# Patient Record
Sex: FEMALE | Race: WHITE | Employment: OTHER | URBAN - METROPOLITAN AREA
[De-identification: names, ages, dates, MRNs, and addresses within clinical notes are randomized per-mention and may not be internally consistent; named-entity substitution may affect disease eponyms.]

---

## 2017-05-01 ENCOUNTER — HOSPITAL ENCOUNTER (OUTPATIENT)
Dept: MAMMOGRAPHY | Age: 82
Discharge: HOME | End: 2017-05-01
Payer: MEDICARE

## 2017-05-01 DIAGNOSIS — Z13.9 SCREENING: ICD-10-CM

## 2017-05-01 PROCEDURE — 77063 BREAST TOMOSYNTHESIS BI: CPT

## 2017-06-23 ENCOUNTER — HOSPITAL ENCOUNTER (EMERGENCY)
Age: 82
Discharge: HOME | End: 2017-06-23
Payer: MEDICARE

## 2017-06-23 ENCOUNTER — APPOINTMENT (OUTPATIENT)
Dept: CT IMAGING | Age: 82
End: 2017-06-23
Payer: MEDICARE

## 2017-06-23 VITALS
HEIGHT: 70 IN | HEART RATE: 63 BPM | RESPIRATION RATE: 17 BRPM | WEIGHT: 191 LBS | OXYGEN SATURATION: 98 % | SYSTOLIC BLOOD PRESSURE: 198 MMHG | DIASTOLIC BLOOD PRESSURE: 72 MMHG | TEMPERATURE: 98.1 F | BODY MASS INDEX: 27.35 KG/M2

## 2017-06-23 DIAGNOSIS — I10 ESSENTIAL HYPERTENSION: Primary | ICD-10-CM

## 2017-06-23 LAB
ABSOLUTE EOS #: 0.1 K/UL (ref 0–0.4)
ABSOLUTE LYMPH #: 1.4 K/UL (ref 1–4.8)
ABSOLUTE MONO #: 0.5 K/UL (ref 0.2–0.8)
ANION GAP SERPL CALCULATED.3IONS-SCNC: 12 MMOL/L (ref 9–17)
BASOPHILS # BLD: 0 %
BASOPHILS ABSOLUTE: 0 K/UL (ref 0–0.2)
BUN BLDV-MCNC: 21 MG/DL (ref 8–23)
BUN/CREAT BLD: 25 (ref 9–20)
CALCIUM SERPL-MCNC: 9 MG/DL (ref 8.6–10.4)
CHLORIDE BLD-SCNC: 103 MMOL/L (ref 98–107)
CO2: 29 MMOL/L (ref 20–31)
CREAT SERPL-MCNC: 0.85 MG/DL (ref 0.5–0.9)
DIFFERENTIAL TYPE: ABNORMAL
EOSINOPHILS RELATIVE PERCENT: 3 %
GFR AFRICAN AMERICAN: >60 ML/MIN
GFR NON-AFRICAN AMERICAN: >60 ML/MIN
GFR SERPL CREATININE-BSD FRML MDRD: ABNORMAL ML/MIN/{1.73_M2}
GFR SERPL CREATININE-BSD FRML MDRD: ABNORMAL ML/MIN/{1.73_M2}
GLUCOSE BLD-MCNC: 131 MG/DL (ref 70–99)
HCT VFR BLD CALC: 36.7 % (ref 36–46)
HEMOGLOBIN: 12.2 G/DL (ref 12–16)
LYMPHOCYTES # BLD: 30 %
MCH RBC QN AUTO: 29.7 PG (ref 26–34)
MCHC RBC AUTO-ENTMCNC: 33.2 G/DL (ref 31–37)
MCV RBC AUTO: 89.4 FL (ref 80–100)
MONOCYTES # BLD: 11 %
MYOGLOBIN: 44 NG/ML (ref 25–58)
PDW BLD-RTO: 15.9 % (ref 11.5–14.5)
PLATELET # BLD: 194 K/UL (ref 130–400)
PLATELET ESTIMATE: ABNORMAL
PMV BLD AUTO: 7.7 FL (ref 6–12)
POTASSIUM SERPL-SCNC: 4.4 MMOL/L (ref 3.7–5.3)
RBC # BLD: 4.1 M/UL (ref 4–5.2)
RBC # BLD: ABNORMAL 10*6/UL
SEG NEUTROPHILS: 56 %
SEGMENTED NEUTROPHILS ABSOLUTE COUNT: 2.6 K/UL (ref 1.8–7.7)
SODIUM BLD-SCNC: 144 MMOL/L (ref 135–144)
TROPONIN INTERP: NORMAL
TROPONIN T: <0.03 NG/ML
WBC # BLD: 4.7 K/UL (ref 3.5–11)
WBC # BLD: ABNORMAL 10*3/UL

## 2017-06-23 PROCEDURE — 70450 CT HEAD/BRAIN W/O DYE: CPT

## 2017-06-23 PROCEDURE — 93005 ELECTROCARDIOGRAM TRACING: CPT

## 2017-06-23 PROCEDURE — 85025 COMPLETE CBC W/AUTO DIFF WBC: CPT

## 2017-06-23 PROCEDURE — 84484 ASSAY OF TROPONIN QUANT: CPT

## 2017-06-23 PROCEDURE — 99284 EMERGENCY DEPT VISIT MOD MDM: CPT

## 2017-06-23 PROCEDURE — 6370000000 HC RX 637 (ALT 250 FOR IP): Performed by: NURSE PRACTITIONER

## 2017-06-23 PROCEDURE — 83874 ASSAY OF MYOGLOBIN: CPT

## 2017-06-23 PROCEDURE — 80048 BASIC METABOLIC PNL TOTAL CA: CPT

## 2017-06-23 PROCEDURE — 6360000002 HC RX W HCPCS: Performed by: NURSE PRACTITIONER

## 2017-06-23 PROCEDURE — 96374 THER/PROPH/DIAG INJ IV PUSH: CPT

## 2017-06-23 RX ORDER — CLONIDINE HYDROCHLORIDE 0.1 MG/1
0.2 TABLET ORAL ONCE
Status: COMPLETED | OUTPATIENT
Start: 2017-06-23 | End: 2017-06-23

## 2017-06-23 RX ORDER — CYANOCOBALAMIN 1000 UG/ML
1000 INJECTION INTRAMUSCULAR; SUBCUTANEOUS
COMMUNITY

## 2017-06-23 RX ORDER — LISINOPRIL 10 MG/1
10 TABLET ORAL DAILY
Status: ON HOLD | COMMUNITY
End: 2020-09-10 | Stop reason: HOSPADM

## 2017-06-23 RX ORDER — HYDRALAZINE HYDROCHLORIDE 20 MG/ML
10 INJECTION INTRAMUSCULAR; INTRAVENOUS ONCE
Status: COMPLETED | OUTPATIENT
Start: 2017-06-23 | End: 2017-06-23

## 2017-06-23 RX ADMIN — HYDRALAZINE HYDROCHLORIDE 10 MG: 20 INJECTION INTRAMUSCULAR; INTRAVENOUS at 10:25

## 2017-06-23 RX ADMIN — CLONIDINE HYDROCHLORIDE 0.2 MG: 0.1 TABLET ORAL at 11:27

## 2017-06-23 ASSESSMENT — ENCOUNTER SYMPTOMS
RHINORRHEA: 0
SHORTNESS OF BREATH: 0
SINUS PRESSURE: 0
WHEEZING: 0
ABDOMINAL PAIN: 0
SORE THROAT: 0
VOMITING: 0
COLOR CHANGE: 0
DIARRHEA: 0
CONSTIPATION: 0
COUGH: 0
NAUSEA: 0

## 2017-06-27 LAB
EKG ATRIAL RATE: 56 BPM
EKG P AXIS: 20 DEGREES
EKG P-R INTERVAL: 134 MS
EKG Q-T INTERVAL: 448 MS
EKG QRS DURATION: 110 MS
EKG QTC CALCULATION (BAZETT): 432 MS
EKG R AXIS: -12 DEGREES
EKG T AXIS: 76 DEGREES
EKG VENTRICULAR RATE: 56 BPM

## 2018-04-12 ENCOUNTER — HOSPITAL ENCOUNTER (EMERGENCY)
Age: 83
Discharge: HOME | End: 2018-04-12
Attending: EMERGENCY MEDICINE
Payer: MEDICARE

## 2018-04-12 VITALS
RESPIRATION RATE: 22 BRPM | HEIGHT: 70 IN | TEMPERATURE: 98.2 F | OXYGEN SATURATION: 96 % | SYSTOLIC BLOOD PRESSURE: 162 MMHG | DIASTOLIC BLOOD PRESSURE: 97 MMHG | HEART RATE: 59 BPM | WEIGHT: 190 LBS | BODY MASS INDEX: 27.2 KG/M2

## 2018-04-12 DIAGNOSIS — N39.0 URINARY TRACT INFECTION WITHOUT HEMATURIA, SITE UNSPECIFIED: Primary | ICD-10-CM

## 2018-04-12 LAB
-: ABNORMAL
ABSOLUTE EOS #: 0.1 K/UL (ref 0–0.4)
ABSOLUTE IMMATURE GRANULOCYTE: ABNORMAL K/UL (ref 0–0.3)
ABSOLUTE LYMPH #: 1.3 K/UL (ref 1–4.8)
ABSOLUTE MONO #: 0.6 K/UL (ref 0.2–0.8)
AMORPHOUS: ABNORMAL
ANION GAP SERPL CALCULATED.3IONS-SCNC: 11 MMOL/L (ref 9–17)
APPEARANCE: NORMAL
BACTERIA: ABNORMAL
BASOPHILS # BLD: 0 % (ref 0–2)
BASOPHILS ABSOLUTE: 0 K/UL (ref 0–0.2)
BILIRUBIN URINE: NEGATIVE
BILIRUBIN, POC: NORMAL
BLOOD URINE, POC: NORMAL
BUN BLDV-MCNC: 16 MG/DL (ref 8–23)
BUN/CREAT BLD: 21 (ref 9–20)
CALCIUM SERPL-MCNC: 8.2 MG/DL (ref 8.6–10.4)
CASTS UA: ABNORMAL /LPF
CHLORIDE BLD-SCNC: 107 MMOL/L (ref 98–107)
CO2: 26 MMOL/L (ref 20–31)
COLOR, POC: YELLOW
COLOR: YELLOW
COMMENT UA: ABNORMAL
CREAT SERPL-MCNC: 0.75 MG/DL (ref 0.5–0.9)
CRYSTALS, UA: ABNORMAL /HPF
DIFFERENTIAL TYPE: ABNORMAL
EKG ATRIAL RATE: 57 BPM
EKG P AXIS: 64 DEGREES
EKG P-R INTERVAL: 172 MS
EKG Q-T INTERVAL: 480 MS
EKG QRS DURATION: 106 MS
EKG QTC CALCULATION (BAZETT): 467 MS
EKG R AXIS: -10 DEGREES
EKG T AXIS: 63 DEGREES
EKG VENTRICULAR RATE: 57 BPM
EOSINOPHILS RELATIVE PERCENT: 3 % (ref 1–4)
EPITHELIAL CELLS UA: ABNORMAL /HPF (ref 0–5)
GFR AFRICAN AMERICAN: >60 ML/MIN
GFR NON-AFRICAN AMERICAN: >60 ML/MIN
GFR SERPL CREATININE-BSD FRML MDRD: ABNORMAL ML/MIN/{1.73_M2}
GFR SERPL CREATININE-BSD FRML MDRD: ABNORMAL ML/MIN/{1.73_M2}
GLUCOSE BLD-MCNC: 103 MG/DL (ref 70–99)
GLUCOSE URINE, POC: NORMAL
GLUCOSE URINE: NEGATIVE
HCT VFR BLD CALC: 36.9 % (ref 36–46)
HEMOGLOBIN: 12.4 G/DL (ref 12–16)
IMMATURE GRANULOCYTES: ABNORMAL %
KETONES, POC: NORMAL
KETONES, URINE: NEGATIVE
LEUKOCYTE EST, POC: NORMAL
LEUKOCYTE ESTERASE, URINE: ABNORMAL
LYMPHOCYTES # BLD: 26 % (ref 24–44)
MCH RBC QN AUTO: 31.4 PG (ref 26–34)
MCHC RBC AUTO-ENTMCNC: 33.5 G/DL (ref 31–37)
MCV RBC AUTO: 93.6 FL (ref 80–100)
MONOCYTES # BLD: 12 % (ref 1–7)
MUCUS: ABNORMAL
MYOGLOBIN: 46 NG/ML (ref 25–58)
NITRITE, POC: POSITIVE
NITRITE, URINE: POSITIVE
NRBC AUTOMATED: ABNORMAL PER 100 WBC
OTHER OBSERVATIONS UA: ABNORMAL
PDW BLD-RTO: 15 % (ref 11.5–14.5)
PH UA: 6.5 (ref 5–8)
PH, POC: 6.5
PLATELET # BLD: 216 K/UL (ref 130–400)
PLATELET ESTIMATE: ABNORMAL
PMV BLD AUTO: ABNORMAL FL (ref 6–12)
POTASSIUM SERPL-SCNC: 4 MMOL/L (ref 3.7–5.3)
PROTEIN UA: NEGATIVE
PROTEIN, POC: NORMAL
RBC # BLD: 3.95 M/UL (ref 4–5.2)
RBC # BLD: ABNORMAL 10*6/UL
RBC UA: ABNORMAL /HPF (ref 0–2)
RENAL EPITHELIAL, UA: ABNORMAL /HPF
SEG NEUTROPHILS: 59 % (ref 36–66)
SEGMENTED NEUTROPHILS ABSOLUTE COUNT: 3 K/UL (ref 1.8–7.7)
SODIUM BLD-SCNC: 144 MMOL/L (ref 135–144)
SPECIFIC GRAVITY UA: 1.02 (ref 1–1.03)
SPECIFIC GRAVITY, POC: 1.02
TRICHOMONAS: ABNORMAL
TROPONIN INTERP: NORMAL
TROPONIN T: <0.03 NG/ML
TSH SERPL DL<=0.05 MIU/L-ACNC: 3.42 MIU/L (ref 0.3–5)
TURBIDITY: ABNORMAL
URINE HGB: ABNORMAL
UROBILINOGEN, POC: 0.2
UROBILINOGEN, URINE: NORMAL
WBC # BLD: 5 K/UL (ref 3.5–11)
WBC # BLD: ABNORMAL 10*3/UL
WBC UA: ABNORMAL /HPF (ref 0–5)
YEAST: ABNORMAL

## 2018-04-12 PROCEDURE — 87088 URINE BACTERIA CULTURE: CPT

## 2018-04-12 PROCEDURE — 84443 ASSAY THYROID STIM HORMONE: CPT

## 2018-04-12 PROCEDURE — 80048 BASIC METABOLIC PNL TOTAL CA: CPT

## 2018-04-12 PROCEDURE — 87086 URINE CULTURE/COLONY COUNT: CPT

## 2018-04-12 PROCEDURE — 93005 ELECTROCARDIOGRAM TRACING: CPT

## 2018-04-12 PROCEDURE — 84484 ASSAY OF TROPONIN QUANT: CPT

## 2018-04-12 PROCEDURE — 85025 COMPLETE CBC W/AUTO DIFF WBC: CPT

## 2018-04-12 PROCEDURE — 99283 EMERGENCY DEPT VISIT LOW MDM: CPT

## 2018-04-12 PROCEDURE — 96374 THER/PROPH/DIAG INJ IV PUSH: CPT

## 2018-04-12 PROCEDURE — 2500000003 HC RX 250 WO HCPCS: Performed by: EMERGENCY MEDICINE

## 2018-04-12 PROCEDURE — 87186 SC STD MICRODIL/AGAR DIL: CPT

## 2018-04-12 PROCEDURE — 83874 ASSAY OF MYOGLOBIN: CPT

## 2018-04-12 PROCEDURE — 81001 URINALYSIS AUTO W/SCOPE: CPT

## 2018-04-12 PROCEDURE — 2580000003 HC RX 258: Performed by: EMERGENCY MEDICINE

## 2018-04-12 PROCEDURE — 6360000002 HC RX W HCPCS: Performed by: EMERGENCY MEDICINE

## 2018-04-12 RX ORDER — 0.9 % SODIUM CHLORIDE 0.9 %
500 INTRAVENOUS SOLUTION INTRAVENOUS ONCE
Status: COMPLETED | OUTPATIENT
Start: 2018-04-12 | End: 2018-04-12

## 2018-04-12 RX ORDER — CEPHALEXIN 500 MG/1
500 CAPSULE ORAL 3 TIMES DAILY
Qty: 30 CAPSULE | Refills: 0 | Status: SHIPPED | OUTPATIENT
Start: 2018-04-12 | End: 2018-06-29

## 2018-04-12 RX ADMIN — CEFTRIAXONE SODIUM 1 G: 10 INJECTION, POWDER, FOR SOLUTION INTRAVENOUS at 10:12

## 2018-04-12 RX ADMIN — SODIUM CHLORIDE 500 ML: 9 INJECTION, SOLUTION INTRAVENOUS at 10:11

## 2018-04-14 LAB
CULTURE: ABNORMAL
CULTURE: ABNORMAL
Lab: ABNORMAL
ORGANISM: ABNORMAL
SPECIMEN DESCRIPTION: ABNORMAL
SPECIMEN DESCRIPTION: ABNORMAL
STATUS: ABNORMAL

## 2018-06-29 ENCOUNTER — APPOINTMENT (OUTPATIENT)
Dept: GENERAL RADIOLOGY | Age: 83
End: 2018-06-29
Payer: MEDICARE

## 2018-06-29 ENCOUNTER — HOSPITAL ENCOUNTER (EMERGENCY)
Age: 83
Discharge: HOME | End: 2018-06-29
Attending: EMERGENCY MEDICINE
Payer: MEDICARE

## 2018-06-29 VITALS
HEIGHT: 70 IN | RESPIRATION RATE: 16 BRPM | OXYGEN SATURATION: 96 % | WEIGHT: 190 LBS | BODY MASS INDEX: 27.2 KG/M2 | DIASTOLIC BLOOD PRESSURE: 63 MMHG | HEART RATE: 51 BPM | TEMPERATURE: 97.7 F | SYSTOLIC BLOOD PRESSURE: 140 MMHG

## 2018-06-29 DIAGNOSIS — M54.41 BILATERAL LOW BACK PAIN WITH RIGHT-SIDED SCIATICA, UNSPECIFIED CHRONICITY: Primary | ICD-10-CM

## 2018-06-29 DIAGNOSIS — K59.00 CONSTIPATION, UNSPECIFIED CONSTIPATION TYPE: ICD-10-CM

## 2018-06-29 PROCEDURE — 72100 X-RAY EXAM L-S SPINE 2/3 VWS: CPT

## 2018-06-29 PROCEDURE — 6370000000 HC RX 637 (ALT 250 FOR IP): Performed by: NURSE PRACTITIONER

## 2018-06-29 PROCEDURE — 73502 X-RAY EXAM HIP UNI 2-3 VIEWS: CPT

## 2018-06-29 PROCEDURE — 99283 EMERGENCY DEPT VISIT LOW MDM: CPT

## 2018-06-29 RX ORDER — HYDROCODONE BITARTRATE AND ACETAMINOPHEN 5; 325 MG/1; MG/1
1 TABLET ORAL EVERY 6 HOURS PRN
Qty: 20 TABLET | Refills: 0 | Status: SHIPPED | OUTPATIENT
Start: 2018-06-29 | End: 2018-07-06

## 2018-06-29 RX ORDER — HYDROCODONE BITARTRATE AND ACETAMINOPHEN 5; 325 MG/1; MG/1
1 TABLET ORAL ONCE
Status: COMPLETED | OUTPATIENT
Start: 2018-06-29 | End: 2018-06-29

## 2018-06-29 RX ORDER — DONEPEZIL HYDROCHLORIDE 5 MG/1
5 TABLET, FILM COATED ORAL NIGHTLY
COMMUNITY
Start: 2018-01-30

## 2018-06-29 RX ORDER — OMEPRAZOLE 10 MG/1
20 CAPSULE, DELAYED RELEASE ORAL
COMMUNITY
Start: 2018-05-01 | End: 2018-06-30

## 2018-06-29 RX ORDER — LORAZEPAM 0.5 MG/1
0.5 TABLET ORAL EVERY 6 HOURS PRN
Status: ON HOLD | COMMUNITY
End: 2021-02-22 | Stop reason: ALTCHOICE

## 2018-06-29 RX ADMIN — HYDROCODONE BITARTRATE AND ACETAMINOPHEN 1 TABLET: 5; 325 TABLET ORAL at 09:39

## 2018-06-29 ASSESSMENT — PAIN DESCRIPTION - PAIN TYPE: TYPE: ACUTE PAIN

## 2018-06-29 ASSESSMENT — ENCOUNTER SYMPTOMS
COUGH: 0
ABDOMINAL PAIN: 0
SHORTNESS OF BREATH: 0
COLOR CHANGE: 0
BACK PAIN: 1

## 2018-06-29 ASSESSMENT — PAIN SCALES - GENERAL
PAINLEVEL_OUTOF10: 9
PAINLEVEL_OUTOF10: 7

## 2018-06-29 ASSESSMENT — PAIN DESCRIPTION - ORIENTATION: ORIENTATION: RIGHT;UPPER;LOWER

## 2018-06-29 ASSESSMENT — PAIN DESCRIPTION - LOCATION: LOCATION: LEG

## 2018-06-29 NOTE — ED PROVIDER NOTES
pain.   Gastrointestinal: Negative for abdominal pain. Genitourinary: Negative for dysuria, flank pain, frequency, hematuria and urgency. Musculoskeletal: Positive for arthralgias, back pain and myalgias. Negative for neck pain. Skin: Negative for color change, rash and wound. Neurological: Negative for dizziness, weakness, light-headedness, numbness and headaches. Except as noted above the remainder of the review of systems was reviewed and negative. PHYSICAL EXAM    (up to 7 for level 4, 8 or more for level 5)     ED Triage Vitals   BP Temp Temp src Pulse Resp SpO2 Height Weight   -- -- -- -- -- -- -- --     Physical Exam   Constitutional: She is oriented to person, place, and time. She appears well-developed and well-nourished. No distress. Eyes: Conjunctivae are normal.   Cardiovascular: Normal rate, regular rhythm and normal heart sounds. Pulmonary/Chest: Effort normal and breath sounds normal. No respiratory distress. She has no wheezes. She has no rales. Abdominal: Soft. She exhibits no distension. There is no tenderness. Musculoskeletal: She exhibits no edema. Right hip: She exhibits tenderness. She exhibits no swelling and no deformity. Thoracic back: She exhibits no tenderness, no bony tenderness and no pain. Lumbar back: She exhibits tenderness and pain. She exhibits no swelling and no deformity. Back:    Neurological: She is alert and oriented to person, place, and time. Skin: Skin is warm and dry. No rash noted. She is not diaphoretic. Psychiatric: She has a normal mood and affect. Her behavior is normal.   Vitals reviewed.       DIAGNOSTIC RESULTS     RADIOLOGY:   Non-plain film images such as CT, Ultrasound and MRI are read by the radiologist. Plain radiographic images are visualized and preliminarily interpreted by the emergency physician with the below findings:    Interpretation per the Radiologist below, if available at the time of this the right greater trochanter. There is up to 5 mm of periprosthetic lucency surrounding the acetabular component of the right hip arthroplasty hardware. Right femoral head component projects centrally over the right acetabular component. Lumbar spine: 1. Moderate diffuse degenerative changes throughout the lumbar spine. No clear evidence for acute fracture within the lumbar spine. Mild levoconvex curvature of the lumbar spine. Diffuse osteopenia. 2. Moderate stool burden. Right hip: 1. Up to 5 mm periprosthetic lucency surrounding the acetabular component of the right hip arthroplasty hardware. 2. Diffuse osteopenia. No acute osseous abnormality identified. 3. Moderate stool burden. 4. Mild left hip osteoarthrosis. 5. Top the calcification adjacent to the right greater trochanter. Xr Hip 2-3 Vw W Pelvis Right    Result Date: 6/29/2018  EXAMINATION: SINGLE XRAY VIEW OF THE PELVIS AND 2 XRAY VEWS RIGHT HIP; 3 XRAY VIEWS OF THE LUMBAR SPINE 6/29/2018 9:36 am COMPARISON: AP pelvis from 04/08/2011 ; lumbar spine plain radiographs from 11/06/2009 HISTORY: ORDERING SYSTEM PROVIDED HISTORY: pain TECHNOLOGIST PROVIDED HISTORY: Reason for exam:->pain Ordering Physician Provided Reason for Exam: Leg pain since last night. Pain with walking. Acuity: Acute Type of Exam: Initial 55-year-old female with acute leg pain since last night ; pain with ambulation FINDINGS: Lumbar spine: Lumbar spine is imaged from superior aspect of T10 to the lower coccyx on the lateral views. Gross preservation of the vertebral body heights. Mild to moderate multilevel intervertebral disc space narrowing and mild multilevel hypertrophic osteophyte spur formation. Diffuse osteopenia. Severe disc space narrowing at L4-L5-L5-S1. Moderate facet arthrosis at the lower lumbar/lumbosacral junction. Alignment well maintained. Atherosclerotic calcification of the aorta. Moderate stool burden.   Surgical clips project over the right upper quadrant as well as the left upper quadrant. Indeterminate hyperdense structure projects over the right mid abdomen. Psoas shadows symmetric in appearance. Bilateral SI joints appear patent. Visualized sacral arcuate lines appear intact. Mild levoconvex curvature of the lumbar spine. Right hip: Indeterminate hyperdense structure projects over the inferior left hemipelvis. Pelvic phleboliths. Moderate stool burden. Diffuse osteopenia. Moderate degenerative changes in the lower lumbar/lumbosacral spine. Bilateral SI joints appear patent. Visualized sacral arcuate lines appear intact. Mild left hip osteoarthrosis. Total right hip arthroplasty hardware partially visualized. Iliac wings symmetric in appearance. Mild stool in the rectal vault. Heterotopic ossification adjacent to the right greater trochanter. There is up to 5 mm of periprosthetic lucency surrounding the acetabular component of the right hip arthroplasty hardware. Right femoral head component projects centrally over the right acetabular component. Lumbar spine: 1. Moderate diffuse degenerative changes throughout the lumbar spine. No clear evidence for acute fracture within the lumbar spine. Mild levoconvex curvature of the lumbar spine. Diffuse osteopenia. 2. Moderate stool burden. Right hip: 1. Up to 5 mm periprosthetic lucency surrounding the acetabular component of the right hip arthroplasty hardware. 2. Diffuse osteopenia. No acute osseous abnormality identified. 3. Moderate stool burden. 4. Mild left hip osteoarthrosis. 5. Top the calcification adjacent to the right greater trochanter.      EMERGENCY DEPARTMENT COURSE and DIFFERENTIAL DIAGNOSIS/MDM:   Vitals:    Vitals:    06/29/18 0850   BP: (!) 140/63   Pulse: 51   Resp: 16   Temp: 97.7 °F (36.5 °C)   TempSrc: Oral   SpO2: 96%   Weight: 190 lb (86.2 kg)   Height: 5' 10\" (1.778 m)       MEDICATIONS GIVEN IN THE ED:  Medications   HYDROcodone-acetaminophen (NORCO) 5-325 MG per

## 2018-07-10 ENCOUNTER — HOSPITAL ENCOUNTER (OUTPATIENT)
Dept: MAMMOGRAPHY | Age: 83
Discharge: HOME | End: 2018-07-12
Payer: MEDICARE

## 2018-07-10 DIAGNOSIS — Z12.31 ENCOUNTER FOR SCREENING MAMMOGRAM FOR BREAST CANCER: ICD-10-CM

## 2018-07-10 PROCEDURE — 77063 BREAST TOMOSYNTHESIS BI: CPT

## 2018-07-13 ENCOUNTER — HOSPITAL ENCOUNTER (OUTPATIENT)
Dept: VASCULAR LAB | Age: 83
Discharge: HOME | End: 2018-07-13
Payer: MEDICARE

## 2018-07-13 PROCEDURE — 93924 LWR XTR VASC STDY BILAT: CPT

## 2020-09-04 ENCOUNTER — HOSPITAL ENCOUNTER (EMERGENCY)
Age: 85
Discharge: ANOTHER HOSPITAL | End: 2020-09-05
Attending: EMERGENCY MEDICINE
Payer: MEDICARE

## 2020-09-04 ENCOUNTER — APPOINTMENT (OUTPATIENT)
Dept: GENERAL RADIOLOGY | Age: 85
End: 2020-09-04
Payer: MEDICARE

## 2020-09-04 VITALS
WEIGHT: 150 LBS | DIASTOLIC BLOOD PRESSURE: 72 MMHG | TEMPERATURE: 99.4 F | RESPIRATION RATE: 18 BRPM | HEART RATE: 69 BPM | OXYGEN SATURATION: 95 % | SYSTOLIC BLOOD PRESSURE: 127 MMHG | BODY MASS INDEX: 22.22 KG/M2 | HEIGHT: 69 IN

## 2020-09-04 PROBLEM — D69.6 THROMBOCYTOPENIA (HCC): Status: ACTIVE | Noted: 2020-09-04

## 2020-09-04 PROBLEM — U07.1 COVID-19: Status: ACTIVE | Noted: 2020-09-04

## 2020-09-04 PROBLEM — N17.9 AKI (ACUTE KIDNEY INJURY) (HCC): Status: ACTIVE | Noted: 2020-09-04

## 2020-09-04 LAB
ABSOLUTE EOS #: 0.03 K/UL (ref 0–0.4)
ABSOLUTE IMMATURE GRANULOCYTE: 0 K/UL (ref 0–0.3)
ABSOLUTE LYMPH #: 0.81 K/UL (ref 1–4.8)
ABSOLUTE MONO #: 0.42 K/UL (ref 0.2–0.8)
ALBUMIN SERPL-MCNC: 4 G/DL (ref 3.5–5.2)
ALBUMIN/GLOBULIN RATIO: ABNORMAL (ref 1–2.5)
ALP BLD-CCNC: 49 U/L (ref 35–104)
ALT SERPL-CCNC: 30 U/L (ref 5–33)
ANION GAP SERPL CALCULATED.3IONS-SCNC: 9 MMOL/L (ref 9–17)
AST SERPL-CCNC: 36 U/L
BASOPHILS # BLD: 0 %
BASOPHILS ABSOLUTE: 0 K/UL (ref 0–0.2)
BILIRUB SERPL-MCNC: 0.38 MG/DL (ref 0.3–1.2)
BILIRUBIN URINE: NEGATIVE
BNP INTERPRETATION: ABNORMAL
BUN BLDV-MCNC: 31 MG/DL (ref 8–23)
BUN/CREAT BLD: 29 (ref 9–20)
C-REACTIVE PROTEIN: 3.1 MG/L (ref 0–5)
CALCIUM SERPL-MCNC: 8.9 MG/DL (ref 8.6–10.4)
CHLORIDE BLD-SCNC: 104 MMOL/L (ref 98–107)
CO2: 27 MMOL/L (ref 20–31)
COLOR: YELLOW
COMMENT UA: NORMAL
CREAT SERPL-MCNC: 1.08 MG/DL (ref 0.5–0.9)
DIFFERENTIAL TYPE: ABNORMAL
EOSINOPHILS RELATIVE PERCENT: 1 % (ref 1–4)
FERRITIN: 283 UG/L (ref 13–150)
GFR AFRICAN AMERICAN: 58 ML/MIN
GFR NON-AFRICAN AMERICAN: 48 ML/MIN
GFR SERPL CREATININE-BSD FRML MDRD: ABNORMAL ML/MIN/{1.73_M2}
GFR SERPL CREATININE-BSD FRML MDRD: ABNORMAL ML/MIN/{1.73_M2}
GLUCOSE BLD-MCNC: 135 MG/DL (ref 70–99)
GLUCOSE URINE: NEGATIVE
HCT VFR BLD CALC: 34.3 % (ref 36.3–47.1)
HEMOGLOBIN: 10.9 G/DL (ref 11.9–15.1)
IMMATURE GRANULOCYTES: 0 %
KETONES, URINE: NEGATIVE
LACTATE DEHYDROGENASE: 185 U/L (ref 135–214)
LACTIC ACID, SEPSIS WHOLE BLOOD: NORMAL MMOL/L (ref 0.5–1.9)
LACTIC ACID, SEPSIS WHOLE BLOOD: NORMAL MMOL/L (ref 0.5–1.9)
LACTIC ACID, SEPSIS: 0.8 MMOL/L (ref 0.5–1.9)
LACTIC ACID, SEPSIS: 1.2 MMOL/L (ref 0.5–1.9)
LEUKOCYTE ESTERASE, URINE: NEGATIVE
LYMPHOCYTES # BLD: 27 % (ref 24–44)
MCH RBC QN AUTO: 31 PG (ref 25.2–33.5)
MCHC RBC AUTO-ENTMCNC: 31.8 G/DL (ref 28.4–34.8)
MCV RBC AUTO: 97.4 FL (ref 82.6–102.9)
MONOCYTES # BLD: 14 % (ref 1–7)
MYOGLOBIN: 52 NG/ML (ref 25–58)
MYOGLOBIN: 62 NG/ML (ref 25–58)
NITRITE, URINE: NEGATIVE
NRBC AUTOMATED: 0 PER 100 WBC
PDW BLD-RTO: 13.4 % (ref 11.8–14.4)
PH UA: 5.5 (ref 5–8)
PLATELET # BLD: 129 K/UL (ref 138–453)
PLATELET ESTIMATE: ABNORMAL
PMV BLD AUTO: 9.9 FL (ref 8.1–13.5)
POTASSIUM SERPL-SCNC: 4.5 MMOL/L (ref 3.7–5.3)
PRO-BNP: 2196 PG/ML
PROCALCITONIN: 0.07 NG/ML
PROTEIN UA: NEGATIVE
RBC # BLD: 3.52 M/UL (ref 3.95–5.11)
RBC # BLD: ABNORMAL 10*6/UL
SARS-COV-2, PCR: ABNORMAL
SARS-COV-2, RAPID: DETECTED
SARS-COV-2: ABNORMAL
SEG NEUTROPHILS: 58 % (ref 36–66)
SEGMENTED NEUTROPHILS ABSOLUTE COUNT: 1.74 K/UL (ref 1.8–7.7)
SODIUM BLD-SCNC: 140 MMOL/L (ref 135–144)
SOURCE: ABNORMAL
SPECIFIC GRAVITY UA: 1.02 (ref 1–1.03)
TOTAL PROTEIN: 6.1 G/DL (ref 6.4–8.3)
TROPONIN INTERP: ABNORMAL
TROPONIN INTERP: ABNORMAL
TROPONIN T: ABNORMAL NG/ML
TROPONIN T: ABNORMAL NG/ML
TROPONIN, HIGH SENSITIVITY: 32 NG/L (ref 0–14)
TROPONIN, HIGH SENSITIVITY: 33 NG/L (ref 0–14)
TURBIDITY: CLEAR
URINE HGB: NEGATIVE
UROBILINOGEN, URINE: NORMAL
WBC # BLD: 3 K/UL (ref 3.5–11.3)
WBC # BLD: ABNORMAL 10*3/UL

## 2020-09-04 PROCEDURE — 84145 PROCALCITONIN (PCT): CPT

## 2020-09-04 PROCEDURE — 71045 X-RAY EXAM CHEST 1 VIEW: CPT

## 2020-09-04 PROCEDURE — 99284 EMERGENCY DEPT VISIT MOD MDM: CPT

## 2020-09-04 PROCEDURE — 86140 C-REACTIVE PROTEIN: CPT

## 2020-09-04 PROCEDURE — 80053 COMPREHEN METABOLIC PANEL: CPT

## 2020-09-04 PROCEDURE — 93005 ELECTROCARDIOGRAM TRACING: CPT | Performed by: NURSE PRACTITIONER

## 2020-09-04 PROCEDURE — 87040 BLOOD CULTURE FOR BACTERIA: CPT

## 2020-09-04 PROCEDURE — 2580000003 HC RX 258: Performed by: NURSE PRACTITIONER

## 2020-09-04 PROCEDURE — 83880 ASSAY OF NATRIURETIC PEPTIDE: CPT

## 2020-09-04 PROCEDURE — U0002 COVID-19 LAB TEST NON-CDC: HCPCS

## 2020-09-04 PROCEDURE — 81003 URINALYSIS AUTO W/O SCOPE: CPT

## 2020-09-04 PROCEDURE — 84484 ASSAY OF TROPONIN QUANT: CPT

## 2020-09-04 PROCEDURE — 6370000000 HC RX 637 (ALT 250 FOR IP): Performed by: NURSE PRACTITIONER

## 2020-09-04 PROCEDURE — 82728 ASSAY OF FERRITIN: CPT

## 2020-09-04 PROCEDURE — 83605 ASSAY OF LACTIC ACID: CPT

## 2020-09-04 PROCEDURE — 83874 ASSAY OF MYOGLOBIN: CPT

## 2020-09-04 PROCEDURE — 83615 LACTATE (LD) (LDH) ENZYME: CPT

## 2020-09-04 PROCEDURE — 87086 URINE CULTURE/COLONY COUNT: CPT

## 2020-09-04 PROCEDURE — 85025 COMPLETE CBC W/AUTO DIFF WBC: CPT

## 2020-09-04 RX ORDER — SODIUM CHLORIDE 9 MG/ML
INJECTION, SOLUTION INTRAVENOUS CONTINUOUS
Status: DISCONTINUED | OUTPATIENT
Start: 2020-09-04 | End: 2020-09-05 | Stop reason: HOSPADM

## 2020-09-04 RX ORDER — ACETAMINOPHEN 500 MG
1000 TABLET ORAL ONCE
Status: COMPLETED | OUTPATIENT
Start: 2020-09-04 | End: 2020-09-04

## 2020-09-04 RX ADMIN — ACETAMINOPHEN 1000 MG: 500 TABLET ORAL at 18:51

## 2020-09-04 RX ADMIN — SODIUM CHLORIDE: 9 INJECTION, SOLUTION INTRAVENOUS at 18:53

## 2020-09-04 ASSESSMENT — ENCOUNTER SYMPTOMS
SHORTNESS OF BREATH: 0
WHEEZING: 0
VOMITING: 0
NAUSEA: 0
DIARRHEA: 0
COUGH: 0
ABDOMINAL PAIN: 0

## 2020-09-04 ASSESSMENT — PAIN SCALES - GENERAL
PAINLEVEL_OUTOF10: 0
PAINLEVEL_OUTOF10: 0

## 2020-09-04 ASSESSMENT — VISUAL ACUITY: OU: 1

## 2020-09-04 NOTE — ED PROVIDER NOTES
EMERGENCY DEPARTMENT ENCOUNTER   ATTENDING ATTESTATION     Pt Name: Siva Whitaker  MRN: [id number]  [place] 10/28/1932  Date of evaluation: 9/4/20   Siva Whitaker is a 80 y.o. female with CC: Fatigue (possible exposure to daughter with Covid symptoms) and Fever (did not take tylenol or motrin)    MDM:   Patient is a 26-year-old female who presented to the emergency department secondary to fever fatigue possible exposure to granddaughter with cold symptoms. Labs obtained chest x-ray. Patient febrile she received Tylenol. Patient will likely undergo alcohol the test and will be admitted. CRITICAL CARE:       EKG: All EKG's are interpreted by the Emergency Department Physician who either signs or Co-signs this chart in the absence of a cardiologist.      RADIOLOGY:All plain film, CT, MRI, and formal ultrasound images (except ED bedside ultrasound) are read by the radiologist, see reports below, unless otherwise noted in MDM or here. XR CHEST PORTABLE   Final Result   No acute pulmonary disease. Calcific atherosclerotic disease aorta. LABS: All lab results were reviewed by myself, and all abnormals are listed below.   Labs Reviewed   COMPREHENSIVE METABOLIC PANEL W/ REFLEX TO MG FOR LOW K - Abnormal; Notable for the following components:       Result Value    Glucose 135 (*)     BUN 31 (*)     CREATININE 1.08 (*)     Bun/Cre Ratio 29 (*)     AST 36 (*)     Total Protein 6.1 (*)     GFR Non- 48 (*)     GFR  58 (*)     All other components within normal limits   CBC WITH AUTO DIFFERENTIAL - Abnormal; Notable for the following components:    WBC 3.0 (*)     RBC 3.52 (*)     Hemoglobin 10.9 (*)     Hematocrit 34.3 (*)     Platelets 177 (*)     Monocytes 14 (*)     Segs Absolute 1.74 (*)     Absolute Lymph # 0.81 (*)     All other components within normal limits   TROP/MYOGLOBIN - Abnormal; Notable for the following components:    Troponin, High Sensitivity 33 (*)     Myoglobin 62 (*)     All other components within normal limits   FERRITIN - Abnormal; Notable for the following components:    Ferritin 283 (*)     All other components within normal limits   BRAIN NATRIURETIC PEPTIDE - Abnormal; Notable for the following components:    Pro-BNP 2,196 (*)     All other components within normal limits   CULTURE, BLOOD 1   CULTURE, BLOOD 1   CULTURE, URINE   LACTATE DEHYDROGENASE   LACTATE, SEPSIS   TROP/MYOGLOBIN   PROCALCITONIN   C-REACTIVE PROTEIN   LACTATE, SEPSIS   URINALYSIS     CONSULTS:  None  FINAL IMPRESSION    No diagnosis found. 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  fistulogram negative     SURGICAL HISTORY       Past Surgical History:   Procedure Laterality Date    BLEPHAROPLASTY      BREAST BIOPSY      CHOLECYSTECTOMY      HYSTERECTOMY      TAVO due to fibroids    JOINT REPLACEMENT  2011    KIDNEY CYST REMOVAL      OTHER SURGICAL HISTORY      thyroid nodule removed    VULVAR/PERINEAL BIOPSY       CURRENT MEDICATIONS       Previous Medications    CHOLECALCIFEROL (VITAMIN D-3 PO)      Take 2,000 Units by mouth daily     CYANOCOBALAMIN 1000 MCG/ML INJECTION    Inject 1,000 mcg into the muscle every 30 days    DENOSUMAB (PROLIA) 60 MG/ML SOLN SC INJECTION    Inject 60 mg into the skin once    DONEPEZIL (ARICEPT) 5 MG TABLET Take by mouth    DULOXETINE (CYMBALTA) 60 MG CAPSULE    Take 60 mg by mouth daily    LISINOPRIL (PRINIVIL;ZESTRIL) 10 MG TABLET    Take 10 mg by mouth daily    LORAZEPAM (ATIVAN) 0.5 MG TABLET    Take 0.5 mg by mouth every 6 hours as needed for Anxiety. .    MELOXICAM (MOBIC) 7.5 MG TABLET    Take 1 tablet by mouth daily    METOPROLOL (TOPROL-XL) 50 MG XL TABLET    Take 50 mg by mouth daily. MULTIPLE VITAMINS-MINERALS (CENTRUM SILVER ULTRA WOMENS) TABS    Take 1 tablet by mouth daily    OMEPRAZOLE (PRILOSEC) 10 MG DELAYED RELEASE CAPSULE    Take 20 mg by mouth    POLYETHYLENE GLYCOL 3350 (MIRALAX PO)    Take  by mouth as needed. TRAZODONE (DESYREL) 50 MG TABLET    Take 50 mg by mouth nightly     ALLERGIES     has No Known Allergies. FAMILY HISTORY     She indicated that the status of her mother is unknown. She indicated that the status of her father is unknown. She indicated that her brother is . SOCIAL HISTORY       Social History     Tobacco Use    Smoking status: Former Smoker     Last attempt to quit: 10/16/1973     Years since quittin.9    Smokeless tobacco: Never Used    Tobacco comment: stopped 40 years ago   Substance Use Topics    Alcohol use: Yes     Alcohol/week: 0.0 standard drinks    Drug use: No       I personally evaluated and examined the patient in conjunction with the APC and agree with the assessment, treatment plan, and disposition of the patient as recorded by the APC.    Renita Alba MD  Attending Emergency Physician          Renita Alba MD  80/58/56 [id number]

## 2020-09-04 NOTE — ED NOTES
Bed: 11  Expected date:   Expected time:   Means of arrival:   Comments:  dayanara Gonzalez, [address] [facility]  09/04/20 5968

## 2020-09-04 NOTE — ED PROVIDER NOTES
[address] [place] [address] ED  EMERGENCY DEPARTMENT ENCOUNTER      Pt Name: Gloria Gifford  MRN: [id number]  [place] 10/28/1932  Date of evaluation: 9/4/2020  Provider: YAMIL Green CNP    CHIEF COMPLAINT       Chief Complaint   Patient presents with    Fatigue     possible exposure to daughter with Covid symptoms    Fever     did not take tylenol or motrin         HISTORY OFPRESENT ILLNESS  (Location/Symptom, Timing/Onset, Context/Setting, Quality, Duration, Modifying Factors, Severity.)   Gloria Gifford is a 80 y.o. female who presents to the emergency department by EMS for evaluation of fatigue, increased generalized weakness and fever today. Patient states her her daughter was concerned that she and her daughter may have COVID. Patient denies headache, cough, shortness of breath, abdominal pain, chest pain, vomiting or diarrhea. Patient lives at home alone but family helps take care of her. She does ambulate with a walker. Nursing Notes were reviewed. 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 Carranza Quiet)    Cataracts, bilateral     Colon polyp 1997    Diverticulosis     Fibroid     Forgetfulness 6/27/13    (observed by neighbor who came with pt at a visit prior to today)    Gait disturbance     Gall stones     Headache(784.0)     Hearing loss     HTN (hypertension)     Insomnia     Lichen sclerosus     Osteoporosis     Other activity(E029.9)     introital fissure    Pap smear for cervical cancer screening     no further paps    Pneumonia 2006    Vaginal atrophy     Vaginal discharge     enteric jose r.  fistulogram negative         SURGICAL HISTORY       Past Surgical History:   Procedure Laterality Date    BLEPHAROPLASTY      BREAST BIOPSY      CHOLECYSTECTOMY      HYSTERECTOMY      TAVO due to fibroids    JOINT REPLACEMENT  2011    KIDNEY CYST REMOVAL      OTHER SURGICAL HISTORY thyroid nodule removed    VULVAR/PERINEAL BIOPSY           CURRENT MEDICATIONS     Previous Medications    CHOLECALCIFEROL (VITAMIN D-3 PO)      Take 2,000 Units by mouth daily     CYANOCOBALAMIN 1000 MCG/ML INJECTION    Inject 1,000 mcg into the muscle every 30 days    DENOSUMAB (PROLIA) 60 MG/ML SOLN SC INJECTION    Inject 60 mg into the skin once    DONEPEZIL (ARICEPT) 5 MG TABLET    Take by mouth    DULOXETINE (CYMBALTA) 60 MG CAPSULE    Take 60 mg by mouth daily    LISINOPRIL (PRINIVIL;ZESTRIL) 10 MG TABLET    Take 10 mg by mouth daily    LORAZEPAM (ATIVAN) 0.5 MG TABLET    Take 0.5 mg by mouth every 6 hours as needed for Anxiety. .    MELOXICAM (MOBIC) 7.5 MG TABLET    Take 1 tablet by mouth daily    METOPROLOL (TOPROL-XL) 50 MG XL TABLET    Take 50 mg by mouth daily. MULTIPLE VITAMINS-MINERALS (CENTRUM SILVER ULTRA WOMENS) TABS    Take 1 tablet by mouth daily    OMEPRAZOLE (PRILOSEC) 10 MG DELAYED RELEASE CAPSULE    Take 20 mg by mouth    POLYETHYLENE GLYCOL 3350 (MIRALAX PO)    Take  by mouth as needed. TRAZODONE (DESYREL) 50 MG TABLET    Take 50 mg by mouth nightly       ALLERGIES     Patient has no known allergies. FAMILY HISTORY       Family History   Problem Relation Age of Onset    Colon Cancer Brother             Diabetes Father     Bipolar Disorder Mother     Osteoporosis Mother     Migraines Mother           SOCIAL HISTORY       Social History     Socioeconomic History    Marital status:       Spouse name: None    Number of children: None    Years of education: None    Highest education level: None   Occupational History    None   Social Needs    Financial resource strain: None    Food insecurity     Worry: None     Inability: None    Transportation needs     Medical: None     Non-medical: None   Tobacco Use    Smoking status: Former Smoker     Last attempt to quit: 10/16/1973     Years since quittin.9    Smokeless tobacco: Never Used    Tobacco comment: stopped 40 years ago   Substance and Sexual Activity    Alcohol use: Yes     Alcohol/week: 0.0 standard drinks    Drug use: No    Sexual activity: Never   Lifestyle    Physical activity     Days per week: None     Minutes per session: None    Stress: None   Relationships    Social connections     Talks on phone: None     Gets together: None     Attends Bahai service: None     Active member of club or organization: None     Attends meetings of clubs or organizations: None     Relationship status: None    Intimate partner violence     Fear of current or ex partner: None     Emotionally abused: None     Physically abused: None     Forced sexual activity: None   Other Topics Concern    None   Social History Narrative    None         REVIEW OF SYSTEMS    (2-9 systems for level 4, 10 or more for level 5)     Review of Systems   Constitutional: Positive for activity change and fatigue. Respiratory: Negative for cough, shortness of breath and wheezing. Cardiovascular: Negative for chest pain. Gastrointestinal: Negative for abdominal pain, diarrhea, nausea and vomiting. Neurological: Negative for weakness and headaches. All other systems reviewed and are negative. Except as noted above the remainder of the review of systems was reviewed and negative. PHYSICAL EXAM    (up to 7 for level 4, 8 or more for level 5)     ED Triage Vitals [09/04/20 1813]   BP Temp Temp Source Pulse Resp SpO2 Height Weight   136/64 100 °F (37.8 °C) Oral 67 16 94 % 5' 9\" (1.753 m) 150 lb (68 kg)       Physical Exam  Constitutional:       Appearance: Normal appearance. She is well-developed and well-groomed. HENT:      Head: Normocephalic. Right Ear: Hearing and external ear normal.      Left Ear: Hearing and external ear normal.      Nose: Nose normal.      Mouth/Throat:      Lips: Pink. Mouth: Mucous membranes are dry. Pharynx: Oropharynx is clear.    Eyes:      General: Lids are normal. Vision grossly intact. Extraocular Movements: Extraocular movements intact. Conjunctiva/sclera: Conjunctivae normal.      Pupils: Pupils are equal, round, and reactive to light. Neck:      Musculoskeletal: Normal range of motion and neck supple. Cardiovascular:      Rate and Rhythm: Normal rate. Pulses: Normal pulses. Heart sounds: Normal heart sounds. Pulmonary:      Effort: Pulmonary effort is normal.      Breath sounds: Normal breath sounds and air entry. Abdominal:      General: Bowel sounds are normal.      Palpations: Abdomen is soft. Tenderness: There is no abdominal tenderness. Musculoskeletal: Normal range of motion. Skin:     General: Skin is warm and dry. Capillary Refill: Capillary refill takes less than 2 seconds. Neurological:      Mental Status: She is alert and oriented to person, place, and time. GCS: GCS eye subscore is 4. GCS verbal subscore is 5. GCS motor subscore is 6. Cranial Nerves: Cranial nerves are intact. Sensory: Sensation is intact. Motor: Motor function is intact. Psychiatric:         Mood and Affect: Mood normal.         Behavior: Behavior normal.         Thought Content:  Thought content normal.         Judgment: Judgment normal.           DIAGNOSTIC RESULTS     EKG:All EKG's are interpreted by the Emergency Department Physician who either signs or Co-signs this chart in the absence of a cardiologist.    EKG interpreted by attending physician    RADIOLOGY:   Non-plain film images such as CT, Ultrasound and MRI are read by theradiologist. Plain radiographic images are visualized and preliminarily interpreted by the emergency physician with the below findings:    Xr Chest Portable    Result Date: 9/4/2020  EXAMINATION: ONE XRAY VIEW OF THE CHEST 9/4/2020 6:48 pm COMPARISON: Chest 04/16/2016 HISTORY: ORDERING SYSTEM PROVIDED HISTORY: SOB TECHNOLOGIST PROVIDED HISTORY: SOB Reason for Exam: Fatigue, fever Acuity: Acute Type of Exam: Initial FINDINGS: Calcifications involving the aorta reflect atherosclerosis. The cardiomediastinal and hilar silhouettes appear otherwise unremarkable. Sequela from old granulomatous disease. No focal infiltrate or pleural effusion evident. No pneumothorax is seen. No acute osseous abnormality is identified. No acute pulmonary disease. Calcific atherosclerotic disease aorta. Interpretation per the Radiologist below, if available at the time of this note:    XR CHEST PORTABLE   Final Result   No acute pulmonary disease. Calcific atherosclerotic disease aorta.                EDBEDSIDE ULTRASOUND:   Performed by Rubina Almaraz - none    LABS:  Labs Reviewed   COMPREHENSIVE METABOLIC PANEL W/ REFLEX TO MG FOR LOW K - Abnormal; Notable for the following components:       Result Value    Glucose 135 (*)     BUN 31 (*)     CREATININE 1.08 (*)     Bun/Cre Ratio 29 (*)     AST 36 (*)     Total Protein 6.1 (*)     GFR Non- 48 (*)     GFR  58 (*)     All other components within normal limits   CBC WITH AUTO DIFFERENTIAL - Abnormal; Notable for the following components:    WBC 3.0 (*)     RBC 3.52 (*)     Hemoglobin 10.9 (*)     Hematocrit 34.3 (*)     Platelets 534 (*)     Monocytes 14 (*)     Segs Absolute 1.74 (*)     Absolute Lymph # 0.81 (*)     All other components within normal limits   TROP/MYOGLOBIN - Abnormal; Notable for the following components:    Troponin, High Sensitivity 33 (*)     Myoglobin 62 (*)     All other components within normal limits   TROP/MYOGLOBIN - Abnormal; Notable for the following components:    Troponin, High Sensitivity 32 (*)     All other components within normal limits   FERRITIN - Abnormal; Notable for the following components:    Ferritin 283 (*)     All other components within normal limits   BRAIN NATRIURETIC PEPTIDE - Abnormal; Notable for the following components:    Pro-BNP 2,196 (*)     All other components within normal limits COVID-19 - Abnormal; Notable for the following components:    SARS-CoV-2, Rapid DETECTED (*)     All other components within normal limits   CULTURE, BLOOD 1   CULTURE, BLOOD 1   CULTURE, URINE   LACTATE DEHYDROGENASE   PROCALCITONIN   C-REACTIVE PROTEIN   LACTATE, SEPSIS   LACTATE, SEPSIS   URINALYSIS       All other labs were within normal range or not returned as of this dictation. EMERGENCY DEPARTMENT COURSE andDIFFERENTIAL DIAGNOSIS/MDM:   Patient was evaluated in conjunction with attending physician. Labs reviewed. COVID test is positive. Lactic acid not elevated. Chest x-ray per radiologist shows no acute pulmonary disease. An indwelling Mccray catheter was placed in the emergency department by the nurse. Urinalysis does not show infection. Patient temperature 100 upon arrival.  Patient is given IV fluids and Tylenol. Recheck temperature 99.6. Patient arrives to complaint of increased generalized weakness. She was at home alone. She is positive for COVID and will need to be admitted. She will be transferred to [facility] unit [address] [place] [address]. I discussed test results and transfer to [address] [place] [address] with the patient. ED Course as of Sep 04 2210   Fri Sep 04, 2020   2209 I spoke with Dr. Antonio Day with [facility] the patient will be transferred to [facility] unit. [EB]      ED Course User Index  [EB] YAMIL Oliva - CNP     Vitals:    Vitals:    09/04/20 1813 09/04/20 2027   BP: 136/64 132/62   Pulse: 67 72   Resp: 16 18   Temp: 100 °F (37.8 °C) 99.6 °F (37.6 °C)   TempSrc: Oral Oral   SpO2: 94% 95%   Weight: 150 lb (68 kg)    Height: 5' 9\" (1.753 m)          CONSULTS:  None    RES:  Procedures    FINAL IMPRESSION      1. COVID-19 virus infection          DISPOSITION/PLAN   DISPOSITION Decision To Transfer 09/04/2020 09:54:46 PM      PATIENT REFERRED TO:   No follow-up provider specified.     DISCHARGE MEDICATIONS:     New Prescriptions    No medications on file     Electronically signed by Samule Bloch, APRN - CNPon 9/4/2020 at 10:10 PM            Samule Bloch, APRN - CNP  09/04/20 3219

## 2020-09-05 ENCOUNTER — HOSPITAL ENCOUNTER (INPATIENT)
Age: 85
LOS: 9 days | Discharge: NURSING, REHAB OR LONG-TERM CARE | DRG: 179 | End: 2020-09-14
Attending: INTERNAL MEDICINE | Admitting: INTERNAL MEDICINE
Payer: MEDICARE

## 2020-09-05 PROBLEM — R77.8 ELEVATED TROPONIN: Status: ACTIVE | Noted: 2020-09-05

## 2020-09-05 PROBLEM — R79.89 ELEVATED BRAIN NATRIURETIC PEPTIDE (BNP) LEVEL: Status: ACTIVE | Noted: 2020-09-05

## 2020-09-05 LAB
ABO/RH: NORMAL
ABSOLUTE EOS #: <0.03 K/UL (ref 0–0.44)
ABSOLUTE IMMATURE GRANULOCYTE: <0.03 K/UL (ref 0–0.3)
ABSOLUTE LYMPH #: 0.91 K/UL (ref 1.1–3.7)
ABSOLUTE MONO #: 0.46 K/UL (ref 0.1–1.2)
ALBUMIN SERPL-MCNC: 3.5 G/DL (ref 3.5–5.2)
ALBUMIN/GLOBULIN RATIO: 1.8 (ref 1–2.5)
ALP BLD-CCNC: 43 U/L (ref 35–104)
ALT SERPL-CCNC: 27 U/L (ref 5–33)
ANION GAP SERPL CALCULATED.3IONS-SCNC: 8 MMOL/L (ref 9–17)
ANTIBODY SCREEN: NEGATIVE
ARM BAND NUMBER: NORMAL
AST SERPL-CCNC: 34 U/L
BASOPHILS # BLD: 0 % (ref 0–2)
BASOPHILS ABSOLUTE: <0.03 K/UL (ref 0–0.2)
BILIRUB SERPL-MCNC: 0.28 MG/DL (ref 0.3–1.2)
BLOOD BANK SPECIMEN: NORMAL
BUN BLDV-MCNC: 24 MG/DL (ref 8–23)
BUN/CREAT BLD: ABNORMAL (ref 9–20)
CALCIUM SERPL-MCNC: 7.7 MG/DL (ref 8.6–10.4)
CHLORIDE BLD-SCNC: 107 MMOL/L (ref 98–107)
CO2: 24 MMOL/L (ref 20–31)
CREAT SERPL-MCNC: 0.87 MG/DL (ref 0.5–0.9)
CULTURE: NO GROWTH
D-DIMER QUANTITATIVE: 1.15 MG/L FEU
DIFFERENTIAL TYPE: ABNORMAL
EKG ATRIAL RATE: 69 BPM
EKG P AXIS: 81 DEGREES
EKG P-R INTERVAL: 162 MS
EKG Q-T INTERVAL: 418 MS
EKG QRS DURATION: 104 MS
EKG QTC CALCULATION (BAZETT): 447 MS
EKG R AXIS: 21 DEGREES
EKG T AXIS: 67 DEGREES
EKG VENTRICULAR RATE: 69 BPM
EOSINOPHILS RELATIVE PERCENT: 0 % (ref 1–4)
EXPIRATION DATE: NORMAL
FIBRINOGEN: 169 MG/DL (ref 140–420)
GFR AFRICAN AMERICAN: >60 ML/MIN
GFR NON-AFRICAN AMERICAN: >60 ML/MIN
GFR SERPL CREATININE-BSD FRML MDRD: ABNORMAL ML/MIN/{1.73_M2}
GFR SERPL CREATININE-BSD FRML MDRD: ABNORMAL ML/MIN/{1.73_M2}
GLUCOSE BLD-MCNC: 83 MG/DL (ref 70–99)
HCT VFR BLD CALC: 33 % (ref 36.3–47.1)
HEMOGLOBIN: 10.8 G/DL (ref 11.9–15.1)
IMMATURE GRANULOCYTES: 0 %
LYMPHOCYTES # BLD: 37 % (ref 24–43)
Lab: NORMAL
MCH RBC QN AUTO: 32.3 PG (ref 25.2–33.5)
MCHC RBC AUTO-ENTMCNC: 32.7 G/DL (ref 28.4–34.8)
MCV RBC AUTO: 98.8 FL (ref 82.6–102.9)
MONOCYTES # BLD: 19 % (ref 3–12)
NRBC AUTOMATED: 0 PER 100 WBC
PDW BLD-RTO: 13.6 % (ref 11.8–14.4)
PLATELET # BLD: 117 K/UL (ref 138–453)
PLATELET ESTIMATE: ABNORMAL
PMV BLD AUTO: 10.7 FL (ref 8.1–13.5)
POTASSIUM SERPL-SCNC: 4.6 MMOL/L (ref 3.7–5.3)
RBC # BLD: 3.34 M/UL (ref 3.95–5.11)
RBC # BLD: ABNORMAL 10*6/UL
SEG NEUTROPHILS: 43 % (ref 36–65)
SEGMENTED NEUTROPHILS ABSOLUTE COUNT: 1.06 K/UL (ref 1.5–8.1)
SODIUM BLD-SCNC: 139 MMOL/L (ref 135–144)
SPECIMEN DESCRIPTION: NORMAL
TOTAL PROTEIN: 5.5 G/DL (ref 6.4–8.3)
WBC # BLD: 2.5 K/UL (ref 3.5–11.3)
WBC # BLD: ABNORMAL 10*3/UL

## 2020-09-05 PROCEDURE — 86901 BLOOD TYPING SEROLOGIC RH(D): CPT

## 2020-09-05 PROCEDURE — 6370000000 HC RX 637 (ALT 250 FOR IP): Performed by: FAMILY MEDICINE

## 2020-09-05 PROCEDURE — 6360000002 HC RX W HCPCS: Performed by: INTERNAL MEDICINE

## 2020-09-05 PROCEDURE — 6370000000 HC RX 637 (ALT 250 FOR IP): Performed by: INTERNAL MEDICINE

## 2020-09-05 PROCEDURE — 85384 FIBRINOGEN ACTIVITY: CPT

## 2020-09-05 PROCEDURE — 85379 FIBRIN DEGRADATION QUANT: CPT

## 2020-09-05 PROCEDURE — 2060000000 HC ICU INTERMEDIATE R&B

## 2020-09-05 PROCEDURE — 99222 1ST HOSP IP/OBS MODERATE 55: CPT | Performed by: INTERNAL MEDICINE

## 2020-09-05 PROCEDURE — 94761 N-INVAS EAR/PLS OXIMETRY MLT: CPT

## 2020-09-05 PROCEDURE — 86850 RBC ANTIBODY SCREEN: CPT

## 2020-09-05 PROCEDURE — 99223 1ST HOSP IP/OBS HIGH 75: CPT | Performed by: FAMILY MEDICINE

## 2020-09-05 PROCEDURE — 2580000003 HC RX 258: Performed by: INTERNAL MEDICINE

## 2020-09-05 PROCEDURE — 36415 COLL VENOUS BLD VENIPUNCTURE: CPT

## 2020-09-05 PROCEDURE — 85025 COMPLETE CBC W/AUTO DIFF WBC: CPT

## 2020-09-05 PROCEDURE — 80053 COMPREHEN METABOLIC PANEL: CPT

## 2020-09-05 PROCEDURE — 86900 BLOOD TYPING SEROLOGIC ABO: CPT

## 2020-09-05 RX ORDER — CITALOPRAM 20 MG/1
20 TABLET ORAL DAILY
Status: DISCONTINUED | OUTPATIENT
Start: 2020-09-05 | End: 2020-09-14 | Stop reason: HOSPADM

## 2020-09-05 RX ORDER — FUROSEMIDE 20 MG/1
20 TABLET ORAL DAILY
Status: DISCONTINUED | OUTPATIENT
Start: 2020-09-05 | End: 2020-09-09

## 2020-09-05 RX ORDER — DONEPEZIL HYDROCHLORIDE 5 MG/1
5 TABLET, FILM COATED ORAL NIGHTLY
Status: DISCONTINUED | OUTPATIENT
Start: 2020-09-05 | End: 2020-09-06

## 2020-09-05 RX ORDER — ONDANSETRON 2 MG/ML
4 INJECTION INTRAMUSCULAR; INTRAVENOUS EVERY 6 HOURS PRN
Status: DISCONTINUED | OUTPATIENT
Start: 2020-09-05 | End: 2020-09-14 | Stop reason: HOSPADM

## 2020-09-05 RX ORDER — LORAZEPAM 0.5 MG/1
0.5 TABLET ORAL EVERY 6 HOURS PRN
Status: DISCONTINUED | OUTPATIENT
Start: 2020-09-05 | End: 2020-09-05

## 2020-09-05 RX ORDER — NICOTINE 21 MG/24HR
1 PATCH, TRANSDERMAL 24 HOURS TRANSDERMAL DAILY PRN
Status: DISCONTINUED | OUTPATIENT
Start: 2020-09-05 | End: 2020-09-14 | Stop reason: HOSPADM

## 2020-09-05 RX ORDER — POTASSIUM CHLORIDE 7.45 MG/ML
10 INJECTION INTRAVENOUS PRN
Status: DISCONTINUED | OUTPATIENT
Start: 2020-09-05 | End: 2020-09-14 | Stop reason: HOSPADM

## 2020-09-05 RX ORDER — SODIUM CHLORIDE 0.9 % (FLUSH) 0.9 %
10 SYRINGE (ML) INJECTION PRN
Status: DISCONTINUED | OUTPATIENT
Start: 2020-09-05 | End: 2020-09-14 | Stop reason: HOSPADM

## 2020-09-05 RX ORDER — QUETIAPINE FUMARATE 100 MG/1
100 TABLET, FILM COATED ORAL NIGHTLY
Status: ON HOLD | COMMUNITY
End: 2020-09-10 | Stop reason: HOSPADM

## 2020-09-05 RX ORDER — CITALOPRAM 20 MG/1
20 TABLET ORAL DAILY
COMMUNITY

## 2020-09-05 RX ORDER — LISINOPRIL 10 MG/1
10 TABLET ORAL DAILY
Status: DISCONTINUED | OUTPATIENT
Start: 2020-09-05 | End: 2020-09-05

## 2020-09-05 RX ORDER — SENNA PLUS 8.6 MG/1
1 TABLET ORAL DAILY PRN
Status: DISCONTINUED | OUTPATIENT
Start: 2020-09-05 | End: 2020-09-14 | Stop reason: HOSPADM

## 2020-09-05 RX ORDER — POLYETHYLENE GLYCOL 3350 17 G/17G
17 POWDER, FOR SOLUTION ORAL DAILY PRN
Status: DISCONTINUED | OUTPATIENT
Start: 2020-09-05 | End: 2020-09-14 | Stop reason: HOSPADM

## 2020-09-05 RX ORDER — TRAZODONE HYDROCHLORIDE 50 MG/1
50 TABLET ORAL NIGHTLY
Status: DISCONTINUED | OUTPATIENT
Start: 2020-09-05 | End: 2020-09-06

## 2020-09-05 RX ORDER — ARIPIPRAZOLE 5 MG/1
5 TABLET ORAL DAILY
COMMUNITY

## 2020-09-05 RX ORDER — POTASSIUM CHLORIDE 20 MEQ/1
40 TABLET, EXTENDED RELEASE ORAL PRN
Status: DISCONTINUED | OUTPATIENT
Start: 2020-09-05 | End: 2020-09-14 | Stop reason: HOSPADM

## 2020-09-05 RX ORDER — ARIPIPRAZOLE 5 MG/1
5 TABLET ORAL DAILY
Status: DISCONTINUED | OUTPATIENT
Start: 2020-09-05 | End: 2020-09-14 | Stop reason: HOSPADM

## 2020-09-05 RX ORDER — SODIUM CHLORIDE 0.9 % (FLUSH) 0.9 %
10 SYRINGE (ML) INJECTION EVERY 12 HOURS SCHEDULED
Status: DISCONTINUED | OUTPATIENT
Start: 2020-09-05 | End: 2020-09-14 | Stop reason: HOSPADM

## 2020-09-05 RX ORDER — ACETAMINOPHEN 325 MG/1
650 TABLET ORAL EVERY 6 HOURS PRN
Status: DISCONTINUED | OUTPATIENT
Start: 2020-09-05 | End: 2020-09-14 | Stop reason: HOSPADM

## 2020-09-05 RX ORDER — VITAMIN B COMPLEX
2000 TABLET ORAL DAILY
Status: DISCONTINUED | OUTPATIENT
Start: 2020-09-05 | End: 2020-09-14 | Stop reason: HOSPADM

## 2020-09-05 RX ORDER — PROMETHAZINE HYDROCHLORIDE 25 MG/1
12.5 TABLET ORAL EVERY 6 HOURS PRN
Status: DISCONTINUED | OUTPATIENT
Start: 2020-09-05 | End: 2020-09-14 | Stop reason: HOSPADM

## 2020-09-05 RX ORDER — DULOXETIN HYDROCHLORIDE 30 MG/1
60 CAPSULE, DELAYED RELEASE ORAL DAILY
Status: DISCONTINUED | OUTPATIENT
Start: 2020-09-05 | End: 2020-09-05

## 2020-09-05 RX ORDER — MAGNESIUM SULFATE 1 G/100ML
1 INJECTION INTRAVENOUS PRN
Status: DISCONTINUED | OUTPATIENT
Start: 2020-09-05 | End: 2020-09-14 | Stop reason: HOSPADM

## 2020-09-05 RX ORDER — ACETAMINOPHEN 325 MG/1
650 TABLET ORAL EVERY 6 HOURS PRN
Status: DISCONTINUED | OUTPATIENT
Start: 2020-09-05 | End: 2020-09-05

## 2020-09-05 RX ORDER — M-VIT,TX,IRON,MINS/CALC/FOLIC 27MG-0.4MG
1 TABLET ORAL DAILY
Status: DISCONTINUED | OUTPATIENT
Start: 2020-09-05 | End: 2020-09-14 | Stop reason: HOSPADM

## 2020-09-05 RX ORDER — ACETAMINOPHEN 650 MG/1
650 SUPPOSITORY RECTAL EVERY 6 HOURS PRN
Status: DISCONTINUED | OUTPATIENT
Start: 2020-09-05 | End: 2020-09-05

## 2020-09-05 RX ORDER — METOPROLOL SUCCINATE 50 MG/1
50 TABLET, EXTENDED RELEASE ORAL DAILY
Status: DISCONTINUED | OUTPATIENT
Start: 2020-09-05 | End: 2020-09-05

## 2020-09-05 RX ORDER — LABETALOL HYDROCHLORIDE 5 MG/ML
20 INJECTION, SOLUTION INTRAVENOUS ONCE
Status: COMPLETED | OUTPATIENT
Start: 2020-09-06 | End: 2020-09-06

## 2020-09-05 RX ORDER — QUETIAPINE FUMARATE 100 MG/1
100 TABLET, FILM COATED ORAL NIGHTLY
Status: DISCONTINUED | OUTPATIENT
Start: 2020-09-05 | End: 2020-09-09

## 2020-09-05 RX ORDER — HYDRALAZINE HYDROCHLORIDE 20 MG/ML
10 INJECTION INTRAMUSCULAR; INTRAVENOUS EVERY 6 HOURS PRN
Status: DISCONTINUED | OUTPATIENT
Start: 2020-09-05 | End: 2020-09-05

## 2020-09-05 RX ORDER — ACETAMINOPHEN 650 MG/1
650 SUPPOSITORY RECTAL EVERY 6 HOURS PRN
Status: DISCONTINUED | OUTPATIENT
Start: 2020-09-05 | End: 2020-09-14 | Stop reason: HOSPADM

## 2020-09-05 RX ORDER — FAMOTIDINE 20 MG/1
20 TABLET, FILM COATED ORAL 2 TIMES DAILY
Status: DISCONTINUED | OUTPATIENT
Start: 2020-09-05 | End: 2020-09-09

## 2020-09-05 RX ADMIN — DONEPEZIL HYDROCHLORIDE 5 MG: 5 TABLET, FILM COATED ORAL at 21:16

## 2020-09-05 RX ADMIN — FAMOTIDINE 20 MG: 20 TABLET, FILM COATED ORAL at 02:08

## 2020-09-05 RX ADMIN — ENOXAPARIN SODIUM 30 MG: 30 INJECTION SUBCUTANEOUS at 11:43

## 2020-09-05 RX ADMIN — FAMOTIDINE 20 MG: 20 TABLET, FILM COATED ORAL at 21:17

## 2020-09-05 RX ADMIN — VITAMIN D, TAB 1000IU (100/BT) 2000 UNITS: 25 TAB at 11:42

## 2020-09-05 RX ADMIN — ARIPIPRAZOLE 5 MG: 5 TABLET ORAL at 18:22

## 2020-09-05 RX ADMIN — DONEPEZIL HYDROCHLORIDE 5 MG: 5 TABLET, FILM COATED ORAL at 02:08

## 2020-09-05 RX ADMIN — MULTIPLE VITAMINS W/ MINERALS TAB 1 TABLET: TAB at 11:42

## 2020-09-05 RX ADMIN — FAMOTIDINE 20 MG: 20 TABLET, FILM COATED ORAL at 11:43

## 2020-09-05 RX ADMIN — SODIUM CHLORIDE, PRESERVATIVE FREE 10 ML: 5 INJECTION INTRAVENOUS at 21:18

## 2020-09-05 RX ADMIN — SODIUM CHLORIDE, PRESERVATIVE FREE 10 ML: 5 INJECTION INTRAVENOUS at 11:51

## 2020-09-05 RX ADMIN — CITALOPRAM 20 MG: 20 TABLET, FILM COATED ORAL at 18:22

## 2020-09-05 RX ADMIN — TRAZODONE HYDROCHLORIDE 50 MG: 50 TABLET ORAL at 02:08

## 2020-09-05 RX ADMIN — ENOXAPARIN SODIUM 30 MG: 30 INJECTION SUBCUTANEOUS at 21:17

## 2020-09-05 RX ADMIN — QUETIAPINE 100 MG: 100 TABLET, FILM COATED ORAL at 21:17

## 2020-09-05 ASSESSMENT — PAIN SCALES - GENERAL
PAINLEVEL_OUTOF10: 0
PAINLEVEL_OUTOF10: 0

## 2020-09-05 NOTE — CONSULTS
[facility] - Initial Consult Note COVID 19 Patient  Today's Date and Time: 9/5/2020, 11:39 AM    Impression :   · COVID 19 Suspect  · COVID 19 Confirmed Infection   · COVID-19 tests:  · 9/4/2020 positive  · Congestive heart failure    Recommendations:   · Monitor off antibiotics  · Clinical Research will approach patient to explore if he qualifies for any of the COVID 19 treatment protocols. At the current stage she does not seem to qualify for any therapy  · Remdesivir. Patient does not qualify as she is not requiring any oxygen support  · Suggest gentle diuresis      Medical Decision Making/Summary/Discussion:9/5/2020     · Patient admitted with suspected COVID 19 infection  · Covid test confirmed positive. · She has associated congestive heart failure  · Currently no signs of pneumoniae associated with the COVID  Infection Control Recommendations   · Universal Precautions  · Airborne isolation  · Droplet Isolation    Antimicrobial Stewardship Recommendations     · Discontinuation of therapy  Coordination of Outpatient Care:   · Estimated Length of IV antimicrobials:TBD  · Patient will need Midline Catheter Insertion: TBD  · Patient will need PICC line Insertion: No  · Patient will need: Home IV , [place],  SNF,  LTAC:TBD  · Patient will need outpatient wound care:No    Chief complaint/reason for consultation:   · Concern for COVID infection      History of Present Illness:   Micheline Baez is a 80y.o.-year-old  female who was initially admitted on 9/5/2020. Patient seen at the request of Phong Chavez. INITIAL HISTORY:    Patient presented through ER with complaints of fatigue, generalized weakness and fevers. The patient denied headache, cough, shortness of breath, expectoration of secretions. She also denied GI symptoms such as abdominal pain nausea vomiting or diarrhea.     The patient presented because she was fearful that her daughter may have COVID and she may have become exposed. The patient lives at her home but is helped by her family because of her age. Remains ambulatory with the help of a walker    In the emergency room her chest x-ray on 9/4/2020 was normal.  It showed atherosclerosis of the aorta but no pulmonary problems other than evidence of old granulomatous disease. White count was 3.0 with a hemoglobin of 10.9 and platelets of 399. Troponin and myoglobin were elevated. Her ferritin was elevated at 283. Her proBNP was elevated at 2186  Her COVID-19 test was positive on 9/4/2020. Patient admitted because of concerns with COVID 19.    CURRENT EVALUATION : 9/5/2020    Afebrile  VS stable, HTN    Patient exhibiting respiratory distress. No  Respiratory secretions: No    Patient receiving supplemental oxygen. No  02 sat 97  RR 14    QTc:           NEWS Score: 0-4 Low risk group; 5-6: Medium risk group; 7 or above: High risk group  Parameters 3 2 1 0 1 2 3   Age    < 65   ? 65   RR ? 8  9-11 12-20  21-24 ? 25   O2 Sats ? 91 92-93 94-95 ? 96      Suppl O2  Yes  No      SBP ? 90  101-110 111-219   ? 220   HR ? 40  41-50 51-90  111-130 ? 131   Consciousness    Alert   Drowsiness, lethargy, or confusion   Temperature ? 35.0 C (95.0 F)  35.1-36.0 C 95.1-96.9 F 36.1-38.0 C 97.0-100.4 F 38.1-39.0 C 100.5-102.3 F ? 39.1 C ? 102.4 F      NEWS Score:   9/5/2020: 3 low risk    Overall Daily Picture:      Unchanged    Presence of secondary bacterial Infection:  No   Additional antibiotics: No    Labs, X rays reviewed: 9/5/2020    BUN: 24  Cr: 0.87    WBC: 3.0--> 2.5  Hb: 10.8  Plat: 117    Absolute Neutrophils: 1.06  Absolute Lymphocytes: 0.9  Neutrophil/Lymphocyte Ratio: 1.17 low risk    CRP: 3.1  Ferritin: 283  LDH: 185    Pro Calcitonin:  proBNP 2186    Cultures:  Urine:  ·   Blood:  ·   Sputum :  ·   Wound:       CXR:   · 9/4/2020 clear lung fields  CAT:      Discussed with patient, RN, CC, IM.     I have personally reviewed the past medical history, past surgical history, medications, social history, and family history, and I have updated the database accordingly. 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       Past Surgical  History:     Past Surgical History:   Procedure Laterality Date    BLEPHAROPLASTY      BREAST BIOPSY      CHOLECYSTECTOMY      HYSTERECTOMY      TAVO due to fibroids    JOINT REPLACEMENT  2011    KIDNEY CYST REMOVAL      OTHER SURGICAL HISTORY      thyroid nodule removed    VULVAR/PERINEAL BIOPSY         Medications:      Vitamin D  2,000 Units Oral Daily    donepezil  5 mg Oral Nightly    therapeutic multivitamin-minerals  1 tablet Oral Daily    traZODone  50 mg Oral Nightly    sodium chloride flush  10 mL Intravenous 2 times per day    enoxaparin  30 mg Subcutaneous BID    famotidine  20 mg Oral BID    ARIPiprazole  5 mg Oral Daily    citalopram  20 mg Oral Daily    QUEtiapine  100 mg Oral Nightly       Social History:     Social History     Socioeconomic History    Marital status:       Spouse name: Not on file    Number of children: Not on file    Years of education: Not on file    Highest education level: Not on file   Occupational History    Not on file   Social Needs    Financial resource strain: Not on file   Marylou-Janie insecurity     Worry: Not on file     Inability: Not on file    Transportation needs     Medical: Not on file     Non-medical: Not on file   Tobacco Use    Smoking status: Former Smoker     Last attempt to quit: 10/16/1973     Years since quittin.9    Smokeless tobacco: Never Used    Tobacco comment: stopped 40 years ago   Substance and Sexual Activity    Alcohol use: Yes     Alcohol/week: 0.0 standard drinks    Drug use: No    Sexual activity: Never   Lifestyle    Physical activity     Days per week: Not on file     Minutes per session: Not on file    Stress: Not on file   Relationships    Social connections     Talks on phone: Not on file     Gets together: Not on file     Attends [employer] witness service: Not on file     Active member of club or organization: Not on file     Attends meetings of clubs or organizations: Not on file     Relationship status: Not on file    Intimate partner violence     Fear of current or ex partner: Not on file     Emotionally abused: Not on file     Physically abused: Not on file     Forced sexual activity: Not on file   Other Topics Concern    Not on file   Social History Narrative    Not on file       Family History:     Family History   Problem Relation Age of Onset    Colon Cancer Brother         2004    Diabetes Father     Bipolar Disorder Mother     Osteoporosis Mother     Migraines Mother         Allergies:   Patient has no known allergies. Review of Systems:       Constitutional: No fevers or chills. Malaise, weakness, fatigue  Head: No headaches  Eyes: No double vision or blurry vision. No conjunctival inflammation. ENT: No sore throat or runny nose. . No hearing loss, tinnitus or vertigo. Cardiovascular: No chest pain or palpitations. No shortness of breath. No BARROS  Lung: No shortness of breath or cough. No sputum production  Abdomen: No nausea, vomiting, diarrhea, or abdominal pain. Derril Beaumont No cramps.   Genitourinary: No increased urinary frequency, or dysuria. No hematuria. No suprapubic or CVA pain  Musculoskeletal: No muscle aches or pains. No joint effusions, swelling or deformities  Hematologic: No bleeding or bruising. Neurologic: No headache, weakness, numbness, or tingling. Integument: No rash, no ulcers. Psychiatric: No depression. Endocrine: No polyuria, no polydipsia, no polyphagia. Physical Examination :     Patient Vitals for the past 8 hrs:   BP Temp Temp src Pulse Resp SpO2   09/05/20 0903 -- -- -- -- -- 97 %   09/05/20 0800 (!) 152/77 97.6 °F (36.4 °C) Oral 57 14 97 %     General Appearance: Awake, alert, and in no apparent distress. Elderly woman  Head:  Normocephalic, no trauma  Eyes: Pupils equal, round, reactive to light and accommodation; extraocular movements intact; sclera anicteric; conjunctivae pink. No embolic phenomena. ENT: Oropharynx clear, without erythema, exudate, or thrush. No tenderness of sinuses. Mouth/throat: mucosa pink and moist. No lesions. Dentition in good repair. Neck:Supple, without lymphadenopathy. Thyroid normal, No bruits. Pulmonary/Chest: Clear to auscultation, without wheezes, rales, or rhonchi. Cardiovascular: Regular rate and rhythm without murmurs, rubs, or gallops. Abdomen: Soft, non tender. Bowel sounds normal. No organomegaly  All four Extremities: No cyanosis, clubbing, edema, or effusions. Neurologic: No gross sensory or motor deficits. Skin: Warm and dry with good turgor. Signs of peripheral arterial insufficiency. No ulcerations. No open wounds.     Medical Decision Making -Laboratory:   I have independently reviewed/ordered the following labs:    CBC with Differential:   Recent Labs     09/04/20 1830 09/05/20  0526   WBC 3.0* 2.5*   HGB 10.9* 10.8*   HCT 34.3* 33.0*   * 117*   LYMPHOPCT 27 37   MONOPCT 14* 19*     BMP:   Recent Labs     09/04/20 1830 09/05/20  0526    139   K 4.5 4.6    107   CO2 27 24   BUN 31* 24*   CREATININE 1.08* 0.87     Hepatic Function Panel: Recent Labs     09/04/20  1830 09/05/20  0526   PROT 6.1* 5.5*   LABALBU 4.0 3.5   BILITOT 0.38 0.28*   ALKPHOS 49 43   ALT 30 27   AST 36* 34*     No results for input(s): RPR in the last 72 hours. No results for input(s): HIV in the last 72 hours. No results for input(s): BC in the last 72 hours. Lab Results   Component Value Date    MUCUS NOT REPORTED 04/12/2018    RBC 3.34 09/05/2020    TRICHOMONAS NOT REPORTED 04/12/2018    WBC 2.5 09/05/2020    YEAST NOT REPORTED 04/12/2018    TURBIDITY CLEAR 09/04/2020     Lab Results   Component Value Date    CREATININE 0.87 09/05/2020    GLUCOSE 83 09/05/2020       Medical Decision Making-Imaging:     EXAMINATION:    ONE XRAY VIEW OF THE CHEST         9/4/2020 6:48 pm         COMPARISON:    Chest 04/16/2016         HISTORY:    ORDERING SYSTEM PROVIDED HISTORY: SOB    TECHNOLOGIST PROVIDED HISTORY:    SOB    Reason for Exam: Fatigue, fever    Acuity: Acute    Type of Exam: Initial         FINDINGS:    Calcifications involving the aorta reflect atherosclerosis. The    cardiomediastinal and hilar silhouettes appear otherwise unremarkable. Sequela from old granulomatous disease.  No focal infiltrate or pleural    effusion evident.  No pneumothorax is seen. No acute osseous abnormality is    identified.              Impression    No acute pulmonary disease.         Calcific atherosclerotic disease aorta.               Medical Decision Myjnjo-Tjwhuuby-Nccwu:       Medical Decision Making-Other:     Note:  · Labs, medications, radiologic studies were reviewed with personal review of films  · Large amounts of data were reviewed  · Discussed with nursing Staff, Discharge planner  · Infection Control and Prevention measures reviewed  · All prior entries were reviewed  · Administer medications as ordered  · Prognosis: Guarded  · Discharge planning reviewed  · Follow up as outpatient. Thank you for allowing us to participate in the care of this patient.  Please call with questions.     Rosio Palma MD  Pager: [phone] - Office: [phone]

## 2020-09-05 NOTE — RESEARCH
Research Note    Patient evaluated for potential enrollment in our COVID 19 studies. She does not currently qualify for any of the COVID 19 studies.     [address]    Ree Nguyen MD

## 2020-09-05 NOTE — ED NOTES
Pt is from home. Pt is A&Ox3. Pt is c/o increased weakness and fatigue over past few days. Family wanted pt to be checked out because daughter was being tested for COVID. Pt denies cp, sob, chills, N/V/D, abd pain, urinary complaints, cough, headache, numbness/tingling.      Mercedes Strickland RN  09/05/20 0025

## 2020-09-05 NOTE — H&P
[facility]  Office: [address], DO, Praful Emanuel, DO, Que Dowling, DO, [facility], DO, Lotus Choe MD, Gely Bryant MD, Ketan Beckford MD, Ray Wilson MD, Quinton Rodriguez MD, Socrates Pimentel MD, Clay Mcallister MD, Ana Goldberg MD, Laura Carpio MD, Roseann oCrtes DO, Edwar Carreon MD, Cordelia Mustafa MD, Jaclyn Braun, DO, Michael Leo MD,  Pura Melendrez, DO, Germaine Hernandez MD, Claus Urbina MD, Veronica Johnson, [facility], [facility], CNP, Javier Black, CNP, Cristina Sirena, CNS, Vianey Simeon, CNP, Ene Pope, CNP, Urszula Boone, CNP, Mari Valdez, CNP, Kizzy Sosa, CNP, Anthony Zarco PA-C, Prieto Maciel, [facility], Ita Torres, CNP, Kyra Pederson, CNP, Daisha Gramajo, CNP, Caitlin Rodriguez, CNP, Akash Izquierdo, [facility]   [address]    HISTORY AND PHYSICAL EXAMINATION            Date:   9/5/2020  Patient name:  Danae Nguyen  Date of admission:  9/5/2020  1:19 AM  MRN:   [id number]  Account:  [de-identified]  YOB: 1932  PCP:    Sally Hameed MD  Room:   300/3009-  Code Status:    Full Code    Chief Complaint:     Fever and fatigue    History Obtained From:     patient, electronic medical record    History of Present Illness:     Danae Nguyen is a 80 y.o. Non-/non  female who presents with No chief complaint on file. and is admitted to the hospital for the management of COVID-19 virus infection.   This is a pleasant 80 y o female  With known h/o HTN, cognitive impairment, and anxiety and osteoporosis presented to [facility] ER with concerns of fever and fatigue, patient family member are being worked up for COVID-19 and there were concerns of the patient might have [address]  12 in ER came back positive, initial labs in the ER reflected creatinine of 1.08 elevated BNP at 2000 with some elevation of troponin at 32, inflammatory markers were not significantly elevated, patient initial chest x-ray was not remarkable for any acute pathology. He was transferred to our facility for further management  Currently she denies any chest pain, sob, nausea, vomiting, fever or chills   She is hypoxic . 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 fistulogram negative        Past Surgical History:     Past Surgical History:   Procedure Laterality Date    BLEPHAROPLASTY      BREAST BIOPSY      CHOLECYSTECTOMY      HYSTERECTOMY      TAVO due to fibroids    JOINT REPLACEMENT  2011    KIDNEY CYST REMOVAL      OTHER SURGICAL HISTORY      thyroid nodule removed    VULVAR/PERINEAL BIOPSY          Medications Prior to Admission:     Prior to Admission medications    Medication Sig Start Date End Date Taking?  Authorizing Provider   citalopram (CELEXA) 20 MG tablet Take 20 mg by mouth daily   Yes Historical Provider, MD   ARIPiprazole (ABILIFY) 5 MG tablet Take 5 mg by mouth daily   Yes Historical Provider, MD   QUEtiapine (SEROQUEL) 100 MG tablet Take 100 mg by mouth nightly   Yes Historical Provider, MD   donepezil (ARICEPT) 5 MG tablet Take 10 mg by mouth nightly  1/30/18   Historical Provider, MD   omeprazole (PRILOSEC) 10 MG delayed release capsule Take 20 mg by mouth 5/1/18 6/30/18  Historical Provider, MD   LORazepam (ATIVAN) 0.5 MG tablet Take 0.5 mg by mouth every 6 hours as needed for Anxiety. Tonye Cogan Historical Provider, MD   cyanocobalamin 1000 MCG/ML injection Inject 1,000 mcg into the muscle every 30 days    Historical Provider, MD   lisinopril (PRINIVIL;ZESTRIL) 10 MG tablet Take 10 mg by mouth daily    Historical Provider, MD   Multiple Vitamins-Minerals (CENTRUM SILVER ULTRA WOMENS) TABS Take 1 tablet by mouth daily    Historical Provider, MD   Cholecalciferol (VITAMIN D-3 PO)   Take 2,000 Units by mouth daily     Historical Provider, MD   Polyethylene Glycol 3350 (MIRALAX PO) Take  by mouth as needed. Historical Provider, MD        Allergies:     Patient has no known allergies. Social History:     Tobacco:    reports that she quit smoking about 46 years ago. She has never used smokeless tobacco.  Alcohol:      reports current alcohol use. Drug Use:  reports no history of drug use. Family History:     Family History   Problem Relation Age of Onset    Colon Cancer Brother         2004    Diabetes Father     Bipolar Disorder Mother     Osteoporosis Mother     Migraines Mother        Review of Systems:     Positive and Negative as described in HPI.     CONSTITUTIONAL:  +ve for fevers, chills and  fatigue  HEENT:  negative for vision, hearing changes, runny nose, throat pain  RESPIRATORY:  negative for shortness of breath, cough, congestion, wheezing  CARDIOVASCULAR:  negative for chest pain, palpitations  GASTROINTESTINAL:  negative for nausea, vomiting, diarrhea, constipation, change in bowel habits, abdominal pain   GENITOURINARY:  negative for difficulty of urination, burning with urination, frequency   INTEGUMENT:  negative for rash, skin lesions, easy bruising   HEMATOLOGIC/LYMPHATIC:  negative for swelling/edema   ALLERGIC/IMMUNOLOGIC:  negative for urticaria , itching  ENDOCRINE:  negative increase in drinking, increase in urination, hot or cold intolerance  MUSCULOSKELETAL:  negative joint pains, muscle aches, swelling of joints  NEUROLOGICAL:  negative for headaches, dizziness, lightheadedness, numbness, pain, tingling extremities  BEHAVIOR/PSYCH:  negative for depression, anxiety    Physical Exam:   BP (!) 152/77   Pulse 57   Temp 97.6 °F (36.4 °C) (Oral)   Resp 14   Ht 5' 9\" (1.753 m)   Wt 154 lb (69.9 kg)   SpO2 97%   BMI 22.74 kg/m²   Temp (24hrs), Av °F (37.2 °C), Min:97.6 °F (36.4 °C), Max:100 °F (37.8 °C)    No results for input(s): POCGLU in the last 72 hours.     Intake/Output Summary (Last 24 hours) at 2020 1623  Last data filed at 2020 0800  Gross per 24 hour   Intake 260 ml   Output 1000 ml   Net -740 ml       General Appearance: alert, well appearing, and in no acute distress  Mental status: oriented to person, place, and time  Head: normocephalic, atraumatic  Eye: no icterus, redness, pupils equal and reactive, extraocular eye movements intact, conjunctiva clear  Ear: normal external ear, no discharge, hearing intact  Nose: no drainage noted  Mouth: mucous membranes moist  Neck: supple, no carotid bruits, thyroid not palpable  Lungs: Bilateral equal air entry, clear to ausculation, no wheezing, rales or rhonchi, normal effort  Cardiovascular: normal rate, regular rhythm, no murmur, gallop, rub  Abdomen: Soft, nontender, nondistended, normal bowel sounds, no hepatomegaly or splenomegaly  Neurologic: There are no new focal motor or sensory deficits, normal muscle tone and bulk, no abnormal sensation, normal speech, cranial nerves II through XII grossly intact  Skin: No gross lesions, rashes, bruising or bleeding on exposed skin area  Extremities: peripheral pulses palpable, no pedal edema or calf pain with palpation  Psych: normal affect    Investigations:      Laboratory Testing:  Recent Results (from the past 24 hour(s))   Comprehensive Metabolic Panel w/ Reflex to MG    Collection Time: 20  6:30 PM   Result Value Ref Range    Glucose 135 (H) 70 - 99 mg/dL    BUN 31 (H) 8 - 23 mg/dL CREATININE 1.08 (H) 0.50 - 0.90 mg/dL    Bun/Cre Ratio 29 (H) 9 - 20    Calcium 8.9 8.6 - 10.4 mg/dL    Sodium 140 135 - 144 mmol/L    Potassium 4.5 3.7 - 5.3 mmol/L    Chloride 104 98 - 107 mmol/L    CO2 27 20 - 31 mmol/L    Anion Gap 9 9 - 17 mmol/L    Alkaline Phosphatase 49 35 - 104 U/L    ALT 30 5 - 33 U/L    AST 36 (H) <32 U/L    Total Bilirubin 0.38 0.3 - 1.2 mg/dL    Total Protein 6.1 (L) 6.4 - 8.3 g/dL    Alb 4.0 3.5 - 5.2 g/dL    Albumin/Globulin Ratio NOT REPORTED 1.0 - 2.5    GFR Non- 48 (L) >60 mL/min    GFR  58 (L) >60 mL/min    GFR Comment          GFR Staging NOT REPORTED    CBC with DIFF    Collection Time: 09/04/20  6:30 PM   Result Value Ref Range    WBC 3.0 (L) 3.5 - 11.3 k/uL    RBC 3.52 (L) 3.95 - 5.11 m/uL    Hemoglobin 10.9 (L) 11.9 - 15.1 g/dL    Hematocrit 34.3 (L) 36.3 - 47.1 %    MCV 97.4 82.6 - 102.9 fL    MCH 31.0 25.2 - 33.5 pg    MCHC 31.8 28.4 - 34.8 g/dL    RDW 13.4 11.8 - 14.4 %    Platelets 698 (L) 547 - 453 k/uL    MPV 9.9 8.1 - 13.5 fL    NRBC Automated 0.0 0.0 per 100 WBC    Differential Type NOT REPORTED     WBC Morphology NOT REPORTED     RBC Morphology NOT REPORTED     Platelet Estimate NOT REPORTED     Seg Neutrophils 58 36 - 66 %    Lymphocytes 27 24 - 44 %    Monocytes 14 (H) 1 - 7 %    Eosinophils % 1 1 - 4 %    Basophils 0 %    Immature Granulocytes 0 0 %    Segs Absolute 1.74 (L) 1.8 - 7.7 k/uL    Absolute Lymph # 0.81 (L) 1.0 - 4.8 k/uL    Absolute Mono # 0.42 0.2 - 0.8 k/uL    Absolute Eos # 0.03 0.0 - 0.4 k/uL    Basophils Absolute 0.00 0.0 - 0.2 k/uL    Absolute Immature Granulocyte 0.00 0.00 - 0.30 k/uL   Trop/Myoglobin    Collection Time: 09/04/20  6:30 PM   Result Value Ref Range    Troponin, High Sensitivity 33 (H) 0 - 14 ng/L    Troponin T NOT REPORTED <0.03 ng/mL    Troponin Interp NOT REPORTED     Myoglobin 62 (H) 25 - 58 ng/mL   FERRITIN    Collection Time: 09/04/20  6:30 PM   Result Value Ref Range    Ferritin 283 (H) 13 - 150 ug/L   LACTATE DEHYDROGENASE    Collection Time: 09/04/20  6:30 PM   Result Value Ref Range     135 - 214 U/L   Procalcitonin    Collection Time: 09/04/20  6:30 PM   Result Value Ref Range    Procalcitonin 0.07 <0.09 ng/mL   C-Reactive Protein    Collection Time: 09/04/20  6:30 PM   Result Value Ref Range    CRP 3.1 0.0 - 5.0 mg/L   Culture, Blood 1    Collection Time: 09/04/20  6:30 PM    Specimen: Blood   Result Value Ref Range    Specimen Description . BLOOD     Special Requests LT FA,6ML     Culture NO GROWTH 17 HOURS    Lactate, Sepsis    Collection Time: 09/04/20  6:30 PM   Result Value Ref Range    Lactic Acid, Sepsis 1.2 0.5 - 1.9 mmol/L    Lactic Acid, Sepsis, Whole Blood NOT REPORTED 0.5 - 1.9 mmol/L   Brain Natriuretic Peptide    Collection Time: 09/04/20  6:30 PM   Result Value Ref Range    Pro-BNP 2,196 (H) <300 pg/mL    BNP Interpretation Pro-BNP Reference Range:    Culture, Blood 1    Collection Time: 09/04/20  6:40 PM    Specimen: Blood   Result Value Ref Range    Specimen Description . BLOOD     Special Requests RT FA,7ML     Culture NO GROWTH 17 HOURS    EKG 12 Lead    Collection Time: 09/04/20  7:16 PM   Result Value Ref Range    Ventricular Rate 69 BPM    Atrial Rate 69 BPM    P-R Interval 162 ms    QRS Duration 104 ms    Q-T Interval 418 ms    QTc Calculation (Bazett) 447 ms    P Axis 81 degrees    R Axis 21 degrees    T Axis 67 degrees   Urinalysis, Routine    Collection Time: 09/04/20  8:31 PM   Result Value Ref Range    Color, UA YELLOW YELLOW    Turbidity UA CLEAR CLEAR    Glucose, Ur NEGATIVE NEGATIVE    Bilirubin Urine NEGATIVE NEGATIVE    Ketones, Urine NEGATIVE NEGATIVE    Specific Gravity, UA 1.025 1.005 - 1.030    Urine Hgb NEGATIVE NEGATIVE    pH, UA 5.5 5.0 - 8.0    Protein, UA NEGATIVE NEGATIVE    Urobilinogen, Urine Normal Normal    Nitrite, Urine NEGATIVE NEGATIVE    Leukocyte Esterase, Urine NEGATIVE NEGATIVE    Urinalysis Comments       Microscopic exam not performed based on chemical results unless requested in original order. COVID-19    Collection Time: 09/04/20  8:31 PM    Specimen: Other   Result Value Ref Range    SARS-CoV-2          SARS-CoV-2, Rapid DETECTED (A) Not Detected    Source . NASOPHARYNGEAL SWAB     SARS-CoV-2, PCR         Trop/Myoglobin    Collection Time: 09/04/20  8:39 PM   Result Value Ref Range    Troponin, High Sensitivity 32 (H) 0 - 14 ng/L    Troponin T NOT REPORTED <0.03 ng/mL    Troponin Interp NOT REPORTED     Myoglobin 52 25 - 58 ng/mL   Lactate, Sepsis    Collection Time: 09/04/20  8:39 PM   Result Value Ref Range    Lactic Acid, Sepsis 0.8 0.5 - 1.9 mmol/L    Lactic Acid, Sepsis, Whole Blood NOT REPORTED 0.5 - 1.9 mmol/L   CBC Auto Differential    Collection Time: 09/05/20  5:26 AM   Result Value Ref Range    WBC 2.5 (L) 3.5 - 11.3 k/uL    RBC 3.34 (L) 3.95 - 5.11 m/uL    Hemoglobin 10.8 (L) 11.9 - 15.1 g/dL    Hematocrit 33.0 (L) 36.3 - 47.1 %    MCV 98.8 82.6 - 102.9 fL    MCH 32.3 25.2 - 33.5 pg    MCHC 32.7 28.4 - 34.8 g/dL    RDW 13.6 11.8 - 14.4 %    Platelets 809 (L) 851 - 453 k/uL    MPV 10.7 8.1 - 13.5 fL    NRBC Automated 0.0 0.0 per 100 WBC    Differential Type NOT REPORTED     WBC Morphology NOT REPORTED     RBC Morphology NOT REPORTED     Platelet Estimate NOT REPORTED     Seg Neutrophils 43 36 - 65 %    Lymphocytes 37 24 - 43 %    Monocytes 19 (H) 3 - 12 %    Eosinophils % 0 (L) 1 - 4 %    Basophils 0 0 - 2 %    Immature Granulocytes 0 0 %    Segs Absolute 1.06 (L) 1.50 - 8.10 k/uL    Absolute Lymph # 0.91 (L) 1.10 - 3.70 k/uL    Absolute Mono # 0.46 0.10 - 1.20 k/uL    Absolute Eos # <0.03 0.00 - 0.44 k/uL    Basophils Absolute <0.03 0.00 - 0.20 k/uL    Absolute Immature Granulocyte <0.03 0.00 - 0.30 k/uL   Comprehensive Metabolic Panel w/ Reflex to MG    Collection Time: 09/05/20  5:26 AM   Result Value Ref Range    Glucose 83 70 - 99 mg/dL    BUN 24 (H) 8 - 23 mg/dL    CREATININE 0.87 0.50 - 0.90 mg/dL    Bun/Cre Ratio NOT

## 2020-09-06 LAB
-: NORMAL
ABSOLUTE EOS #: <0.03 K/UL (ref 0–0.44)
ABSOLUTE IMMATURE GRANULOCYTE: <0.03 K/UL (ref 0–0.3)
ABSOLUTE LYMPH #: 1.32 K/UL (ref 1.1–3.7)
ABSOLUTE MONO #: 0.42 K/UL (ref 0.1–1.2)
ALBUMIN SERPL-MCNC: 4 G/DL (ref 3.5–5.2)
ALBUMIN/GLOBULIN RATIO: 1.3 (ref 1–2.5)
ALP BLD-CCNC: 56 U/L (ref 35–104)
ALT SERPL-CCNC: 33 U/L (ref 5–33)
ANION GAP SERPL CALCULATED.3IONS-SCNC: 14 MMOL/L (ref 9–17)
AST SERPL-CCNC: 46 U/L
BASOPHILS # BLD: 0 % (ref 0–2)
BASOPHILS ABSOLUTE: <0.03 K/UL (ref 0–0.2)
BILIRUB SERPL-MCNC: 0.31 MG/DL (ref 0.3–1.2)
BUN BLDV-MCNC: 17 MG/DL (ref 8–23)
BUN/CREAT BLD: ABNORMAL (ref 9–20)
CALCIUM SERPL-MCNC: 8.7 MG/DL (ref 8.6–10.4)
CHLORIDE BLD-SCNC: 100 MMOL/L (ref 98–107)
CO2: 23 MMOL/L (ref 20–31)
CREAT SERPL-MCNC: 0.74 MG/DL (ref 0.5–0.9)
D-DIMER QUANTITATIVE: 0.73 MG/L FEU
DIFFERENTIAL TYPE: ABNORMAL
EOSINOPHILS RELATIVE PERCENT: 0 % (ref 1–4)
FIBRINOGEN: 227 MG/DL (ref 140–420)
GFR AFRICAN AMERICAN: >60 ML/MIN
GFR NON-AFRICAN AMERICAN: >60 ML/MIN
GFR SERPL CREATININE-BSD FRML MDRD: ABNORMAL ML/MIN/{1.73_M2}
GFR SERPL CREATININE-BSD FRML MDRD: ABNORMAL ML/MIN/{1.73_M2}
GLUCOSE BLD-MCNC: 91 MG/DL (ref 70–99)
HCT VFR BLD CALC: 33.2 % (ref 36.3–47.1)
HEMOGLOBIN: 10.6 G/DL (ref 11.9–15.1)
IMMATURE GRANULOCYTES: 0 %
INR BLD: 1
LYMPHOCYTES # BLD: 40 % (ref 24–43)
MCH RBC QN AUTO: 31.3 PG (ref 25.2–33.5)
MCHC RBC AUTO-ENTMCNC: 31.9 G/DL (ref 28.4–34.8)
MCV RBC AUTO: 97.9 FL (ref 82.6–102.9)
MONOCYTES # BLD: 13 % (ref 3–12)
NRBC AUTOMATED: 0 PER 100 WBC
PDW BLD-RTO: 13.2 % (ref 11.8–14.4)
PLATELET # BLD: ABNORMAL K/UL (ref 138–453)
PLATELET ESTIMATE: ABNORMAL
PLATELET, FLUORESCENCE: 103 K/UL (ref 138–453)
PLATELET, IMMATURE FRACTION: 4.6 % (ref 1.1–10.3)
PMV BLD AUTO: ABNORMAL FL (ref 8.1–13.5)
POTASSIUM SERPL-SCNC: 4.6 MMOL/L (ref 3.7–5.3)
PROTHROMBIN TIME: 10.5 SEC (ref 9–12)
RBC # BLD: 3.39 M/UL (ref 3.95–5.11)
RBC # BLD: ABNORMAL 10*6/UL
REASON FOR REJECTION: NORMAL
SEG NEUTROPHILS: 47 % (ref 36–65)
SEGMENTED NEUTROPHILS ABSOLUTE COUNT: 1.54 K/UL (ref 1.5–8.1)
SODIUM BLD-SCNC: 137 MMOL/L (ref 135–144)
TOTAL PROTEIN: 7 G/DL (ref 6.4–8.3)
WBC # BLD: 3.3 K/UL (ref 3.5–11.3)
WBC # BLD: ABNORMAL 10*3/UL
ZZ NTE CLEAN UP: ORDERED TEST: NORMAL
ZZ NTE WITH NAME CLEAN UP: SPECIMEN SOURCE: NORMAL

## 2020-09-06 PROCEDURE — 82947 ASSAY GLUCOSE BLOOD QUANT: CPT

## 2020-09-06 PROCEDURE — 80053 COMPREHEN METABOLIC PANEL: CPT

## 2020-09-06 PROCEDURE — 2580000003 HC RX 258: Performed by: INTERNAL MEDICINE

## 2020-09-06 PROCEDURE — 99233 SBSQ HOSP IP/OBS HIGH 50: CPT | Performed by: INTERNAL MEDICINE

## 2020-09-06 PROCEDURE — 2500000003 HC RX 250 WO HCPCS: Performed by: NURSE PRACTITIONER

## 2020-09-06 PROCEDURE — 2060000000 HC ICU INTERMEDIATE R&B

## 2020-09-06 PROCEDURE — 6360000002 HC RX W HCPCS: Performed by: INTERNAL MEDICINE

## 2020-09-06 PROCEDURE — 85384 FIBRINOGEN ACTIVITY: CPT

## 2020-09-06 PROCEDURE — 6370000000 HC RX 637 (ALT 250 FOR IP): Performed by: INTERNAL MEDICINE

## 2020-09-06 PROCEDURE — 6370000000 HC RX 637 (ALT 250 FOR IP): Performed by: FAMILY MEDICINE

## 2020-09-06 PROCEDURE — 99232 SBSQ HOSP IP/OBS MODERATE 35: CPT | Performed by: FAMILY MEDICINE

## 2020-09-06 PROCEDURE — 85025 COMPLETE CBC W/AUTO DIFF WBC: CPT

## 2020-09-06 PROCEDURE — 85379 FIBRIN DEGRADATION QUANT: CPT

## 2020-09-06 PROCEDURE — 85610 PROTHROMBIN TIME: CPT

## 2020-09-06 PROCEDURE — 85055 RETICULATED PLATELET ASSAY: CPT

## 2020-09-06 PROCEDURE — 36415 COLL VENOUS BLD VENIPUNCTURE: CPT

## 2020-09-06 RX ORDER — DONEPEZIL HYDROCHLORIDE 10 MG/1
10 TABLET, FILM COATED ORAL NIGHTLY
Status: DISCONTINUED | OUTPATIENT
Start: 2020-09-06 | End: 2020-09-14 | Stop reason: HOSPADM

## 2020-09-06 RX ADMIN — MULTIPLE VITAMINS W/ MINERALS TAB 1 TABLET: TAB at 12:59

## 2020-09-06 RX ADMIN — ENOXAPARIN SODIUM 30 MG: 30 INJECTION SUBCUTANEOUS at 12:58

## 2020-09-06 RX ADMIN — ENOXAPARIN SODIUM 30 MG: 30 INJECTION SUBCUTANEOUS at 20:09

## 2020-09-06 RX ADMIN — ARIPIPRAZOLE 5 MG: 5 TABLET ORAL at 12:59

## 2020-09-06 RX ADMIN — SODIUM CHLORIDE, PRESERVATIVE FREE 10 ML: 5 INJECTION INTRAVENOUS at 20:09

## 2020-09-06 RX ADMIN — VITAMIN D, TAB 1000IU (100/BT) 2000 UNITS: 25 TAB at 12:59

## 2020-09-06 RX ADMIN — DONEPEZIL HYDROCHLORIDE 10 MG: 10 TABLET, FILM COATED ORAL at 20:09

## 2020-09-06 RX ADMIN — FUROSEMIDE 20 MG: 20 TABLET ORAL at 12:58

## 2020-09-06 RX ADMIN — Medication 20 MG: at 00:46

## 2020-09-06 RX ADMIN — FAMOTIDINE 20 MG: 20 TABLET, FILM COATED ORAL at 12:59

## 2020-09-06 RX ADMIN — FAMOTIDINE 20 MG: 20 TABLET, FILM COATED ORAL at 20:09

## 2020-09-06 RX ADMIN — CITALOPRAM 20 MG: 20 TABLET, FILM COATED ORAL at 12:59

## 2020-09-06 NOTE — PROGRESS NOTES
Spoke with patient's brother to update him on the patient's condition. All questions answered. Patient's brother expressed that he thinks that the patient should be discharged to a facility and not back to home alone. Writer stated that they will update oncoming shift RN so that case management can also be made aware. Patient's bother explained that he would also like an update on the patient at [address] [place] [address] Road will pass this along in report.

## 2020-09-06 NOTE — PROGRESS NOTES
Writer went in to assess patient and noticed that she had increased lethargy, pin point pupils and was unable to follow commands. Patient did respond to painful stimuli. Dr. Carol Mckeon was called to the bedside to assess the patient. As RN was in the room with patient and doing the neuro assessment, patient was starting to wake up more and respond to commands. Pupils were reactive to light, patient was able to answer all my questions, AO x4 and follow commands. Dr. Carol Mckeon requested we call patients pharmacy to verify home medications and to verify patients prescription of Seroquel 100mg. ([facility] [phone] hours 10am-3pm). Dr. Carol Mckeon stated to let him know if patient worsens or doesn't improve and he will order a head CT. Will continue to monitor.

## 2020-09-06 NOTE — PROGRESS NOTES
[facility]  Office: [address], DO, Danielle Winston, DO, Muriel Kaiser Hayward, DO, [facility], DO, Parviz Melvin MD, Kym Guzman MD, Ant Michael MD, Caryl Branch MD, Maegan Mac MD, Danika Mcmanus MD, Lars Moncada MD, Kelly Arroyo MD, Laura Hay MD, Kylee Hays, DO, Rashid Kaur MD, Sera Chong MD, Miah Echols DO, Jayden Mcintyre MD,  Ramonita Lefort, DO, Beth Bolaños MD, Roberto Burt MD, Jacquelyne Shone, [facility], [facility], [facility], Cheryl Connors, CNP, Francis Plascencia, CNS, Gertrudis Fairbanks, CNP, Yunior Flaherty, CNP, Ayala Martinez, CNP, Dee Buchanan, CNP, Brook Georges, CNP, Orly Real PA-C, Darshan Madden, [facility], Diane Bauman, CNP, Heaven Yeung, CNP, Pal Castro, CNP, Bette Klein, CNP, Prashant Lopes, [address] [place] [address]    Progress Note    9/6/2020    5:27 PM    Name:   Mick Nieto  MRN:     [id number]     Acct:      [de-identified]   Room:   [address] [place] [address] Day:  1  Admit Date:  9/5/2020  1:19 AM    PCP:   Jackie Payan MD  Code Status:  Full Code    Subjective:     C/C: fever  Interval History Status: not changed. Patient seen and examined at bedside, noted overnight events, was hypotensive after Labetalol , also was lethargic for the staff   Still on 2 L but saturating at 100% , plan to wean off   Patient vitals, labs and all providers notes were reviewed,from overnight shift and morning updates were noted and discussed with the nurse    Brief History:     Mick Nieto is a 80 y.o. Non-/non  female who presents with No chief complaint on file. and is admitted to the hospital for the management of COVID-19 virus infection.   This is a pleasant 80 y o female  With known h/o HTN, cognitive impairment, and anxiety and osteoporosis presented to [facility] ER with concerns of fever and fatigue, patient family member are being worked up for COVID-19 and there were concerns of the patient might have COVID-19.  12 in ER came back positive, initial labs in the ER reflected creatinine of 1.08 elevated BNP at 2000 with some elevation of troponin at 32, inflammatory markers were not significantly elevated, patient initial chest x-ray was not remarkable for any acute pathology. He was transferred to our facility for further management  Currently she denies any chest pain, sob, nausea, vomiting, fever or chills   She is hypoxic . Review of Systems:     She is very sleepy this am , likely as she did not sleep all night while they were walking her up and getting her pressures up  She walk up for me and told me to let her sleep     Medications: Allergies:  No Known Allergies    Current Meds:   Scheduled Meds:    donepezil  10 mg Oral Nightly    Vitamin D  2,000 Units Oral Daily    therapeutic multivitamin-minerals  1 tablet Oral Daily    sodium chloride flush  10 mL Intravenous 2 times per day    enoxaparin  30 mg Subcutaneous BID    famotidine  20 mg Oral BID    ARIPiprazole  5 mg Oral Daily    citalopram  20 mg Oral Daily    QUEtiapine  100 mg Oral Nightly    furosemide  20 mg Oral Daily     Continuous Infusions:   PRN Meds: polyethylene glycol, sodium chloride flush, potassium chloride **OR** potassium alternative oral replacement **OR** potassium chloride, magnesium sulfate, acetaminophen **OR** acetaminophen, promethazine **OR** ondansetron, nicotine, senna    Data:     Past Medical History:   has a past medical history of Anemia, Anxiety, Naranjo disease, Bradycardia, Cataracts, bilateral, Colon polyp, Diverticulosis, Fibroid, Forgetfulness, Gait disturbance, Gall stones, Headache(784.0), Hearing loss, HTN (hypertension), Insomnia, Lichen sclerosus, Osteoporosis, Other activity(E029.9), Pap smear for cervical cancer screening, Pneumonia, Vaginal atrophy, and Vaginal discharge. Social History:   reports that she quit smoking about 46 years ago.  She has never used smokeless tobacco. She reports current alcohol use. She reports that she does not use drugs. Family History:   Family History   Problem Relation Age of Onset    Colon Cancer Brother             Diabetes Father     Bipolar Disorder Mother     Osteoporosis Mother    Serenity Seay Migraines Mother        Vitals:  /60   Pulse 53   Temp 97.5 °F (36.4 °C) (Axillary)   Resp 12   Ht 5' 9\" (1.753 m)   Wt 154 lb (69.9 kg)   SpO2 98%   BMI 22.74 kg/m²   Temp (24hrs), Av.3 °F (36.8 °C), Min:97.5 °F (36.4 °C), Max:99.4 °F (37.4 °C)    No results for input(s): POCGLU in the last 72 hours. I/O (24Hr):     Intake/Output Summary (Last 24 hours) at 2020 1727  Last data filed at 2020 0400  Gross per 24 hour   Intake --   Output 200 ml   Net -200 ml       Labs:  Hematology:  Recent Labs     20  1212   WBC 3.0* 2.5*  --    RBC 3.52* 3.34*  --    HGB 10.9* 10.8*  --    HCT 34.3* 33.0*  --    MCV 97.4 98.8  --    MCH 31.0 32.3  --    MCHC 31.8 32.7  --    RDW 13.4 13.6  --    * 117*  --    MPV 9.9 10.7  --    CRP 3.1  --   --    DDIMER  --   --  1.15     Chemistry:  Recent Labs     20  1402     --  139 137   K 4.5  --  4.6 4.6     --  107 100   CO2 27  --  24 23   GLUCOSE 135*  --  83 91   BUN 31*  --  24* 17   CREATININE 1.08*  --  0.87 0.74   ANIONGAP 9  --  8* 14   LABGLOM 48*  --  >60 >60   GFRAA 58*  --  >60 >60   CALCIUM 8.9  --  7.7* 8.7   PROBNP 2,196*  --   --   --    TROPHS 33* 32*  --   --    MYOGLOBIN 62* 52  --   --      Recent Labs     20  1830 20  0526 20  1402   PROT 6.1* 5.5* 7.0   LABALBU 4.0 3.5 4.0   AST 36* 34* 46*   ALT 30 27 33     --   --    ALKPHOS 49 43 56   BILITOT 0.38 0.28* 0.31     ABG:No results found for: POCPH, PHART, PH, POCPCO2, OQM2IMH, PCO2, POCPO2, PO2ART, PO2, POCHCO3, ZED2JFF, HCO3, NBEA, PBEA, BEART, BE, THGBART, THB, THU3EBN, DRSH5HUI, Z3GIICKT, O2SAT, FIO2  Lab Results   Component Value Date/Time    SPECIAL NOT REPORTED 09/04/2020 08:31 PM     Lab Results   Component Value Date/Time    CULTURE NO GROWTH 09/04/2020 08:31 PM       Radiology:  Xr Chest Portable    Result Date: 9/4/2020  No acute pulmonary disease. Calcific atherosclerotic disease aorta. Physical Examination:        General appearance: Sleepy but arousable, cooperative and no distress  Mental Status:  oriented to person, place and time and normal affect  Lungs:  clear to auscultation bilaterally, normal effort  Heart:  regular rate and rhythm, no murmur  Abdomen:  soft, nontender, nondistended, normal bowel sounds, no masses, hepatomegaly, splenomegaly  Extremities:  no edema, redness, tenderness in the calves  Skin:  no gross lesions, rashes, induration    Assessment:        Hospital Problems           Last Modified POA    * (Principal) COVID-19 virus infection 9/4/2020 Yes    Essential hypertension 9/4/2020 Yes    Thrombocytopenia ([address] 9/4/2020 Yes    Elevated troponin 9/5/2020 Yes    Elevated brain natriuretic peptide (BNP) level 9/5/2020 Yes          Plan:        COVID-19 infection: Droplet plus plus isolation precautions     Hypoxia: Supplemental oxygen as needed, titrated down and now on room air     Episode of hypotension after getting labetalol: This morning the patient blood pressure is okay avoid any antihypertensives patient is not taking any at home. Episode of confusion: Currently resolved, might be related to her medications     Elevated troponin/elevated BNP: We will get echocardiogram, gentle diuresis      Essential hypertension: Continue home medication.     Dementia/cognitive impairment: Continue chronic medication     Thrombocytopenia monitor closely: Okay for DVT prophylaxis for now     Discussed with the patient and the nurse.     If not a candidate for any therapy plan to discharge likely in the next 24 hours    Discussed with the patient and with the

## 2020-09-06 NOTE — SIGNIFICANT EVENT
Called regarding decreased LOC and pin point pupils. Patient sleeping but arousable, oriented x3 and moving all 4 with equal strength, no pronator drift. Received 100 mg seroquel at bedtime as listed on home medications. Will have staff verify home meds when pharmacy opens. Patient was improving with continued stimulation by RN at bedside, will monitor progress  And consider CT brain if fails to return to baseline as medication clears or if worsening.

## 2020-09-06 NOTE — CONSULTS
[facility] -. Progress Note  COVID 19 Patient  Today's Date and Time: 9/6/2020, 9:48 AM    Impression :   · COVID 19 Suspect  · COVID 19 Confirmed Infection   · COVID-19 tests:  · 9/4/2020 positive  · Congestive heart failure    Recommendations:   · Monitor off antibiotics  · Clinical Research will approach patient to explore if he qualifies for any of the COVID 19 treatment protocols. At the current stage she does not seem to qualify for any therapy  · Remdesivir. Patient does not qualify as she is not requiring any oxygen support  · Suggest gentle diuresis      Medical Decision Making/Summary/Discussion:9/6/2020     · Patient admitted with suspected COVID 19 infection  · Covid test confirmed positive. · She has associated congestive heart failure  · Currently no signs of pneumoniae associated with the COVID  Infection Control Recommendations   · Universal Precautions  · Airborne isolation  · Droplet Isolation    Antimicrobial Stewardship Recommendations     · Discontinuation of therapy  Coordination of Outpatient Care:   · Estimated Length of IV antimicrobials:TBD  · Patient will need Midline Catheter Insertion: TBD  · Patient will need PICC line Insertion: No  · Patient will need: Home IV , [place],  SNF,  LTAC:TBD  · Patient will need outpatient wound care:No    Chief complaint/reason for consultation:   · Concern for COVID infection      History of Present Illness:   Anne Chow is a 80y.o.-year-old  female who was initially admitted on 9/5/2020. Patient seen at the request of Ricardo Mena. INITIAL HISTORY:    Patient presented through ER with complaints of fatigue, generalized weakness and fevers. The patient denied headache, cough, shortness of breath, expectoration of secretions. She also denied GI symptoms such as abdominal pain nausea vomiting or diarrhea.     The patient presented because she was fearful that her daughter may have COVID and she may have become exposed. The patient lives at her home but is helped by her family because of her age. Remains ambulatory with the help of a walker    In the emergency room her chest x-ray on 9/4/2020 was normal.  It showed atherosclerosis of the aorta but no pulmonary problems other than evidence of old granulomatous disease. White count was 3.0 with a hemoglobin of 10.9 and platelets of 946. Troponin and myoglobin were elevated. Her ferritin was elevated at 283. Her proBNP was elevated at 2186  Her COVID-19 test was positive on 9/4/2020. Patient admitted because of concerns with COVID 19.    CURRENT EVALUATION : 9/6/2020    Patient evaluated and examined in the ICU. Afebrile  VS stable    Patient exhibiting respiratory distress. No  Respiratory secretions: No    Patient receiving supplemental oxygen. 3 L nasal cannula  RR 14  02 sat 97-->100 %      QTc:           NEWS Score: 0-4 Low risk group; 5-6: Medium risk group; 7 or above: High risk group  Parameters 3 2 1 0 1 2 3   Age    < 65   ? 65   RR ? 8  9-11 12-20  21-24 ? 25   O2 Sats ?  91 92-93 94-95 ? 96      Suppl O2  Yes  No      SBP ? 90  101-110 111-219   ? 220   HR ? 40  41-50 51-90  111-130 ? 131   Consciousness    Alert   Drowsiness, lethargy, or confusion   Temperature ? 35.0 C (95.0 F)  35.1-36.0 C 95.1-96.9 F 36.1-38.0 C 97.0-100.4 F 38.1-39.0 C 100.5-102.3 F ? 39.1 C ? 102.4 F      NEWS Score:   9/5/2020: 3 low risk    Overall Daily Picture:      Unchanged    Presence of secondary bacterial Infection:  No   Additional antibiotics: No    Labs, X rays reviewed: 9/6/2020    BUN: 24  Cr: 0.87    WBC: 3.0--> 2.5  Hb: 10.8  Plat: 117    Absolute Neutrophils: 1.06  Absolute Lymphocytes: 0.9  Neutrophil/Lymphocyte Ratio: 1.17 low risk    CRP: 3.1  Ferritin: 283  LDH: 185    Pro Calcitonin:  proBNP 2186    Cultures:  Urine:  ·   Blood:  ·   Sputum :  ·   Wound:       CXR:   · 9/4/2020 clear lung fields  CAT:      Discussed with patient, RN, CC, IM. I have personally reviewed the past medical history, past surgical history, medications, social history, and family history, and I have updated the database accordingly. 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 fistulogram negative       Past Surgical  History:     Past Surgical History:   Procedure Laterality Date    BLEPHAROPLASTY      BREAST BIOPSY      CHOLECYSTECTOMY      HYSTERECTOMY      TAVO due to fibroids    JOINT REPLACEMENT  2011    KIDNEY CYST REMOVAL      OTHER SURGICAL HISTORY      thyroid nodule removed    VULVAR/PERINEAL BIOPSY         Medications:      donepezil  10 mg Oral Nightly    Vitamin D  2,000 Units Oral Daily    therapeutic multivitamin-minerals  1 tablet Oral Daily    sodium chloride flush  10 mL Intravenous 2 times per day    enoxaparin  30 mg Subcutaneous BID    famotidine  20 mg Oral BID    ARIPiprazole  5 mg Oral Daily    citalopram  20 mg Oral Daily    QUEtiapine  100 mg Oral Nightly    furosemide  20 mg Oral Daily       Social History:     Social History     Socioeconomic History    Marital status:       Spouse name: Not on file    Number of children: Not on file    Years of education: Not on file    Highest education level: Not on file   Occupational History    Not on file   Social cramps. Genitourinary: No increased urinary frequency, or dysuria. No hematuria. No suprapubic or CVA pain  Musculoskeletal: No muscle aches or pains. No joint effusions, swelling or deformities  Hematologic: No bleeding or bruising. Neurologic: No headache, weakness, numbness, or tingling. Integument: No rash, no ulcers. Psychiatric: No depression. Endocrine: No polyuria, no polydipsia, no polyphagia. Physical Examination :     Patient Vitals for the past 8 hrs:   BP Temp Temp src Pulse Resp SpO2   09/06/20 0930 (!) 139/58 97.5 °F (36.4 °C) Axillary (!) 48 14 --   09/06/20 0430 97/62 -- -- 57 21 98 %   09/06/20 0400 -- -- -- 57 -- --   09/06/20 0311 (!) 85/43 -- -- (!) 46 17 99 %   09/06/20 0230 (!) 100/48 -- -- 53 14 99 %     General Appearance: Awake, alert, and in no apparent distress. Elderly woman  Head:  Normocephalic, no trauma  Eyes: Pupils equal, round, reactive to light and accommodation; extraocular movements intact; sclera anicteric; conjunctivae pink. No embolic phenomena. ENT: Oropharynx clear, without erythema, exudate, or thrush. No tenderness of sinuses. Mouth/throat: mucosa pink and moist. No lesions. Dentition in good repair. Neck:Supple, without lymphadenopathy. Thyroid normal, No bruits. Pulmonary/Chest: Clear to auscultation, without wheezes, rales, or rhonchi. Cardiovascular: Regular rate and rhythm without murmurs, rubs, or gallops. Abdomen: Soft, non tender. Bowel sounds normal. No organomegaly  All four Extremities: No cyanosis, clubbing, edema, or effusions. Neurologic: No gross sensory or motor deficits. Skin: Warm and dry with good turgor. Signs of peripheral arterial insufficiency. No ulcerations. No open wounds.     Medical Decision Making -Laboratory:   I have independently reviewed/ordered the following labs:    CBC with Differential:   Recent Labs     09/04/20  1830 09/05/20  0526   WBC 3.0* 2.5*   HGB 10.9* 10.8*   HCT 34.3* 33.0*   * 117*   LYMPHOPCT 27 37   MONOPCT 14* 19*     BMP:   Recent Labs     09/04/20 1830 09/05/20  0526    139   K 4.5 4.6    107   CO2 27 24   BUN 31* 24*   CREATININE 1.08* 0.87     Hepatic Function Panel:   Recent Labs     09/04/20 1830 09/05/20  0526   PROT 6.1* 5.5*   LABALBU 4.0 3.5   BILITOT 0.38 0.28*   ALKPHOS 49 43   ALT 30 27   AST 36* 34*     No results for input(s): RPR in the last 72 hours. No results for input(s): HIV in the last 72 hours. No results for input(s): BC in the last 72 hours. Lab Results   Component Value Date    MUCUS NOT REPORTED 04/12/2018    RBC 3.34 09/05/2020    TRICHOMONAS NOT REPORTED 04/12/2018    WBC 2.5 09/05/2020    YEAST NOT REPORTED 04/12/2018    TURBIDITY CLEAR 09/04/2020     Lab Results   Component Value Date    CREATININE 0.87 09/05/2020    GLUCOSE 83 09/05/2020       Medical Decision Making-Imaging:     EXAMINATION:    ONE XRAY VIEW OF THE CHEST         9/4/2020 6:48 pm         COMPARISON:    Chest 04/16/2016         HISTORY:    ORDERING SYSTEM PROVIDED HISTORY: SOB    TECHNOLOGIST PROVIDED HISTORY:    SOB    Reason for Exam: Fatigue, fever    Acuity: Acute    Type of Exam: Initial         FINDINGS:    Calcifications involving the aorta reflect atherosclerosis. The    cardiomediastinal and hilar silhouettes appear otherwise unremarkable. Sequela from old granulomatous disease.  No focal infiltrate or pleural    effusion evident.  No pneumothorax is seen.  No acute osseous abnormality is    identified.              Impression    No acute pulmonary disease.         Calcific atherosclerotic disease aorta.               Medical Decision [id number]:       Medical Decision Making-Other:     Note:  · Labs, medications, radiologic studies were reviewed with personal review of films  · Large amounts of data were reviewed  · Discussed with nursing Staff, Discharge planner  · Infection Control and Prevention measures reviewed  · All prior entries were reviewed  · Administer medications as ordered  · Prognosis: Guarded  · Discharge planning reviewed  · Follow up as outpatient. Thank you for allowing us to participate in the care of this patient. Please call with questions.     Franklyn Stringer MD  Pager: [phone] - Office: [phone]

## 2020-09-06 NOTE — CARE COORDINATION
Case Management Initial Discharge Plan  Re Villafanajeramy,         +covid  Patient sleeping called daughter miah to verify information       Information verified: address, contacts, phone number, , insurance Yes    Emergency Contact/Next of Kin name & number: daughter miah     PCP: Radha Hughes MD  Date of last visit: week ago    Insurance Provider: medicare/humana     Discharge Planning    Living Arrangements:  [place]:  Family Members, Children    Home has 1 stories  1 stairs to climb to get into front door,    Patient able to perform ADL's:Assisted    Current Services (outpatient & in home) was being evaluated for home care doesn't know company name  DME equipment: rollator   Pharmacy: [facility]      Receiving oral anticoagulation therapy? No          Potential Assistance Needed:  snf daughter said she is very weak and she's sick and cannot help her mother      Prior SNF/Rehab Placement and Facility: [facility]  Agreeable to SNF/Rehab: discussed options available wants mom to be included in decision        Evaluation: no    Expected Discharge date:  20    Patient expects to be discharged to:  tbd  Follow Up Appointment: Best Day/ Time: Monday AM    Transportation provider: [facility] snf      Readmission Risk              Risk of Unplanned Readmission:        13             Does patient have a readmission risk score greater than 14?: No  If yes, follow-up appointment must be made within 7 days of discharge.      Goals of Care: regain strength to complete adl's      Discharge Plan: likely snf           Electronically signed by Lona Crabtree RN on 20 at 6:19 PM EDT

## 2020-09-07 PROCEDURE — 6360000002 HC RX W HCPCS: Performed by: INTERNAL MEDICINE

## 2020-09-07 PROCEDURE — 6370000000 HC RX 637 (ALT 250 FOR IP): Performed by: INTERNAL MEDICINE

## 2020-09-07 PROCEDURE — 97535 SELF CARE MNGMENT TRAINING: CPT

## 2020-09-07 PROCEDURE — 97162 PT EVAL MOD COMPLEX 30 MIN: CPT

## 2020-09-07 PROCEDURE — 97166 OT EVAL MOD COMPLEX 45 MIN: CPT

## 2020-09-07 PROCEDURE — 99232 SBSQ HOSP IP/OBS MODERATE 35: CPT | Performed by: FAMILY MEDICINE

## 2020-09-07 PROCEDURE — 2060000000 HC ICU INTERMEDIATE R&B

## 2020-09-07 PROCEDURE — 6370000000 HC RX 637 (ALT 250 FOR IP): Performed by: FAMILY MEDICINE

## 2020-09-07 PROCEDURE — 99233 SBSQ HOSP IP/OBS HIGH 50: CPT | Performed by: INTERNAL MEDICINE

## 2020-09-07 PROCEDURE — 97530 THERAPEUTIC ACTIVITIES: CPT

## 2020-09-07 PROCEDURE — 2580000003 HC RX 258: Performed by: INTERNAL MEDICINE

## 2020-09-07 RX ORDER — MIDODRINE HYDROCHLORIDE 5 MG/1
5 TABLET ORAL 3 TIMES DAILY PRN
Status: DISCONTINUED | OUTPATIENT
Start: 2020-09-07 | End: 2020-09-08

## 2020-09-07 RX ADMIN — VITAMIN D, TAB 1000IU (100/BT) 2000 UNITS: 25 TAB at 08:55

## 2020-09-07 RX ADMIN — ARIPIPRAZOLE 5 MG: 5 TABLET ORAL at 08:56

## 2020-09-07 RX ADMIN — MULTIPLE VITAMINS W/ MINERALS TAB 1 TABLET: TAB at 08:55

## 2020-09-07 RX ADMIN — ACETAMINOPHEN 650 MG: 325 TABLET ORAL at 21:07

## 2020-09-07 RX ADMIN — SODIUM CHLORIDE, PRESERVATIVE FREE 10 ML: 5 INJECTION INTRAVENOUS at 08:56

## 2020-09-07 RX ADMIN — SODIUM CHLORIDE, PRESERVATIVE FREE 10 ML: 5 INJECTION INTRAVENOUS at 20:50

## 2020-09-07 RX ADMIN — ENOXAPARIN SODIUM 30 MG: 30 INJECTION SUBCUTANEOUS at 20:50

## 2020-09-07 RX ADMIN — FAMOTIDINE 20 MG: 20 TABLET, FILM COATED ORAL at 20:50

## 2020-09-07 RX ADMIN — ENOXAPARIN SODIUM 30 MG: 30 INJECTION SUBCUTANEOUS at 08:56

## 2020-09-07 RX ADMIN — SENNOSIDES 8.6 MG: 8.6 TABLET, FILM COATED ORAL at 08:55

## 2020-09-07 RX ADMIN — DONEPEZIL HYDROCHLORIDE 10 MG: 10 TABLET, FILM COATED ORAL at 20:50

## 2020-09-07 RX ADMIN — CITALOPRAM 20 MG: 20 TABLET, FILM COATED ORAL at 08:55

## 2020-09-07 RX ADMIN — FAMOTIDINE 20 MG: 20 TABLET, FILM COATED ORAL at 08:55

## 2020-09-07 RX ADMIN — ACETAMINOPHEN 650 MG: 325 TABLET ORAL at 00:49

## 2020-09-07 ASSESSMENT — ENCOUNTER SYMPTOMS
DIARRHEA: 0
WHEEZING: 0
NAUSEA: 0
CHEST TIGHTNESS: 0
COUGH: 0
SHORTNESS OF BREATH: 0
BLOOD IN STOOL: 0
VOMITING: 0
ABDOMINAL PAIN: 0
CONSTIPATION: 0

## 2020-09-07 ASSESSMENT — PAIN SCALES - GENERAL
PAINLEVEL_OUTOF10: 2
PAINLEVEL_OUTOF10: 2

## 2020-09-07 ASSESSMENT — PAIN DESCRIPTION - PAIN TYPE: TYPE: ACUTE PAIN

## 2020-09-07 ASSESSMENT — PAIN DESCRIPTION - LOCATION: LOCATION: GENERALIZED

## 2020-09-07 NOTE — PLAN OF CARE
Problem: Gas Exchange - Impaired  Goal: Absence of hypoxia  9/7/2020 1530 by Jorge Dangelo RN  Outcome: Ongoing  9/7/2020 0155 by Maria T Olmos RN  Outcome: Ongoing  Goal: Promote optimal lung function  9/7/2020 1530 by Jorge Dangelo RN  Outcome: Ongoing  9/7/2020 0155 by Maria T Olmos RN  Outcome: Ongoing     Problem:  Body Temperature -  Risk of, Imbalanced  Goal: Ability to maintain a body temperature within defined limits  9/7/2020 1530 by Jorge Dangelo RN  Outcome: Ongoing  9/7/2020 0155 by Maria T Olmos RN  Outcome: Ongoing  Goal: Will regain or maintain usual level of consciousness  9/7/2020 1530 by Jorge Dangelo RN  Outcome: Ongoing  9/7/2020 0155 by Maria T Olmos RN  Outcome: Ongoing  Goal: Complications related to the disease process, condition or treatment will be avoided or minimized  9/7/2020 1530 by Jorge Dangelo RN  Outcome: Ongoing  9/7/2020 0155 by Maria T Olmos RN  Outcome: Ongoing     Problem: Isolation Precautions - Risk of Spread of Infection  Goal: Prevent transmission of infection  9/7/2020 1530 by Jorge Dangelo RN  Outcome: Ongoing  9/7/2020 0155 by Maria T Olmos RN  Outcome: Ongoing     Problem: Loneliness or Risk for Loneliness  Goal: Demonstrate positive use of time alone when socialization is not possible  9/7/2020 1530 by Jorge Dangelo RN  Outcome: Ongoing  9/7/2020 0155 by Maria T Olmos RN  Outcome: Ongoing     Problem: Fatigue  Goal: Verbalize increase energy and improved vitality  9/7/2020 1530 by Jorge Dangelo RN  Outcome: Ongoing  9/7/2020 0155 by Maria T Olmos RN  Outcome: Ongoing     Problem: Falls - Risk of:  Goal: Will remain free from falls  Description: Will remain free from falls  9/7/2020 1530 by Jorge Dangelo RN  Outcome: Ongoing  9/7/2020 0155 by Maria T Olmos RN  Outcome: Ongoing  Goal: Absence of physical injury  Description: Absence of physical injury  9/7/2020 1530 by Dada Garza RN  Outcome: Ongoing  9/7/2020 0155 by Hero Mcdonald RN  Outcome: Ongoing

## 2020-09-07 NOTE — PROGRESS NOTES
Occupational Therapy   Occupational Therapy Initial Assessment  Date: 2020   Patient Name: Siva Whitaker  MRN: [id number]     : 10/28/1932    Date of Service: 2020    Discharge Recommendations:  Patient would benefit from continued therapy after discharge       Assessment   Performance deficits / Impairments: Decreased functional mobility ; Decreased ADL status; Decreased safe awareness;Decreased endurance;Decreased balance;Decreased high-level IADLs  Assessment: Pt agreeable to OT eval this date. Pt completed functional transfers/mobility with RW and CGA throughout. Pt demo decreased endurance and fatigues easily. Pt demo Min A for majority of ADLs at this time. Pt to benefit from continued OT services to address deficits listed. Pt would be unsafe to return to prior living arrangements at this time d/t  assistance required at this time  Prognosis: Good  Decision Making: Medium Complexity  Patient Education: Pt educated on OT role, OT POC, safety awareness, and importance of continued therapy. Pt demo good understanding  REQUIRES OT FOLLOW UP: Yes  Activity Tolerance  Activity Tolerance: Patient Tolerated treatment well  Safety Devices  Safety Devices in place: Yes  Type of devices: Nurse notified; Left in chair;Gait belt;Call light within reach  Restraints  Initially in place: No           Patient Diagnosis(es): There were no encounter diagnoses. has a past medical history of Anemia, Anxiety, Naranjo disease, Bradycardia, Cataracts, bilateral, Colon polyp, Diverticulosis, Fibroid, Forgetfulness, Gait disturbance, Gall stones, Headache(784.0), Hearing loss, HTN (hypertension), Insomnia, Lichen sclerosus, Osteoporosis, Other activity(E029.9), Pap smear for cervical cancer screening, Pneumonia, Vaginal atrophy, and Vaginal discharge. has a past surgical history that includes vulvar/perineal biopsy; Cholecystectomy; blepharoplasty; other surgical history;  Hysterectomy; Breast biopsy; joint replacement (2011); and Kidney cyst removal.           Restrictions  Restrictions/Precautions  Restrictions/Precautions: Fall Risk, Isolation(COVID + / Droplet +)  Required Braces or Orthoses?: No  Position Activity Restriction  Other position/activity restrictions: up with assist    Subjective   General  Patient assessed for rehabilitation services?: Yes  Family / Caregiver Present: No  General Comment  Comments: RN ok'd pt for OT/PT eval this date. Pt agreeable to eval and pleasant/cooperative throughout  Patient Currently in Pain: Denies  Vital Signs  Patient Currently in Pain: Denies  Oxygen Therapy  O2 Device: None (Room air)     Social/Functional History  Social/Functional History  Lives With: Alone  Type of Home: House  Home Layout: One level  Home Access: Stairs to enter without rails  Entrance Stairs - Number of Steps: 1(able to hold L wall)  Bathroom Shower/Tub: Walk-in shower  Bathroom Toilet: Standard  Bathroom Equipment: Grab bars in shower  Bathroom Accessibility: Accessible  Home Equipment: (rollator, uses prn. home health looking for better shower chair)  Receives Help From: Home health([facility]: RN, PT, OT just starting. 2x/ wk. just started)  Homemaking Assistance: ( cleans, does some launder  does other. 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 [employer]       Objective   Vision: Impaired  Vision Exceptions: Wears glasses at all times  Hearing: Exceptions to [facility]  Hearing Exceptions: Bilateral hearing aid         Balance  Sitting Balance: Stand by assistance  Standing Balance: Contact guard assistance  Functional Mobility  Functional - Mobility Device: Rolling Walker  Activity: Other; To/from bathroom  Assist Level: Contact guard assistance  Functional Mobility Comments: Pt demo CGA with use of RW for functional mobility Short term goals: By discharge, pt will:  Short term goal 1: Demo Mod I with use of LRD for functional transfers and functional mobility  Short term goal 2: Demo I with setup for UB ADLs and grooming tasks  Short term goal 3: Demo I with setup for LB ADLs and toileting tasks  Short term goal 4: Demo 8+ minutes dynamic standing to increase safety and independence with ADLs/IADLs  Short term goal 5: Demo I with V/D 2+ EC/WS techniques to incorporate into daily routine       Therapy Time   Individual Concurrent Group Co-treatment   Time In 1001         Time Out 1027         Minutes 26         Timed Code Treatment Minutes: 11 Minutes       Merlin Kroner, OTR/L

## 2020-09-07 NOTE — PROGRESS NOTES
[facility]  Office: [address], DO, Ryanboris Julio, DO, Caroline Prajapati, DO, Argenis Davison, DO, Donovan Pineda MD, Flower Almazan MD, Hanane Diaz MD, Arcadio Higgins MD, Davey Dean MD, Diaz Fairbanks MD, Jason Rodríguez MD, Herb Ritter MD, Laura Webber Asa, MD, Kevin Knowles, DO, Beryl Martinez MD, Philomena Connor MD, Katherine Londono DO, Miguel Angel Monroe MD,  Veronica Borges DO, Mary Torres MD, Jefe Hendrickson MD, Lilibeth Nevarez [facility], [facility], CNP, Breonna Connors, CNP, Kathryn Rodriguez, CNS, Addie Eller, CNP, Pedro Lizama, CNP, Shaq Burch, CNP, Kevin Mon, CNP, Sveta Freeman, CNP, Ericka Diaz PA-C, Gómez Russo, MARTIN, Deepak Chapman, CNP, Jesusita Schilling, CNP, Basilia Rollins, CNP, Kiel Phillips, CNP, Lacey German, [address]   [address] [place] [address]    Progress Note    9/7/2020    2:21 PM    Name:   Char Olmos  MRN:     [id number]     Acct:      [de-identified]   Room:   [address] [place] [address] Day:  2  Admit Date:  9/5/2020  1:19 AM    PCP:   Low Seay MD  Code Status:  Full Code    Subjective:     C/C: fever  Interval History Status: not changed. Patient seen and examined at bedside, Looks much better today ,sitting in the chair and chatting , worked with PT, a bit week   Likely needs rehab  Seroquel was held yesterday   Patient vitals, labs and all providers notes were reviewed,from overnight shift and morning updates were noted and discussed with the nurse    Brief History:     Char Olmos is a 80 y.o. Non-/non  female who presents with No chief complaint on file. and is admitted to the hospital for the management of COVID-19 virus infection.   This is a pleasant 80 y o female  With known h/o HTN, cognitive impairment, and anxiety and osteoporosis presented to General acute hospital ER with concerns of fever and fatigue, patient family member are being worked up for COVID-19 and there were concerns of the patient might have COVID-19.  12 in ER came back positive, initial labs in the ER reflected creatinine of 1.08 elevated BNP at 2000 with some elevation of troponin at 32, inflammatory markers were not significantly elevated, patient initial chest x-ray was not remarkable for any acute pathology. He was transferred to our facility for further management  Currently she denies any chest pain, sob, nausea, vomiting, fever or chills   She is hypoxic . Review of Systems:     Review of Systems   Constitutional: Negative for chills, diaphoresis and fever. HENT: Negative for congestion. Eyes: Negative for visual disturbance. Respiratory: Negative for cough, chest tightness, shortness of breath and wheezing. Cardiovascular: Negative for chest pain, palpitations and leg swelling. Gastrointestinal: Negative for abdominal pain, blood in stool, constipation, diarrhea, nausea and vomiting. Genitourinary: Negative for difficulty urinating. Neurological: Negative for dizziness, weakness, light-headedness, numbness and headaches. All other systems reviewed and are negative. Medications:      Allergies:  No Known Allergies    Current Meds:   Scheduled Meds:    donepezil  10 mg Oral Nightly    Vitamin D  2,000 Units Oral Daily    therapeutic multivitamin-minerals  1 tablet Oral Daily    sodium chloride flush  10 mL Intravenous 2 times per day    enoxaparin  30 mg Subcutaneous BID    famotidine  20 mg Oral BID    ARIPiprazole  5 mg Oral Daily    citalopram  20 mg Oral Daily    [Held by provider] QUEtiapine  100 mg Oral Nightly    [Held by provider] furosemide  20 mg Oral Daily     Continuous Infusions:   PRN Meds: midodrine, polyethylene glycol, sodium chloride flush, potassium chloride **OR** potassium alternative oral replacement **OR** potassium chloride, magnesium sulfate, acetaminophen **OR** acetaminophen, promethazine **OR** ondansetron, nicotine, senna    Data:     Past Medical History:   has a past medical history of Anemia, Anxiety, Naranjo disease, Bradycardia, Cataracts, bilateral, Colon polyp, Diverticulosis, Fibroid, Forgetfulness, Gait disturbance, Gall stones, Headache(784.0), Hearing loss, HTN (hypertension), Insomnia, Lichen sclerosus, Osteoporosis, Other activity(E029.9), Pap smear for cervical cancer screening, Pneumonia, Vaginal atrophy, and Vaginal discharge. Social History:   reports that she quit smoking about 46 years ago. She has never used smokeless tobacco. She reports current alcohol use. She reports that she does not use drugs. Family History:   Family History   Problem Relation Age of Onset    Colon Cancer Brother             Diabetes Father     Bipolar Disorder Mother     Osteoporosis Mother    [facility] Migraines Mother        Vitals:  BP (!) 106/58   Pulse 55   Temp 97.9 °F (36.6 °C) (Oral)   Resp 18   Ht 5' 9\" (1.753 m)   Wt 154 lb (69.9 kg)   SpO2 90%   BMI 22.74 kg/m²   Temp (24hrs), Av.9 °F (36.6 °C), Min:97.6 °F (36.4 °C), Max:98.2 °F (36.8 °C)    No results for input(s): POCGLU in the last 72 hours. I/O (24Hr):   No intake or output data in the 24 hours ending 20 1421    Labs:  Hematology:  Recent Labs     20  1830 20  0526 20  1212 20  2058   WBC 3.0* 2.5*  --  3.3*   RBC 3.52* 3.34*  --  3.39*   HGB 10.9* 10.8*  --  10.6*   HCT 34.3* 33.0*  --  33.2*   MCV 97.4 98.8  --  97.9   MCH 31.0 32.3  --  31.3   MCHC 31.8 32.7  --  31.9   RDW 13.4 13.6  --  13.2   * 117*  --  See Reflexed IPF Result   MPV 9.9 10.7  --  NOT REPORTED   CRP 3.1  --   --   --    INR  --   --   --  1.0   DDIMER  --   --  1.15 0.73     Chemistry:  Recent Labs     20  1830 20  0520  1402     --  139 137   K 4.5  --  4.6 4.6     --  107 100   CO2 27  --  24 23   GLUCOSE 135*  --  83 91   BUN 31*  --  24* 17   CREATININE 1.08*  --  0.87 0.74   ANIONGAP 9  --  8* 14   LABGLOM 48*  --  >60 >60   GFRAA 58*  --  >60 >60   CALCIUM 8.9  --  7.7* 8.7   PROBNP 2,196*  --   --   --    TROPHS 33* 32*  --   --    MYOGLOBIN 62* 52  --   --      Recent Labs     09/04/20  1830 09/05/20  0526 09/06/20  1402   PROT 6.1* 5.5* 7.0   LABALBU 4.0 3.5 4.0   AST 36* 34* 46*   ALT 30 27 33     --   --    ALKPHOS 49 43 56   BILITOT 0.38 0.28* 0.31     ABG:No results found for: POCPH, PHART, PH, POCPCO2, ZCS3AXM, PCO2, POCPO2, PO2ART, PO2, POCHCO3, SRB0BUB, HCO3, NBEA, PBEA, BEART, BE, THGBART, THB, WBY4IZS, HHRQ9MNR, U6UGIEXN, O2SAT, FIO2  Lab Results   Component Value Date/Time    SPECIAL NOT REPORTED 09/04/2020 08:31 PM     Lab Results   Component Value Date/Time    CULTURE NO GROWTH 09/04/2020 08:31 PM       Radiology:  Xr Chest Portable    Result Date: 9/4/2020  No acute pulmonary disease. Calcific atherosclerotic disease aorta.        Physical Examination:        General appearance: Sleepy but arousable, cooperative and no distress  Mental Status:  oriented to person, place and time and normal affect  Lungs:  clear to auscultation bilaterally, normal effort  Heart:  regular rate and rhythm, no murmur  Abdomen:  soft, nontender, nondistended, normal bowel sounds, no masses, hepatomegaly, splenomegaly  Extremities:  no edema, redness, tenderness in the calves  Skin:  no gross lesions, rashes, induration    Assessment:        Hospital Problems           Last Modified POA    * (Principal) COVID-19 virus infection 9/4/2020 Yes    Essential hypertension 9/4/2020 Yes    Thrombocytopenia ([address] 9/4/2020 Yes    Elevated troponin 9/5/2020 Yes    Elevated brain natriuretic peptide (BNP) level 9/5/2020 Yes          Plan:        COVID-19 infection: Droplet plus plus isolation precautions     Hypoxia: resolved    Episode of hypotension after getting labetalol: resolved    Episode of confusion: Currently resolved, might be related to her medications     Elevated troponin/elevated BNP: We will get echocardiogram, gentle diuresis    Essential hypertension: Continue home medication.     Dementia/cognitive impairment: Continue chronic medication     Thrombocytopenia monitor closely: Okay for DVT prophylaxis for now     Discussed with the patient and the nurse.     [facility] the patient [facility]  [facility]       Lilian Juarez MD  9/7/2020  2:21 PM

## 2020-09-07 NOTE — PROGRESS NOTES
Paged on call NP regarding patient's blood pressure of 83/49 map 60. Heart rate 68. Patient is asymptomatic, denies lightheadedness, dizziness, or chest pain. Writer was advised to recheck in 30 min and to notify if the patient gets any lower. Patients blood pressure came up to 90/60. Will continue to monitor.

## 2020-09-07 NOTE — PROGRESS NOTES
3.1  Ferritin: 283  LDH: 185    Pro Calcitonin:  proBNP 2186    Cultures:  Urine:  ·   Blood:  ·   Sputum :  ·   Wound:       CXR:   · 9/4/2020 clear lung fields  CAT:      Discussed with patient, RN, CC, IM.    9/4/2020          Discussed with patient, RN, Dr Nadeen Hernandez. .    I have personally reviewed the past medical history, past surgical history, medications, social history, and family history, and I have updated the database accordingly. 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  fistulogram negative       Past Surgical  History:     Past Surgical History:   Procedure Laterality Date    BLEPHAROPLASTY      BREAST BIOPSY      CHOLECYSTECTOMY      HYSTERECTOMY      TAVO due to fibroids    JOINT REPLACEMENT  2011    KIDNEY CYST REMOVAL      OTHER SURGICAL HISTORY      thyroid nodule removed    VULVAR/PERINEAL BIOPSY         Medications:      donepezil  10 mg Oral Nightly    Vitamin D  2,000 Units Oral Daily    therapeutic multivitamin-minerals  1 tablet Oral Daily    sodium chloride flush  10 mL Intravenous 2 times per day    enoxaparin  30 mg Subcutaneous BID    famotidine  20 mg Oral BID    ARIPiprazole  5 mg Oral Daily    citalopram  20 mg Oral Daily    [Held by provider] QUEtiapine  100 mg Oral Nightly    [Held by provider] furosemide  20 mg Oral Daily Social History:     Social History     Socioeconomic History    Marital status:      Spouse name: Not on file    Number of children: Not on file    Years of education: Not on file    Highest education level: Not on file   Occupational History    Not on file   Social Needs    Financial resource strain: Not on file    Food insecurity     Worry: Not on file     Inability: Not on file    Transportation needs     Medical: Not on file     Non-medical: Not on file   Tobacco Use    Smoking status: Former Smoker     Last attempt to quit: 10/16/1973     Years since quittin.9    Smokeless tobacco: Never Used    Tobacco comment: stopped 40 years ago   Substance and Sexual Activity    Alcohol use: Yes     Alcohol/week: 0.0 standard drinks    Drug use: No    Sexual activity: Never   Lifestyle    Physical activity     Days per week: Not on file     Minutes per session: Not on file    Stress: Not on file   Relationships    Social connections     Talks on phone: Not on file     Gets together: Not on file     Attends [employer] service: Not on file     Active member of club or organization: Not on file     Attends meetings of clubs or organizations: Not on file     Relationship status: Not on file    Intimate partner violence     Fear of current or ex partner: Not on file     Emotionally abused: Not on file     Physically abused: Not on file     Forced sexual activity: Not on file   Other Topics Concern    Not on file   Social History Narrative    Not on file       Family History:     Family History   Problem Relation Age of Onset    Colon Cancer Brother         2004    Diabetes Father     Bipolar Disorder Mother     Osteoporosis Mother     Migraines Mother         Allergies:   Patient has no known allergies. Review of Systems:       Constitutional: No fevers or chills. Malaise, weakness, fatigue  Head: No headaches  Eyes: No double vision or blurry vision.  No conjunctival inflammation. ENT: No sore throat or runny nose. . No hearing loss, tinnitus or vertigo. Cardiovascular: No chest pain or palpitations. No shortness of breath. No BARROS  Lung: No shortness of breath or cough. No sputum production  Abdomen: No nausea, vomiting, diarrhea, or abdominal pain. Shahida Addie No cramps. Genitourinary: No increased urinary frequency, or dysuria. No hematuria. No suprapubic or CVA pain  Musculoskeletal: No muscle aches or pains. No joint effusions, swelling or deformities  Hematologic: No bleeding or bruising. Neurologic: No headache, weakness, numbness, or tingling. Integument: No rash, no ulcers. Psychiatric: No depression. Endocrine: No polyuria, no polydipsia, no polyphagia. Physical Examination :     Patient Vitals for the past 8 hrs:   BP Temp Temp src Pulse Resp SpO2   09/07/20 0800 (!) 111/49 97.8 °F (36.6 °C) Oral (!) 49 15 (!) 89 %   09/07/20 0650 (!) 107/58 -- -- (!) 45 15 93 %   09/07/20 0400 (!) 103/52 97.6 °F (36.4 °C) Oral 50 15 99 %     General Appearance: Awake, alert, and in no apparent distress. Elderly woman  Head:  Normocephalic, no trauma  Eyes: Pupils equal, round, reactive to light and accommodation; extraocular movements intact; sclera anicteric; conjunctivae pink. No embolic phenomena. ENT: Oropharynx clear, without erythema, exudate, or thrush. No tenderness of sinuses. Mouth/throat: mucosa pink and moist. No lesions. Dentition in good repair. Neck:Supple, without lymphadenopathy. Thyroid normal, No bruits. Pulmonary/Chest: Clear to auscultation, without wheezes, rales, or rhonchi. Cardiovascular: Regular rate and rhythm without murmurs, rubs, or gallops. Abdomen: Soft, non tender. Bowel sounds normal. No organomegaly  All four Extremities: No cyanosis, clubbing, edema, or effusions. Neurologic: No gross sensory or motor deficits. Skin: Warm and dry with good turgor. Signs of peripheral arterial insufficiency. No ulcerations. No open wounds.     Medical Decision Making -Laboratory:   I have independently reviewed/ordered the following labs:    CBC with Differential:   Recent Labs     09/05/20 0526 09/06/20 2058   WBC 2.5* 3.3*   HGB 10.8* 10.6*   HCT 33.0* 33.2*   * See Reflexed IPF Result   LYMPHOPCT 37 40   MONOPCT 19* 13*     BMP:   Recent Labs     09/05/20 0526 09/06/20  1402    137   K 4.6 4.6    100   CO2 24 23   BUN 24* 17   CREATININE 0.87 0.74     Hepatic Function Panel:   Recent Labs     09/05/20 0526 09/06/20  1402   PROT 5.5* 7.0   LABALBU 3.5 4.0   BILITOT 0.28* 0.31   ALKPHOS 43 56   ALT 27 33   AST 34* 46*     No results for input(s): RPR in the last 72 hours. No results for input(s): HIV in the last 72 hours. No results for input(s): BC in the last 72 hours. Lab Results   Component Value Date    MUCUS NOT REPORTED 04/12/2018    RBC 3.39 09/06/2020    TRICHOMONAS NOT REPORTED 04/12/2018    WBC 3.3 09/06/2020    YEAST NOT REPORTED 04/12/2018    TURBIDITY CLEAR 09/04/2020     Lab Results   Component Value Date    CREATININE 0.74 09/06/2020    GLUCOSE 91 09/06/2020       Medical Decision Making-Imaging:     EXAMINATION:    ONE XRAY VIEW OF THE CHEST         9/4/2020 6:48 pm         COMPARISON:    Chest 04/16/2016         HISTORY:    ORDERING SYSTEM PROVIDED HISTORY: SOB    TECHNOLOGIST PROVIDED HISTORY:    SOB    Reason for Exam: Fatigue, fever    Acuity: Acute    Type of Exam: Initial         FINDINGS:    Calcifications involving the aorta reflect atherosclerosis. The    cardiomediastinal and hilar silhouettes appear otherwise unremarkable. Sequela from old granulomatous disease.  No focal infiltrate or pleural    effusion evident.  No pneumothorax is seen.  No acute osseous abnormality is    identified.              Impression    No acute pulmonary disease.         Calcific atherosclerotic disease aorta.               Medical Decision Hsyhql-Ddtufyrm-Tiawm:       Medical Decision Making-Other:     Note:  · Labs, medications, radiologic studies were reviewed with personal review of films  · Large amounts of data were reviewed  · Discussed with nursing Staff, Discharge planner  · Infection Control and Prevention measures reviewed  · All prior entries were reviewed  · Administer medications as ordered  · Prognosis: Fair  · Discharge planning reviewed  · Follow up as outpatient. Thank you for allowing us to participate in the care of this patient. Please call with questions.     Ericka Hoffman MD  Pager: [phone] - Office: [phone]

## 2020-09-07 NOTE — PROGRESS NOTES
Physical Therapy    Facility/Department: [facility]  Initial Assessment    NAME: Melanie Lou  : 10/28/1932  MRN: [id number]    Date of Service: 2020    Discharge Recommendations:    Further therapy recommended at discharge. PT Equipment Recommendations  Equipment Needed: No  Other: Pt has rollator    Assessment   Body structures, Functions, Activity limitations: Decreased functional mobility ; Decreased strength;Decreased balance;Decreased endurance  Assessment: Pt grossly CGA for mobility, amb 50' RW CGA. Pt would benefit from continued acute PT to address deficits. Prognosis: Good  Decision Making: Medium Complexity  PT Education: Plan of Care;PT Role;General Safety  REQUIRES PT FOLLOW UP: Yes  Activity Tolerance  Activity Tolerance: Patient Tolerated treatment well;Patient limited by fatigue;Patient limited by endurance       Patient Diagnosis(es): There were no encounter diagnoses. has a past medical history of Anemia, Anxiety, Naranjo disease, Bradycardia, Cataracts, bilateral, Colon polyp, Diverticulosis, Fibroid, Forgetfulness, Gait disturbance, Gall stones, Headache(784.0), Hearing loss, HTN (hypertension), Insomnia, Lichen sclerosus, Osteoporosis, Other activity(E029.9), Pap smear for cervical cancer screening, Pneumonia, Vaginal atrophy, and Vaginal discharge. has a past surgical history that includes vulvar/perineal biopsy; Cholecystectomy; blepharoplasty; other surgical history;  Hysterectomy; Breast biopsy; joint replacement (); and Kidney cyst removal.    Restrictions  Restrictions/Precautions  Restrictions/Precautions: Fall Risk, Isolation(COVID + / Droplet +)  Required Braces or Orthoses?: No  Position Activity Restriction  Other position/activity restrictions: up with assist  Vision/Hearing  Vision: Impaired  Vision Exceptions: Wears glasses at all times  Hearing: Exceptions to [facility]  Hearing Exceptions: Bilateral hearing aid     Subjective  General  Chart Reviewed: Yes  Patient assessed for rehabilitation services?: Yes  Response To Previous Treatment: Not applicable  Family / Caregiver Present: No  Follows Commands: Within Functional Limits  General Comment  Comments: OT co-eval  Subjective  Subjective: RN and pt agreeable to PT. Pt alert in chair upon arrival.  Pain Screening  Patient Currently in Pain: Denies  Vital Signs  Patient Currently in Pain: Denies  Pre Treatment Pain Screening  Intervention List: Patient able to continue with treatment    Orientation  Orientation  Overall Orientation Status: Within Functional Limits  Social/Functional History  Social/Functional History  Lives With: Alone  Type of Home: House  Home Layout: One level  Home Access: Stairs to enter without rails  Entrance Stairs - Number of Steps: 1(able to hold L wall)  Bathroom Shower/Tub: Walk-in shower  Bathroom Toilet: Standard  Bathroom Equipment: Grab bars in shower  Bathroom Accessibility: Accessible  Home Equipment: (rollator, uses prn. home health looking for better shower chair)  Receives Help From: Home health([facility]: RN, PT, OT just starting. 2x/ wk. just started)  Homemaking Assistance: ( cleans, does some launder  does other.  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  Cognition        Objective          AROM RLE (degrees)  RLE AROM: WFL  AROM LLE (degrees)  LLE AROM : WFL  AROM RUE (degrees)  RUE AROM : WFL  AROM LUE (degrees)  LUE AROM : WFL  Strength RLE  Strength RLE: WFL  Strength LLE  Strength LLE: WFL  Strength RUE  Strength RUE: WFL  Strength LUE  Strength LUE: WFL     Sensation  Overall Sensation Status: WFL  Bed mobility  Comment: in chair upon arrival, returned to chair  Transfers  Sit to Stand: Contact guard assistance  Stand to sit: Contact guard assistance  Ambulation  Ambulation?: Yes  Ambulation 1  Surface: level tile  Device: Rolling Walker  Assistance: Contact guard assistance  Quality of Gait: slowed, small steps, less stable with turns without LOB  Distance: 50'  Stairs/Curb  Stairs?: No     Balance  Posture: Good  Sitting - Static: Good  Sitting - Dynamic: Good;-  Standing - Static: Fair;+  Standing - Dynamic: Fair  Comments: RW used while assessing standing balance  Exercises  Comments: wshaed hands at sink, see OT     Plan   Plan  Times per week: 3-5x/wk  Current Treatment Recommendations: Strengthening, Balance Training, Functional Mobility Training, Transfer Training, Gait Training, Endurance Training, Home Exercise Program, Safety Education & Training, Patient/Caregiver Education & Training, Equipment Evaluation, Education, & procurement, Stair training  Safety Devices  Type of devices: Call light within reach, Nurse notified, Gait belt, Patient at risk for falls, Left in chair, All fall risk precautions in place    AM-PAC Score  AM-PAC Inpatient Mobility Raw Score : 17 (09/07/20 1513)  AM-PAC Inpatient T-Scale Score : 42.13 (09/07/20 1513)  Mobility Inpatient CMS 0-100% Score: 50.57 (09/07/20 1513)  Mobility Inpatient CMS G-Code Modifier : CK (09/07/20 1513)          Goals  Short term goals  Time Frame for Short term goals: 14 visits  Short term goal 1: Pt will be Yareli bed mobility  Short term goal 2: Pt will be Yareli transfers  Short term goal 3: Pt will be Yareli amb 200' RW or least restrictive AD  Short term goal 4: Pt will navigate 2 steps Yareli       Therapy Time   Individual Concurrent Group Co-treatment   Time In 1001         Time Out 1027         Minutes 26         Timed Code Treatment Minutes: 8 Minutes       Devaughn Hector, PT

## 2020-09-08 LAB
-: NORMAL
ABSOLUTE EOS #: <0.03 K/UL (ref 0–0.44)
ABSOLUTE IMMATURE GRANULOCYTE: <0.03 K/UL (ref 0–0.3)
ABSOLUTE LYMPH #: 1.08 K/UL (ref 1.1–3.7)
ABSOLUTE MONO #: 0.39 K/UL (ref 0.1–1.2)
ALBUMIN SERPL-MCNC: 3.1 G/DL (ref 3.5–5.2)
ALBUMIN/GLOBULIN RATIO: 1.1 (ref 1–2.5)
ALP BLD-CCNC: 50 U/L (ref 35–104)
ALT SERPL-CCNC: 38 U/L (ref 5–33)
ANION GAP SERPL CALCULATED.3IONS-SCNC: 11 MMOL/L (ref 9–17)
AST SERPL-CCNC: 50 U/L
BASOPHILS # BLD: 0 % (ref 0–2)
BASOPHILS ABSOLUTE: <0.03 K/UL (ref 0–0.2)
BILIRUB SERPL-MCNC: 0.37 MG/DL (ref 0.3–1.2)
BUN BLDV-MCNC: 24 MG/DL (ref 8–23)
BUN/CREAT BLD: ABNORMAL (ref 9–20)
CALCIUM SERPL-MCNC: 8.5 MG/DL (ref 8.6–10.4)
CHLORIDE BLD-SCNC: 103 MMOL/L (ref 98–107)
CO2: 20 MMOL/L (ref 20–31)
CREAT SERPL-MCNC: 1.02 MG/DL (ref 0.5–0.9)
DIFFERENTIAL TYPE: ABNORMAL
EOSINOPHILS RELATIVE PERCENT: 0 % (ref 1–4)
GFR AFRICAN AMERICAN: >60 ML/MIN
GFR NON-AFRICAN AMERICAN: 51 ML/MIN
GFR SERPL CREATININE-BSD FRML MDRD: ABNORMAL ML/MIN/{1.73_M2}
GFR SERPL CREATININE-BSD FRML MDRD: ABNORMAL ML/MIN/{1.73_M2}
GLUCOSE BLD-MCNC: 120 MG/DL (ref 65–105)
GLUCOSE BLD-MCNC: 70 MG/DL (ref 70–99)
HCT VFR BLD CALC: 35.3 % (ref 36.3–47.1)
HEMOGLOBIN: 10.9 G/DL (ref 11.9–15.1)
IMMATURE GRANULOCYTES: 0 %
LYMPHOCYTES # BLD: 30 % (ref 24–43)
MCH RBC QN AUTO: 30.4 PG (ref 25.2–33.5)
MCHC RBC AUTO-ENTMCNC: 30.9 G/DL (ref 28.4–34.8)
MCV RBC AUTO: 98.6 FL (ref 82.6–102.9)
MONOCYTES # BLD: 11 % (ref 3–12)
NRBC AUTOMATED: 0 PER 100 WBC
PDW BLD-RTO: 13.2 % (ref 11.8–14.4)
PLATELET # BLD: ABNORMAL K/UL (ref 138–453)
PLATELET ESTIMATE: ABNORMAL
PLATELET, FLUORESCENCE: 106 K/UL (ref 138–453)
PLATELET, IMMATURE FRACTION: 4.4 % (ref 1.1–10.3)
PMV BLD AUTO: ABNORMAL FL (ref 8.1–13.5)
POTASSIUM SERPL-SCNC: 4.8 MMOL/L (ref 3.7–5.3)
RBC # BLD: 3.58 M/UL (ref 3.95–5.11)
RBC # BLD: ABNORMAL 10*6/UL
REASON FOR REJECTION: NORMAL
SEG NEUTROPHILS: 58 % (ref 36–65)
SEGMENTED NEUTROPHILS ABSOLUTE COUNT: 2.06 K/UL (ref 1.5–8.1)
SODIUM BLD-SCNC: 134 MMOL/L (ref 135–144)
TOTAL PROTEIN: 5.8 G/DL (ref 6.4–8.3)
WBC # BLD: 3.6 K/UL (ref 3.5–11.3)
WBC # BLD: ABNORMAL 10*3/UL
ZZ NTE CLEAN UP: ORDERED TEST: NORMAL
ZZ NTE WITH NAME CLEAN UP: SPECIMEN SOURCE: NORMAL

## 2020-09-08 PROCEDURE — 6370000000 HC RX 637 (ALT 250 FOR IP): Performed by: INTERNAL MEDICINE

## 2020-09-08 PROCEDURE — 6370000000 HC RX 637 (ALT 250 FOR IP): Performed by: FAMILY MEDICINE

## 2020-09-08 PROCEDURE — 97535 SELF CARE MNGMENT TRAINING: CPT

## 2020-09-08 PROCEDURE — U0003 INFECTIOUS AGENT DETECTION BY NUCLEIC ACID (DNA OR RNA); SEVERE ACUTE RESPIRATORY SYNDROME CORONAVIRUS 2 (SARS-COV-2) (CORONAVIRUS DISEASE [COVID-19]), AMPLIFIED PROBE TECHNIQUE, MAKING USE OF HIGH THROUGHPUT TECHNOLOGIES AS DESCRIBED BY CMS-2020-01-R: HCPCS

## 2020-09-08 PROCEDURE — 97116 GAIT TRAINING THERAPY: CPT

## 2020-09-08 PROCEDURE — 85055 RETICULATED PLATELET ASSAY: CPT

## 2020-09-08 PROCEDURE — 80053 COMPREHEN METABOLIC PANEL: CPT

## 2020-09-08 PROCEDURE — 36415 COLL VENOUS BLD VENIPUNCTURE: CPT

## 2020-09-08 PROCEDURE — 99232 SBSQ HOSP IP/OBS MODERATE 35: CPT | Performed by: INTERNAL MEDICINE

## 2020-09-08 PROCEDURE — 99233 SBSQ HOSP IP/OBS HIGH 50: CPT | Performed by: INTERNAL MEDICINE

## 2020-09-08 PROCEDURE — 85025 COMPLETE CBC W/AUTO DIFF WBC: CPT

## 2020-09-08 PROCEDURE — 97110 THERAPEUTIC EXERCISES: CPT

## 2020-09-08 PROCEDURE — 2060000000 HC ICU INTERMEDIATE R&B

## 2020-09-08 PROCEDURE — 6360000002 HC RX W HCPCS: Performed by: INTERNAL MEDICINE

## 2020-09-08 PROCEDURE — 6370000000 HC RX 637 (ALT 250 FOR IP): Performed by: NURSE PRACTITIONER

## 2020-09-08 PROCEDURE — 2580000003 HC RX 258: Performed by: INTERNAL MEDICINE

## 2020-09-08 RX ADMIN — FAMOTIDINE 20 MG: 20 TABLET, FILM COATED ORAL at 19:55

## 2020-09-08 RX ADMIN — SODIUM CHLORIDE, PRESERVATIVE FREE 10 ML: 5 INJECTION INTRAVENOUS at 19:56

## 2020-09-08 RX ADMIN — VITAMIN D, TAB 1000IU (100/BT) 2000 UNITS: 25 TAB at 09:12

## 2020-09-08 RX ADMIN — ENOXAPARIN SODIUM 30 MG: 30 INJECTION SUBCUTANEOUS at 09:12

## 2020-09-08 RX ADMIN — ARIPIPRAZOLE 5 MG: 5 TABLET ORAL at 09:12

## 2020-09-08 RX ADMIN — BISACODYL 5 MG: 5 TABLET, COATED ORAL at 00:29

## 2020-09-08 RX ADMIN — CITALOPRAM 20 MG: 20 TABLET, FILM COATED ORAL at 09:12

## 2020-09-08 RX ADMIN — FAMOTIDINE 20 MG: 20 TABLET, FILM COATED ORAL at 09:12

## 2020-09-08 RX ADMIN — DONEPEZIL HYDROCHLORIDE 10 MG: 10 TABLET, FILM COATED ORAL at 19:55

## 2020-09-08 RX ADMIN — MULTIPLE VITAMINS W/ MINERALS TAB 1 TABLET: TAB at 09:12

## 2020-09-08 RX ADMIN — ENOXAPARIN SODIUM 30 MG: 30 INJECTION SUBCUTANEOUS at 19:55

## 2020-09-08 RX ADMIN — SODIUM CHLORIDE, PRESERVATIVE FREE 10 ML: 5 INJECTION INTRAVENOUS at 09:16

## 2020-09-08 ASSESSMENT — ENCOUNTER SYMPTOMS
NAUSEA: 0
WHEEZING: 0
CONSTIPATION: 0
CHEST TIGHTNESS: 0
SHORTNESS OF BREATH: 0
BLOOD IN STOOL: 0
COUGH: 0
ABDOMINAL PAIN: 0
VOMITING: 0
DIARRHEA: 0

## 2020-09-08 NOTE — PROGRESS NOTES
Occupational Therapy  Facility/Department: [facility] CAR 3  Daily Treatment Note  NAME: Clarita Lou  : 10/28/1932  MRN: [id number]    Date of Service: 2020    Discharge Recommendations:  Patient would benefit from continued therapy after discharge   Ed on OT services, transfer safety, walker safety/technique, ADLs, orientation review- good return       Assessment   Performance deficits / Impairments: Decreased functional mobility ; Decreased ADL status; Decreased strength;Decreased endurance;Decreased balance;Decreased high-level IADLs  Prognosis: Good  REQUIRES OT FOLLOW UP: Yes  Activity Tolerance  Activity Tolerance: Patient Tolerated treatment well         Patient Diagnosis(es): There were no encounter diagnoses. has a past medical history of Anemia, Anxiety, Naranjo disease, Bradycardia, Cataracts, bilateral, Colon polyp, Diverticulosis, Fibroid, Forgetfulness, Gait disturbance, Gall stones, Headache(784.0), Hearing loss, HTN (hypertension), Insomnia, Lichen sclerosus, Osteoporosis, Other activity(E029.9), Pap smear for cervical cancer screening, Pneumonia, Vaginal atrophy, and Vaginal discharge. has a past surgical history that includes vulvar/perineal biopsy; Cholecystectomy; blepharoplasty; other surgical history; Hysterectomy; Breast biopsy; joint replacement (); and Kidney cyst removal.    Restrictions  Restrictions/Precautions  Restrictions/Precautions: Fall Risk, Isolation  Required Braces or Orthoses?: No  Position Activity Restriction  Other position/activity restrictions: up with assist  Subjective   General  Patient assessed for rehabilitation services?: Yes  Family / Caregiver Present: No    Vital Signs  Patient Currently in Pain: No   Orientation  Orientation  Overall Orientation Status: Within Normal Limits  Objective    Pt in bed upon arrival awake and alert. Pt demo bed and completed ADLs seated on EOB. Pt used SW to complete functional transfers on this date.  Pt receptive to ed and retired to recliner at end of session. ADL  Grooming: Setup; Increased time to complete;Stand by assistance  UE Bathing: Stand by assistance  LE Bathing: Setup;Minimal assistance  UE Dressing: Setup;Minimal assistance  LE Dressing: Setup;Maximum assistance  Toileting: Setup; Moderate assistance;Minimal assistance  Additional Comments: pt completed ADLs seated on EOB   Balance  Sitting Balance: Stand by assistance  Standing Balance: Contact guard assistance(/min A w/ SW)  Standing Balance  Time: stood 3 x for ~ 1-2 min each time for transfers and LB care  Toilet Transfers  Toilet - Technique: Stand pivot  Equipment Used: Standard bedside commode  Toilet Transfer: Minimal assistance  Bed mobility  Supine to Sit: Minimal assistance  Scooting: Minimal assistance  Comment: HOB elevated and pt used bed rails   Transfers  Stand Step Transfers: Minimal assistance(w/ SW)  Sit to stand: Minimal assistance  Stand to sit: Minimal assistance  Attendance  Participation: Active participation            Plan   Plan  Times per week: 3-4 x/wk  Current Treatment Recommendations: Balance Training, Functional Mobility Training, Endurance Training, Safety Education & Training, Patient/Caregiver Education & Training, Equipment Evaluation, Education, & procurement, Self-Care / ADL    Goals  Short term goals  Time Frame for Short term goals: By discharge, pt will:  Short term goal 1: Demo Mod I with use of LRD for functional transfers and functional mobility  Short term goal 2: Demo I with setup for UB ADLs and grooming tasks  Short term goal 3: Demo I with setup for LB ADLs and toileting tasks  Short term goal 4: Demo 8+ minutes dynamic standing to increase safety and independence with ADLs/IADLs  Short term goal 5: Demo I with V/D 2+ EC/WS techniques to incorporate into daily routine       Therapy Time   Individual Concurrent Group Co-treatment   Time In  13:40         Time Out  14:35         Minutes  55              time code min: 55 lili Padilla, MARTINEZ/L

## 2020-09-08 NOTE — CARE COORDINATION
TRANSITIONAL CARE PLANNING/ 2 [facility]: 3    Reason for Admission: COVID-19 [U07.1]     Treatment Plan of Care: full code, daughter requesting covid test so she can go to a rehab of her choice ps dr Yeung Sri orders received test pending       Readmission Risk            Risk of Unplanned Readmission:        19            Patient goals/Treatment Preferences/Transitional Plan: man from [facility] denied. briana with [facility] called updated daughter wants to cancel referral.     11:50 spoke to kika wiley reviewing   12:40 ken with [facility] called unable to accept. . Ps dr holley to see when patient will be ready for dc  16:44 dc order in, called daughter Lord Zarate wants referral to [facility]  Called april discussed referral will review

## 2020-09-08 NOTE — PROGRESS NOTES
creatinine of 1.08 elevated BNP at 2000 with some elevation of troponin at 32, inflammatory markers were not significantly elevated, patient initial chest x-ray was not remarkable for any acute pathology. He was transferred to our facility for further management  Currently she denies any chest pain, sob, nausea, vomiting, fever or chills   She is hypoxic . Review of Systems:     Review of Systems   Constitutional: Negative for chills, diaphoresis and fever. HENT: Negative for congestion. Eyes: Negative for visual disturbance. Respiratory: Negative for cough, chest tightness, shortness of breath and wheezing. Cardiovascular: Negative for chest pain, palpitations and leg swelling. Gastrointestinal: Negative for abdominal pain, blood in stool, constipation, diarrhea, nausea and vomiting. Genitourinary: Negative for difficulty urinating. Neurological: Negative for dizziness, weakness, light-headedness, numbness and headaches. All other systems reviewed and are negative. Medications:      Allergies:  No Known Allergies    Current Meds:   Scheduled Meds:    donepezil  10 mg Oral Nightly    Vitamin D  2,000 Units Oral Daily    therapeutic multivitamin-minerals  1 tablet Oral Daily    sodium chloride flush  10 mL Intravenous 2 times per day    enoxaparin  30 mg Subcutaneous BID    famotidine  20 mg Oral BID    ARIPiprazole  5 mg Oral Daily    citalopram  20 mg Oral Daily    [Held by provider] QUEtiapine  100 mg Oral Nightly    [Held by provider] furosemide  20 mg Oral Daily     Continuous Infusions:   PRN Meds: midodrine, bisacodyl, polyethylene glycol, sodium chloride flush, potassium chloride **OR** potassium alternative oral replacement **OR** potassium chloride, magnesium sulfate, acetaminophen **OR** acetaminophen, promethazine **OR** ondansetron, nicotine, senna    Data:     Past Medical History:   has a past medical history of Anemia, Anxiety, Naranjo disease, Bradycardia, Cataracts, bilateral, Colon polyp, Diverticulosis, Fibroid, Forgetfulness, Gait disturbance, Gall stones, Headache(784.0), Hearing loss, HTN (hypertension), Insomnia, Lichen sclerosus, Osteoporosis, Other activity(E029.9), Pap smear for cervical cancer screening, Pneumonia, Vaginal atrophy, and Vaginal discharge. Social History:   reports that she quit smoking about 46 years ago. She has never used smokeless tobacco. She reports current alcohol use. She reports that she does not use drugs. Family History:   Family History   Problem Relation Age of Onset    Colon Cancer Brother             Diabetes Father     Bipolar Disorder Mother     Osteoporosis Mother    Anel Lal Migraines Mother        Vitals:  BP (!) 150/74   Pulse 69   Temp 98 °F (36.7 °C) (Oral)   Resp 15   Ht 5' 9\" (1.753 m)   Wt 154 lb (69.9 kg)   SpO2 93%   BMI 22.74 kg/m²   Temp (24hrs), Av °F (36.7 °C), Min:98 °F (36.7 °C), Max:98 °F (36.7 °C)    Recent Labs     20  0436   POCGLU 120*       I/O (24Hr):     Intake/Output Summary (Last 24 hours) at 2020 1610  Last data filed at 2020 0535  Gross per 24 hour   Intake 300 ml   Output --   Net 300 ml       Labs:  Hematology:  Recent Labs     208 20  0848   WBC 3.3* 3.6   RBC 3.39* 3.58*   HGB 10.6* 10.9*   HCT 33.2* 35.3*   MCV 97.9 98.6   MCH 31.3 30.4   MCHC 31.9 30.9   RDW 13.2 13.2   PLT See Reflexed IPF Result See Reflexed IPF Result   MPV NOT REPORTED NOT REPORTED   INR 1.0  --    DDIMER 0.73  --      Chemistry:  Recent Labs     20  1402 20  0525    134*   K 4.6 4.8    103   CO2 23 20   GLUCOSE 91 70   BUN 17 24*   CREATININE 0.74 1.02*   ANIONGAP 14 11   LABGLOM >60 51*   GFRAA >60 >60   CALCIUM 8.7 8.5*     Recent Labs     20  0436 20  1402 20  0525   PROT  --  7.0 5.8*   LABALBU  --  4.0 3.1*   AST  --  46* 50*   ALT  --  33 38*   ALKPHOS  --  56 50   BILITOT  --  0.31 0.37   POCGLU 120*  --   --      ABG:No results found for: POCPH, PHART, PH, POCPCO2, JFA5BIF, PCO2, POCPO2, PO2ART, PO2, POCHCO3, OBW3HGV, HCO3, NBEA, PBEA, BEART, BE, THGBART, THB, KAQ6INR, MKLX5PQC, E2NKVJJH, O2SAT, FIO2  Lab Results   Component Value Date/Time    SPECIAL NOT REPORTED 09/04/2020 08:31 PM     Lab Results   Component Value Date/Time    CULTURE NO GROWTH 09/04/2020 08:31 PM       Radiology:  Xr Chest Portable    Result Date: 9/4/2020  No acute pulmonary disease. Calcific atherosclerotic disease aorta. Physical Examination:        General appearance: Sleepy but arousable, cooperative and no distress  Mental Status:  oriented to person, place and time and normal affect  Lungs:  clear to auscultation bilaterally, normal effort  Heart:  regular rate and rhythm, no murmur  Abdomen:  soft, nontender, nondistended, normal bowel sounds, no masses, hepatomegaly, splenomegaly  Extremities:  no edema, redness, tenderness in the calves  Skin:  no gross lesions, rashes, induration    Assessment:        Hospital Problems           Last Modified POA    * (Principal) COVID-19 virus infection 9/4/2020 Yes    Essential hypertension 9/4/2020 Yes    Thrombocytopenia ([address] 9/4/2020 Yes    Elevated troponin 9/5/2020 Yes    Elevated brain natriuretic peptide (BNP) level 9/5/2020 Yes          Plan:        COVID-19 infection: Droplet plus plus isolation precautions, positive 9/4/2020. Will recheck for placement. Essential hypertension: Continue home medication.     Dementia/cognitive impairment: Continue chronic medication     Thrombocytopenia monitor closely: Okay for DVT prophylaxis for now     - ok for discharge from IM standpoint, pending COVID testing for placement.        Welton Sacks,   9/8/2020  4:10 PM

## 2020-09-08 NOTE — PLAN OF CARE
Problem: Airway Clearance - Ineffective  Goal: Achieve or maintain patent airway  9/8/2020 0743 by Amber Ortiz RN  Outcome: Ongoing  9/8/2020 0536 by Pau Busby RN  Outcome: Ongoing     Problem: Gas Exchange - Impaired  Goal: Absence of hypoxia  9/8/2020 0743 by Amber Ortiz RN  Outcome: Ongoing  9/8/2020 0536 by Pau Busby RN  Outcome: Ongoing  Goal: Promote optimal lung function  9/8/2020 0743 by Amber Ortiz RN  Outcome: Ongoing  9/8/2020 0536 by Pau Busby RN  Outcome: Ongoing     Problem: Breathing Pattern - Ineffective  Goal: Ability to achieve and maintain a regular respiratory rate  9/8/2020 0743 by Amber Ortiz RN  Outcome: Ongoing  9/8/2020 0536 by Pau Busby RN  Outcome: Ongoing     Problem:  Body Temperature -  Risk of, Imbalanced  Goal: Ability to maintain a body temperature within defined limits  9/8/2020 0743 by Amber Ortiz RN  Outcome: Ongoing  9/8/2020 0536 by Pau Busby RN  Outcome: Ongoing  Goal: Will regain or maintain usual level of consciousness  9/8/2020 0743 by Amber Ortiz RN  Outcome: Ongoing  9/8/2020 0536 by Pau Busby RN  Outcome: Ongoing  Goal: Complications related to the disease process, condition or treatment will be avoided or minimized  9/8/2020 0743 by Amber Ortiz RN  Outcome: Ongoing  9/8/2020 0536 by Pau Busby RN  Outcome: Ongoing     Problem: Isolation Precautions - Risk of Spread of Infection  Goal: Prevent transmission of infection  9/8/2020 0743 by Amber Ortiz RN  Outcome: Ongoing  9/8/2020 0536 by Pau Busby RN  Outcome: Ongoing     Problem: Nutrition Deficits  Goal: Optimize nutrtional status  9/8/2020 0743 by Amber Ortiz RN  Outcome: Ongoing  9/8/2020 0536 by Pau Busby RN  Outcome: Ongoing     Problem: Risk for Fluid Volume Deficit  Goal: Maintain normal heart rhythm  9/8/2020 0743 by Amber Ortiz RN  Outcome: Ongoing  9/8/2020 0536 by Yassine Barlow RN  Outcome: Ongoing  Goal: Maintain absence of muscle cramping  9/8/2020 0743 by Harjit Patterson RN  Outcome: Ongoing  9/8/2020 0536 by Yassine Barlow RN  Outcome: Ongoing  Goal: Maintain normal serum potassium, sodium, calcium, phosphorus, and pH  9/8/2020 0743 by Harjit Patterson RN  Outcome: Ongoing  9/8/2020 0536 by Yassine Barlow RN  Outcome: Ongoing     Problem: Loneliness or Risk for Loneliness  Goal: Demonstrate positive use of time alone when socialization is not possible  9/8/2020 0743 by Harjit Patterson RN  Outcome: Ongoing  9/8/2020 0536 by Yassine Barlow RN  Outcome: Ongoing     Problem: Fatigue  Goal: Verbalize increase energy and improved vitality  9/8/2020 0743 by Harjit Patterson RN  Outcome: Ongoing  9/8/2020 0536 by Yassine Barlow RN  Outcome: Ongoing     Problem: Patient Education: Go to Patient Education Activity  Goal: Patient/Family Education  9/8/2020 [id number] by Harjit Patterson RN  Outcome: Ongoing  9/8/2020 0536 by Yassine Barlow RN  Outcome: Ongoing     Problem: Falls - Risk of:  Goal: Will remain free from falls  Description: Will remain free from falls  9/8/2020 0536 by Yassine Barlow RN  Outcome: Ongoing  Goal: Absence of physical injury  Description: Absence of physical injury  9/8/2020 0536 by Yassine Barlow RN  Outcome: Ongoing     Problem: Skin Integrity:  Goal: Will show no infection signs and symptoms  Description: Will show no infection signs and symptoms  9/8/2020 0743 by Harjit Patterson RN  Outcome: Ongoing  9/8/2020 0536 by Yassine Barlow RN  Outcome: Ongoing  Goal: Absence of new skin breakdown  Description: Absence of new skin breakdown  9/8/2020 0743 by Harjit Patterson RN  Outcome: Ongoing  9/8/2020 0536 by Yassine Barlow RN  Outcome: Ongoing

## 2020-09-08 NOTE — PROGRESS NOTES
and pt agreeable to PT. Pt alert in chair upon arrival.  General Comment  Comments: Pt left in recliner with call light within reach, alarm activated  Pain Screening  Patient Currently in Pain: Denies  Vital Signs  Patient Currently in Pain: Denies       Orientation  Orientation  Overall Orientation Status: Within Functional Limits  Cognition      Objective   Bed mobility  Scooting: Contact guard assistance  Comment: pt seated in recliner uponarrival and returned to chair after amb  Transfers  Sit to Stand: Contact guard assistance  Stand to sit: Contact guard assistance  Comment: vc's for UE placement with good carryover  Ambulation  Ambulation?: Yes  Ambulation 1  Surface: level tile  Device: Rolling Walker  Assistance: Contact guard assistance  Quality of Gait: slowed, small steps, less stable with turns without LOB  Gait Deviations: Slow Shanna; Shuffles;Decreased step length  Distance: 30ft  Comments: increased time and effort to complete  Stairs/Curb  Stairs?: No     Balance  Posture: Good  Sitting - Static: Good  Sitting - Dynamic: Good;-  Standing - Static: Fair;+  Standing - Dynamic: Fair  Comments: RW used while assessing standing balance  Exercises  Seated LE exercise program: Long Arc Quads, hip abduction/adduction, heel/toe raises, and marches.  Reps: 15x   Comments:      Goals  Short term goals  Time Frame for Short term goals: 14 visits  Short term goal 1: Pt will be Yareli bed mobility  Short term goal 2: Pt will be Yareli transfers  Short term goal 3: Pt will be Yareli amb 200' RW or least restrictive AD  Short term goal 4: Pt will navigate 2 steps Yareli    Plan    Plan  Times per week: 3-5x/wk  Current Treatment Recommendations: Strengthening, Balance Training, Functional Mobility Training, Transfer Training, Gait Training, Endurance Training, Home Exercise Program, Safety Education & Training, Patient/Caregiver Education & Training, Equipment Evaluation, Education, & procurement, Stair training  Safety

## 2020-09-09 LAB
SARS-COV-2, RAPID: ABNORMAL
SARS-COV-2: ABNORMAL
SARS-COV-2: DETECTED
SOURCE: ABNORMAL

## 2020-09-09 PROCEDURE — 99231 SBSQ HOSP IP/OBS SF/LOW 25: CPT | Performed by: INTERNAL MEDICINE

## 2020-09-09 PROCEDURE — 2060000000 HC ICU INTERMEDIATE R&B

## 2020-09-09 PROCEDURE — 6370000000 HC RX 637 (ALT 250 FOR IP): Performed by: FAMILY MEDICINE

## 2020-09-09 PROCEDURE — 99233 SBSQ HOSP IP/OBS HIGH 50: CPT | Performed by: INTERNAL MEDICINE

## 2020-09-09 PROCEDURE — 94761 N-INVAS EAR/PLS OXIMETRY MLT: CPT

## 2020-09-09 PROCEDURE — 97535 SELF CARE MNGMENT TRAINING: CPT

## 2020-09-09 PROCEDURE — 6370000000 HC RX 637 (ALT 250 FOR IP): Performed by: INTERNAL MEDICINE

## 2020-09-09 PROCEDURE — 6360000002 HC RX W HCPCS: Performed by: INTERNAL MEDICINE

## 2020-09-09 PROCEDURE — 2580000003 HC RX 258: Performed by: INTERNAL MEDICINE

## 2020-09-09 RX ORDER — LISINOPRIL 5 MG/1
5 TABLET ORAL DAILY
Status: DISCONTINUED | OUTPATIENT
Start: 2020-09-09 | End: 2020-09-09

## 2020-09-09 RX ORDER — METOPROLOL SUCCINATE 50 MG/1
50 TABLET, EXTENDED RELEASE ORAL DAILY
Status: DISCONTINUED | OUTPATIENT
Start: 2020-09-09 | End: 2020-09-09

## 2020-09-09 RX ORDER — FAMOTIDINE 20 MG/1
20 TABLET, FILM COATED ORAL DAILY
Status: DISCONTINUED | OUTPATIENT
Start: 2020-09-10 | End: 2020-09-14 | Stop reason: HOSPADM

## 2020-09-09 RX ORDER — METOPROLOL SUCCINATE 25 MG/1
25 TABLET, EXTENDED RELEASE ORAL DAILY
Status: DISCONTINUED | OUTPATIENT
Start: 2020-09-09 | End: 2020-09-10

## 2020-09-09 RX ORDER — SODIUM CHLORIDE 9 MG/ML
INJECTION, SOLUTION INTRAVENOUS CONTINUOUS
Status: DISCONTINUED | OUTPATIENT
Start: 2020-09-09 | End: 2020-09-09

## 2020-09-09 RX ADMIN — ENOXAPARIN SODIUM 30 MG: 30 INJECTION SUBCUTANEOUS at 21:12

## 2020-09-09 RX ADMIN — ENOXAPARIN SODIUM 30 MG: 30 INJECTION SUBCUTANEOUS at 09:19

## 2020-09-09 RX ADMIN — CITALOPRAM 20 MG: 20 TABLET, FILM COATED ORAL at 09:19

## 2020-09-09 RX ADMIN — SODIUM CHLORIDE, PRESERVATIVE FREE 10 ML: 5 INJECTION INTRAVENOUS at 09:22

## 2020-09-09 RX ADMIN — SODIUM CHLORIDE, PRESERVATIVE FREE 10 ML: 5 INJECTION INTRAVENOUS at 21:13

## 2020-09-09 RX ADMIN — ACETAMINOPHEN 650 MG: 325 TABLET ORAL at 23:31

## 2020-09-09 RX ADMIN — DONEPEZIL HYDROCHLORIDE 10 MG: 10 TABLET, FILM COATED ORAL at 21:12

## 2020-09-09 RX ADMIN — ARIPIPRAZOLE 5 MG: 5 TABLET ORAL at 09:19

## 2020-09-09 RX ADMIN — MULTIPLE VITAMINS W/ MINERALS TAB 1 TABLET: TAB at 09:19

## 2020-09-09 RX ADMIN — VITAMIN D, TAB 1000IU (100/BT) 2000 UNITS: 25 TAB at 09:19

## 2020-09-09 RX ADMIN — FAMOTIDINE 20 MG: 20 TABLET, FILM COATED ORAL at 09:19

## 2020-09-09 ASSESSMENT — ENCOUNTER SYMPTOMS
BLOOD IN STOOL: 0
VOMITING: 0
CHEST TIGHTNESS: 0
CONSTIPATION: 0
NAUSEA: 0
SHORTNESS OF BREATH: 0
DIARRHEA: 0
WHEEZING: 0
ABDOMINAL PAIN: 0
COUGH: 0

## 2020-09-09 ASSESSMENT — PAIN SCALES - GENERAL: PAINLEVEL_OUTOF10: 3

## 2020-09-09 NOTE — PROGRESS NOTES
[facility] -. Progress Note  COVID 19 Patient  Today's Date and Time: 9/8/2020, 8:50 PM    Impression :   · COVID 19 Suspect  · COVID 19 Confirmed Infection   · COVID-19 tests:  · 9/4/2020 positive  · Congestive heart failure    Recommendations:   · Monitor off antibiotics  · Clinical Research will approach patient to explore if he qualifies for any of the COVID 19 treatment protocols. At the current stage she does not seem to qualify for any therapy  · Remdesivir. Patient does not qualify as she is not requiring any oxygen support  · Suggest gentle diuresis      Medical Decision Making/Summary/Discussion:9/8/2020     · Patient admitted with suspected COVID 19 infection  · Covid test confirmed positive. · She has associated congestive heart failure  · Currently no signs of pneumoniae associated with the COVID   · Has shown some improvement with treatment of congestive heart failure. · ID wise no objection to continuing care outside the hospital  Infection Control Recommendations   · Universal Precautions  · Airborne isolation  · Droplet Isolation    Antimicrobial Stewardship Recommendations     · Discontinuation of therapy  Coordination of Outpatient Care:   · Estimated Length of IV antimicrobials:TBD  · Patient will need Midline Catheter Insertion: TBD  · Patient will need PICC line Insertion: No  · Patient will need: Home IV , [place],  SNF,  LTAC:TBD  · Patient will need outpatient wound care:No    Chief complaint/reason for consultation:   · Concern for COVID infection      History of Present Illness:   Eileen Foley is a 80y.o.-year-old  female who was initially admitted on 9/5/2020. Patient seen at the request of Joaquin Kwong. INITIAL HISTORY:    Patient presented through ER with complaints of fatigue, generalized weakness and fevers. The patient denied headache, cough, shortness of breath, expectoration of secretions.   She also denied GI symptoms such as abdominal pain nausea vomiting or diarrhea. The patient presented because she was fearful that her daughter may have COVID and she may have become exposed. The patient lives at her home but is helped by her family because of her age. Remains ambulatory with the help of a walker    In the emergency room her chest x-ray on 9/4/2020 was normal.  It showed atherosclerosis of the aorta but no pulmonary problems other than evidence of old granulomatous disease. White count was 3.0 with a hemoglobin of 10.9 and platelets of 201. Troponin and myoglobin were elevated. Her ferritin was elevated at 283. Her proBNP was elevated at 2186  Her COVID-19 test was positive on 9/4/2020. Patient admitted because of concerns with COVID 19.    CURRENT EVALUATION : 9/8/2020    Patient evaluated and examined in the ICU. Afebrile  VS stable  Doing well overall  Awaiting placement    Patient exhibiting respiratory distress. No  Respiratory secretions: No    Patient receiving supplemental oxygen. 2 L nasal cannula  RR 14-->15  02 sat 97-->100 -->93%      QTc:           NEWS Score: 0-4 Low risk group; 5-6: Medium risk group; 7 or above: High risk group  Parameters 3 2 1 0 1 2 3   Age    < 65   ? 65   RR ? 8  9-11 12-20  21-24 ? 25   O2 Sats ?  91 92-93 94-95 ? 96      Suppl O2  Yes  No      SBP ? 90  101-110 111-219   ? 220   HR ? 40  41-50 51-90  111-130 ? 131   Consciousness    Alert   Drowsiness, lethargy, or confusion   Temperature ? 35.0 C (95.0 F)  35.1-36.0 C 95.1-96.9 F 36.1-38.0 C 97.0-100.4 F 38.1-39.0 C 100.5-102.3 F ? 39.1 C ? 102.4 F      NEWS Score:   9/5/2020: 3 low risk    Overall Daily Picture:      Unchanged    Presence of secondary bacterial Infection:  No   Additional antibiotics: No    Labs, X rays reviewed: 9/8/2020    BUN: 24-->17  Cr: 0.87-->0.74    WBC: 3.0--> 2.5-->3.3  Hb: 10.8-->10.6  Plat: 117-->103    Absolute Neutrophils: 1.06  Absolute Lymphocytes: 0.9  Neutrophil/Lymphocyte Ratio: 1.17 low risk    CRP: 3.1  Ferritin: 283  LDH: 185    Pro Calcitonin:  proBNP 2186    Cultures:  Urine:  ·   Blood:  ·   Sputum :  ·   Wound:       CXR:   · 9/4/2020 clear lung fields  CAT:      Discussed with patient, RN, CC, IM.    9/4/2020          Discussed with patient, RN, Dr Georges Stanley. .    I have personally reviewed the past medical history, past surgical history, medications, social history, and family history, and I have updated the database accordingly. 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  fistulogram negative       Past Surgical  History:     Past Surgical History:   Procedure Laterality Date    BLEPHAROPLASTY      BREAST BIOPSY      CHOLECYSTECTOMY      HYSTERECTOMY      TAVO due to fibroids    JOINT REPLACEMENT  2011    KIDNEY CYST REMOVAL      OTHER SURGICAL HISTORY      thyroid nodule removed    VULVAR/PERINEAL BIOPSY         Medications:      donepezil  10 mg Oral Nightly    Vitamin D  2,000 Units Oral Daily    therapeutic multivitamin-minerals  1 tablet Oral Daily    sodium chloride flush  10 mL Intravenous 2 times per day    enoxaparin  30 mg Subcutaneous BID    famotidine  20 mg Oral BID    ARIPiprazole  5 mg Oral Daily    citalopram  20 mg Oral Daily    [Held by provider] QUEtiapine  100 mg Oral Nightly    furosemide  20 mg inflammation. ENT: No sore throat or runny nose. . No hearing loss, tinnitus or vertigo. Cardiovascular: No chest pain or palpitations. No shortness of breath. No BARROS  Lung: No shortness of breath or cough. No sputum production  Abdomen: No nausea, vomiting, diarrhea, or abdominal pain. Thomasena Dilling No cramps. Genitourinary: No increased urinary frequency, or dysuria. No hematuria. No suprapubic or CVA pain  Musculoskeletal: No muscle aches or pains. No joint effusions, swelling or deformities  Hematologic: No bleeding or bruising. Neurologic: No headache, weakness, numbness, or tingling. Integument: No rash, no ulcers. Psychiatric: No depression. Endocrine: No polyuria, no polydipsia, no polyphagia. Physical Examination :     Patient Vitals for the past 8 hrs:   BP Temp Temp src Pulse Resp SpO2   09/08/20 2000 (!) 147/90 99.3 °F (37.4 °C) Oral 75 21 94 %   09/08/20 1700 (!) 141/77 98.4 °F (36.9 °C) Oral 83 20 94 %     General Appearance: Awake, alert, and in no apparent distress. Elderly woman  Head:  Normocephalic, no trauma  Eyes: Pupils equal, round, reactive to light and accommodation; extraocular movements intact; sclera anicteric; conjunctivae pink. No embolic phenomena. ENT: Oropharynx clear, without erythema, exudate, or thrush. No tenderness of sinuses. Mouth/throat: mucosa pink and moist. No lesions. Dentition in good repair. Neck:Supple, without lymphadenopathy. Thyroid normal, No bruits. Pulmonary/Chest: Clear to auscultation, without wheezes, rales, or rhonchi. Cardiovascular: Regular rate and rhythm without murmurs, rubs, or gallops. Abdomen: Soft, non tender. Bowel sounds normal. No organomegaly  All four Extremities: No cyanosis, clubbing, edema, or effusions. Neurologic: No gross sensory or motor deficits. Skin: Warm and dry with good turgor. Signs of peripheral arterial insufficiency. No ulcerations. No open wounds.     Medical Decision Making -Laboratory:   I have independently reviewed/ordered the following labs:    CBC with Differential:   Recent Labs     09/06/20 2058 09/08/20  0848   WBC 3.3* 3.6   HGB 10.6* 10.9*   HCT 33.2* 35.3*   PLT See Reflexed IPF Result See Reflexed IPF Result   LYMPHOPCT 40 30   MONOPCT 13* 11     BMP:   Recent Labs     09/06/20  1402 09/08/20  0525    134*   K 4.6 4.8    103   CO2 23 20   BUN 17 24*   CREATININE 0.74 1.02*     Hepatic Function Panel:   Recent Labs     09/06/20  1402 09/08/20  0525   PROT 7.0 5.8*   LABALBU 4.0 3.1*   BILITOT 0.31 0.37   ALKPHOS 56 50   ALT 33 38*   AST 46* 50*     No results for input(s): RPR in the last 72 hours. No results for input(s): HIV in the last 72 hours. No results for input(s): BC in the last 72 hours. Lab Results   Component Value Date    MUCUS NOT REPORTED 04/12/2018    RBC 3.58 09/08/2020    TRICHOMONAS NOT REPORTED 04/12/2018    WBC 3.6 09/08/2020    YEAST NOT REPORTED 04/12/2018    TURBIDITY CLEAR 09/04/2020     Lab Results   Component Value Date    CREATININE 1.02 09/08/2020    GLUCOSE 70 09/08/2020       Medical Decision Making-Imaging:     EXAMINATION:    ONE XRAY VIEW OF THE CHEST         9/4/2020 6:48 pm         COMPARISON:    Chest 04/16/2016         HISTORY:    ORDERING SYSTEM PROVIDED HISTORY: SOB    TECHNOLOGIST PROVIDED HISTORY:    SOB    Reason for Exam: Fatigue, fever    Acuity: Acute    Type of Exam: Initial         FINDINGS:    Calcifications involving the aorta reflect atherosclerosis. The    cardiomediastinal and hilar silhouettes appear otherwise unremarkable. Sequela from old granulomatous disease.  No focal infiltrate or pleural    effusion evident.  No pneumothorax is seen.  No acute osseous abnormality is    identified.              Impression    No acute pulmonary disease.         Calcific atherosclerotic disease aorta.               Medical Decision Lkmtfg-Pupjorcm-Bdehy:       Medical Decision Making-Other:     Note:  · Labs, medications, radiologic studies were reviewed with personal review of films  · Large amounts of data were reviewed  · Discussed with nursing Staff, Discharge planner  · Infection Control and Prevention measures reviewed  · All prior entries were reviewed  · Administer medications as ordered  · Prognosis: Fair  · Discharge planning reviewed  · Follow up as outpatient. Thank you for allowing us to participate in the care of this patient. Please call with questions.     Sandra Lynch MD  Pager: [phone] - Office: [phone]

## 2020-09-09 NOTE — PROGRESS NOTES
No  Position Activity Restriction  Other position/activity restrictions: up with assist  Subjective   General  Patient assessed for rehabilitation services?: Yes  Family / Caregiver Present: No  General Comment  Comments: RN ok'd pt for OT tx this date. Pt agreeable to eval and pleasant/cooperative throughout. Pain Assessment  Response to Pain Intervention: Patient Satisfied  Vital Signs  Patient Currently in Pain: Denies      Objective    ADL  Feeding: Stand by assistance;Setup(able to open containers and feed self)  Grooming: Stand by assistance;Minimal assistance;Setup; Increased time to complete(SBA-wash face, brush hair, Min-brush dentures)  UE Bathing: Minimal assistance;Setup; Increased time to complete(pt would need assist w/back)  LE Bathing: Minimal assistance;Setup; Increased time to complete(pt would need assist w/feeet)  UE Dressing: Minimal assistance;Setup(pt would need assist w/gown)  LE Dressing: Moderate assistance;Setup(pt needed assist w/ donning footie on right foot, pt able to reach left)  Toileting: Minimal assistance;Setup(pt needed assist w/wiping bottom, able to wipe front and pull up brief)      Pt in bed upon arrival. Sup to sit to eob for grooming at tray table. Pt had washed up last night before bed and declined at this time. Pt did demon U/LB self care (see above for LOF). Pt stood for mickie care and to change pull up brief then transferred to recliner w/SW. No LOB on RA. Pt retired to recliner and opened containers and setup dinner tray. Call light and phone cell phone in reach. Hospital phone line not working.        Balance  Sitting Balance: Stand by assistance  Standing Balance: Contact guard assistance(w/SW)  Standing Balance  Time: Pt stood for approx 5 min for mickie care and transfer from eob to recliner  Functional Mobility  Functional - Mobility Device: Standard Walker  Assist Level: Contact guard assistance  Functional Mobility Comments: No LOB  Bed mobility  Rolling to Right: Stand by assistance  Supine to Sit: Stand by assistance  Sit to Supine: Unable to assess(left up in chair)  Scooting: Stand by assistance  Transfers  Stand Step Transfers: Contact guard assistance  Sit to stand: Minimal assistance  Stand to sit: Contact guard assistance  Transfer Comments: vc's for hand placement      Plan   Plan  Times per week: 3-4 x/wk  Current Treatment Recommendations: Balance Training, Functional Mobility Training, Endurance Training, Safety Education & Training, Patient/Caregiver Education & Training, Equipment Evaluation, Education, & procurement, Self-Care / ADL    Goals  Short term goals  Time Frame for Short term goals: By discharge, pt will:  Short term goal 1: Demo Mod I with use of LRD for functional transfers and functional mobility  Short term goal 2: Demo I with setup for UB ADLs and grooming tasks  Short term goal 3: Demo I with setup for LB ADLs and toileting tasks  Short term goal 4: Demo 8+ minutes dynamic standing to increase safety and independence with ADLs/IADLs  Short term goal 5: Demo I with V/D 2+ EC/WS techniques to incorporate into daily routine     Therapy Time   Individual Concurrent Group Co-treatment   Time In  1500         Time Out  1600         Minutes  60 total tx time                 [address] LemaA/L

## 2020-09-09 NOTE — PROGRESS NOTES
Pharmacy Note     Renal Dose Adjustment    Char Olmos is a 80 y.o. female. Pharmacist assessment of renally cleared medications. Recent Labs     09/06/20  1402 09/08/20  0525   BUN 17 24*       Recent Labs     09/06/20  1402 09/08/20  0525   CREATININE 0.74 1.02*       Estimated Creatinine Clearance: 41 mL/min (A) (based on SCr of 1.02 mg/dL (H)).     Height:   Ht Readings from Last 1 Encounters:   09/05/20 5' 9\" (1.753 m)     Weight:  Wt Readings from Last 1 Encounters:   09/05/20 154 lb (69.9 kg)       The following medication dose has been adjusted based upon renal function per P&T Guidelines:             Famotidine BID to Daily    Sandra MccormackD BCPS [facility] Hospital[facility]  9/9/2020 1:35 PM

## 2020-09-09 NOTE — PROGRESS NOTES
[facility]  Office: [address], DO, [facility], DO, Juana Rothsay, DO, Audelia Kumars Blood, DO, Rosario Rich MD, Sonal Quintanilla MD, Steve Moreno MD, Florin Cardoso MD, Bree Brian MD, Ricky Davis MD, Marco Dacosta MD, Phan Huang MD, Laura Frankel MD, Shannen Villa, DO, Lelo Robertson MD, Quinton Valencia MD, Alisa Jensen, DO, Adan Washington MD,  Donya Ruiz, DO, Evin Santillan MD, Rolanda Geiger MD, Noble Fay, [facility], [facility], CNP, Gideon Kate, CNP, Michael Martinez, CNS, Krystina French, CNP, Marie Cortez, CNP, Williemae Lundborg, CNP, Jf Johnson, CNP, Sherie Pond, CNP, Marquita Oquendo PA-C, Manny Altamiranos, [facility], [facility], CNP, Fernie Kumari, CNP, Duran Cannon, CNP, Kate Bonner, CNP, Sveta Hernández, [facility]   [address] [facility]    Progress Note    9/9/2020    2:17 PM    Name:   Toni Howell  MRN:     [id number]     Acct:      [de-identified]   Room:   [address] [place] [address] Day:  4  Admit Date:  9/5/2020  1:19 AM    PCP:   Neo Andrade MD  Code Status:  Full Code    Subjective:     C/C: fever  Interval History Status: not changed. Patient was cooperative and comfortable this morning. Complained of frequent urination with Lasix. Brief History:     Toni Howell is a 80 y.o. Non-/non  female who presents with No chief complaint on file. and is admitted to the hospital for the management of COVID-19 virus infection.   This is a pleasant 80 y o female  With known h/o HTN, cognitive impairment, and anxiety and osteoporosis presented to [facility] ER with concerns of fever and fatigue, patient family member are being worked up for COVID-19 and there were concerns of the patient might have COVID-23.  12 in ER came back positive, initial labs in the ER reflected creatinine of 1.08 elevated BNP at 2000 with some elevation of troponin at 32, inflammatory markers were not significantly elevated, patient initial chest x-ray was not remarkable for any acute pathology. He was transferred to our facility for further management  Currently she denies any chest pain, sob, nausea, vomiting, fever or chills   She is hypoxic . Review of Systems:     Review of Systems   Constitutional: Negative for chills, diaphoresis and fever. HENT: Negative for congestion. Eyes: Negative for visual disturbance. Respiratory: Negative for cough, chest tightness, shortness of breath and wheezing. Cardiovascular: Negative for chest pain, palpitations and leg swelling. Gastrointestinal: Negative for abdominal pain, blood in stool, constipation, diarrhea, nausea and vomiting. Genitourinary: Negative for difficulty urinating. Neurological: Negative for dizziness, weakness, light-headedness, numbness and headaches. All other systems reviewed and are negative. Medications:      Allergies:  No Known Allergies    Current Meds:   Scheduled Meds:    [START ON 9/10/2020] famotidine  20 mg Oral Daily    metoprolol succinate  50 mg Oral Daily    donepezil  10 mg Oral Nightly    Vitamin D  2,000 Units Oral Daily    therapeutic multivitamin-minerals  1 tablet Oral Daily    sodium chloride flush  10 mL Intravenous 2 times per day    enoxaparin  30 mg Subcutaneous BID    ARIPiprazole  5 mg Oral Daily    citalopram  20 mg Oral Daily     Continuous Infusions:   PRN Meds: bisacodyl, polyethylene glycol, sodium chloride flush, potassium chloride **OR** potassium alternative oral replacement **OR** potassium chloride, magnesium sulfate, acetaminophen **OR** acetaminophen, promethazine **OR** ondansetron, nicotine, senna    Data:     Past Medical History:   has a past medical history of Anemia, Anxiety, Naranjo disease, Bradycardia, Cataracts, bilateral, Colon polyp, Diverticulosis, Fibroid, Forgetfulness, Gait disturbance, Gall stones, Headache(784.0), Hearing loss, HTN (hypertension), Insomnia, Lichen sclerosus, Osteoporosis, Other activity(E029.9), Pap smear for cervical cancer screening, Pneumonia, Vaginal atrophy, and Vaginal discharge. Social History:   reports that she quit smoking about 46 years ago. She has never used smokeless tobacco. She reports current alcohol use. She reports that she does not use drugs. Family History:   Family History   Problem Relation Age of Onset    Colon Cancer Brother             Diabetes Father     Bipolar Disorder Mother     Osteoporosis Mother    Ju Bud Migraines Mother        Vitals:  BP (!) 166/86   Pulse 72   Temp 99.3 °F (37.4 °C) (Oral)   Resp 18   Ht 5' 9\" (1.753 m)   Wt 154 lb (69.9 kg)   SpO2 94%   BMI 22.74 kg/m²   Temp (24hrs), Av.9 °F (37.2 °C), Min:98.4 °F (36.9 °C), Max:99.3 °F (37.4 °C)    No results for input(s): POCGLU in the last 72 hours. I/O (24Hr):     Intake/Output Summary (Last 24 hours) at 2020 1417  Last data filed at 2020 1600  Gross per 24 hour   Intake 120 ml   Output --   Net 120 ml       Labs:  Hematology:  Recent Labs     20  0848   WBC 3.3* 3.6   RBC 3.39* 3.58*   HGB 10.6* 10.9*   HCT 33.2* 35.3*   MCV 97.9 98.6   MCH 31.3 30.4   MCHC 31.9 30.9   RDW 13.2 13.2   PLT See Reflexed IPF Result See Reflexed IPF Result   MPV NOT REPORTED NOT REPORTED   INR 1.0  --    DDIMER 0.73  --      Chemistry:  Recent Labs     20  0525   *   K 4.8      CO2 20   GLUCOSE 70   BUN 24*   CREATININE 1.02*   ANIONGAP 11   LABGLOM 51*   GFRAA >60   CALCIUM 8.5*     Recent Labs     20  0525   PROT 5.8*   LABALBU 3.1*   AST 50*   ALT 38*   ALKPHOS 50   BILITOT 0.37     ABG:No results found for: POCPH, PHART, PH, POCPCO2, VFS5VQQ, PCO2, POCPO2, PO2ART, PO2, POCHCO3, KPO8CZS, HCO3, NBEA, PBEA, BEART, BE, THGBART, THB, ITD1CDB, ULUL1FFZ, F6VIADOQ, O2SAT, FIO2  Lab Results   Component Value Date/Time    SPECIAL NOT REPORTED 2020 08:31 PM     Lab Results   Component Value Date/Time    CULTURE NO GROWTH 09/04/2020 08:31 PM       Radiology:  Xr Chest Portable    Result Date: 9/4/2020  No acute pulmonary disease. Calcific atherosclerotic disease aorta. Physical Examination:        General appearance: Sleepy but arousable, cooperative and no distress  Mental Status:  oriented to person, place and time and normal affect  Lungs:  clear to auscultation bilaterally, normal effort  Heart:  regular rate and rhythm, no murmur  Abdomen:  soft, nontender, nondistended, normal bowel sounds, no masses, hepatomegaly, splenomegaly  Extremities:  no edema, redness, tenderness in the calves  Skin:  no gross lesions, rashes, induration    Assessment:        Hospital Problems           Last Modified POA    * (Principal) COVID-19 virus infection 9/4/2020 Yes    Essential hypertension 9/4/2020 Yes    Thrombocytopenia ([address] 9/4/2020 Yes    Elevated troponin 9/5/2020 Yes    Elevated brain natriuretic peptide (BNP) level 9/5/2020 Yes          Plan:        COVID-19 infection: Droplet plus plus isolation precautions, positive 9/4/2020. Restart home medication of Toprol XL, hold lisinopril currently for rise in kidney functino . Essential hypertension: Continue home medication. Restart Toprol-XL     Dementia/cognitive impairment: Continue chronic medication     Thrombocytopenia monitor closely: Okay for DVT prophylaxis for now     - ok for discharge from IM standpoint, pending COVID testing for placement.        Bibi Lal DO  9/9/2020  2:17 PM

## 2020-09-10 LAB
CULTURE: NORMAL
CULTURE: NORMAL
Lab: NORMAL
Lab: NORMAL
SPECIMEN DESCRIPTION: NORMAL
SPECIMEN DESCRIPTION: NORMAL

## 2020-09-10 PROCEDURE — 6370000000 HC RX 637 (ALT 250 FOR IP): Performed by: INTERNAL MEDICINE

## 2020-09-10 PROCEDURE — 6360000002 HC RX W HCPCS: Performed by: INTERNAL MEDICINE

## 2020-09-10 PROCEDURE — 2060000000 HC ICU INTERMEDIATE R&B

## 2020-09-10 PROCEDURE — 6370000000 HC RX 637 (ALT 250 FOR IP): Performed by: NURSE PRACTITIONER

## 2020-09-10 PROCEDURE — 2580000003 HC RX 258: Performed by: INTERNAL MEDICINE

## 2020-09-10 PROCEDURE — 99231 SBSQ HOSP IP/OBS SF/LOW 25: CPT | Performed by: INTERNAL MEDICINE

## 2020-09-10 PROCEDURE — 6370000000 HC RX 637 (ALT 250 FOR IP): Performed by: FAMILY MEDICINE

## 2020-09-10 RX ORDER — LORAZEPAM 0.5 MG/1
0.5 TABLET ORAL ONCE
Status: COMPLETED | OUTPATIENT
Start: 2020-09-10 | End: 2020-09-10

## 2020-09-10 RX ORDER — METOPROLOL SUCCINATE 50 MG/1
25 TABLET, EXTENDED RELEASE ORAL ONCE
Status: DISCONTINUED | OUTPATIENT
Start: 2020-09-10 | End: 2020-09-10

## 2020-09-10 RX ORDER — LISINOPRIL 10 MG/1
10 TABLET ORAL DAILY
Status: DISCONTINUED | OUTPATIENT
Start: 2020-09-10 | End: 2020-09-10

## 2020-09-10 RX ORDER — METOPROLOL SUCCINATE 25 MG/1
25 TABLET, EXTENDED RELEASE ORAL DAILY
Qty: 30 TABLET | Refills: 3 | Status: SHIPPED | OUTPATIENT
Start: 2020-09-11 | End: 2020-09-14 | Stop reason: HOSPADM

## 2020-09-10 RX ORDER — METOPROLOL SUCCINATE 50 MG/1
50 TABLET, EXTENDED RELEASE ORAL DAILY
Status: DISCONTINUED | OUTPATIENT
Start: 2020-09-11 | End: 2020-09-10

## 2020-09-10 RX ORDER — METOPROLOL SUCCINATE 25 MG/1
25 TABLET, EXTENDED RELEASE ORAL DAILY
Status: DISCONTINUED | OUTPATIENT
Start: 2020-09-11 | End: 2020-09-12

## 2020-09-10 RX ORDER — LISINOPRIL 20 MG/1
20 TABLET ORAL DAILY
Status: DISCONTINUED | OUTPATIENT
Start: 2020-09-10 | End: 2020-09-11

## 2020-09-10 RX ADMIN — ACETAMINOPHEN 650 MG: 325 TABLET ORAL at 20:59

## 2020-09-10 RX ADMIN — VITAMIN D, TAB 1000IU (100/BT) 2000 UNITS: 25 TAB at 10:09

## 2020-09-10 RX ADMIN — LISINOPRIL 20 MG: 20 TABLET ORAL at 20:02

## 2020-09-10 RX ADMIN — SODIUM CHLORIDE, PRESERVATIVE FREE 10 ML: 5 INJECTION INTRAVENOUS at 20:02

## 2020-09-10 RX ADMIN — DONEPEZIL HYDROCHLORIDE 10 MG: 10 TABLET, FILM COATED ORAL at 20:02

## 2020-09-10 RX ADMIN — ARIPIPRAZOLE 5 MG: 5 TABLET ORAL at 10:09

## 2020-09-10 RX ADMIN — LORAZEPAM 0.5 MG: 0.5 TABLET ORAL at 22:17

## 2020-09-10 RX ADMIN — FAMOTIDINE 20 MG: 20 TABLET, FILM COATED ORAL at 10:09

## 2020-09-10 RX ADMIN — MULTIPLE VITAMINS W/ MINERALS TAB 1 TABLET: TAB at 10:09

## 2020-09-10 RX ADMIN — CITALOPRAM 20 MG: 20 TABLET, FILM COATED ORAL at 10:09

## 2020-09-10 RX ADMIN — ENOXAPARIN SODIUM 30 MG: 30 INJECTION SUBCUTANEOUS at 10:08

## 2020-09-10 RX ADMIN — SODIUM CHLORIDE, PRESERVATIVE FREE 10 ML: 5 INJECTION INTRAVENOUS at 10:09

## 2020-09-10 RX ADMIN — ENOXAPARIN SODIUM 30 MG: 30 INJECTION SUBCUTANEOUS at 20:02

## 2020-09-10 RX ADMIN — METOPROLOL SUCCINATE 25 MG: 25 TABLET, FILM COATED, EXTENDED RELEASE ORAL at 10:09

## 2020-09-10 ASSESSMENT — PAIN SCALES - GENERAL
PAINLEVEL_OUTOF10: 0

## 2020-09-10 ASSESSMENT — ENCOUNTER SYMPTOMS
COUGH: 0
ABDOMINAL PAIN: 0
CONSTIPATION: 0
VOMITING: 0
SHORTNESS OF BREATH: 0
RESPIRATORY NEGATIVE: 1
GASTROINTESTINAL NEGATIVE: 1
BLOOD IN STOOL: 0
NAUSEA: 0
CHEST TIGHTNESS: 0
WHEEZING: 0
DIARRHEA: 0

## 2020-09-10 NOTE — DISCHARGE SUMMARY
[facility]  Office: [address], DO, Sonido Theo, DO, Mariel Pearl, DO, Adela Garrett Blood, DO, Ant Kirkpatrick MD, Mariia Good MD, Reji Hodge MD, Annette Bond MD, Nerissa Rivas MD, Raj Toscano MD, Marianne Lozaon MD, Charles Valadez MD, Laura Garcia MD, Maine Yang DO, Jane Camarena MD, Tara Peters MD, Kelby Bradley DO, Nima Lloyd MD,  Yuliya Phillips DO, Carlos Amaya MD, Steffany Castaneda MD, [facility], [facility], CNP, Saul Yoon, CNP, Jacquie Abel, CNS, Son Delgado, CNP, Dameon Diaz, CNP, Shekhar Martinez, CNP, Sissy Peguero, CNP, Meka Hart, CNP, Edvin Choe PA-C, Jazmin Morales, [facility], Noah Castro, CNP, Diana Vasquez, CNP, Alessandra Polanco, CNP, Sam Borja, [facility], [facility],  [facility]    Discharge Summary     Patient ID: Eileen Foley  :  10/28/1932   MRN: [id number]     ACCOUNT:  [de-identified]   Patient's PCP: Leonardo Larson MD  Admit Date: 2020   Discharge Date: 9/10/2020     Length of Stay: 5  Code Status:  Full Code  Admitting Physician: Kelby Bradley DO  Discharge Physician: Kelby Bradley DO     Active Discharge Diagnoses:     Hospital Problem Lists:  Principal Problem:    COVID-19 virus infection  Active Problems:    Essential hypertension    Thrombocytopenia (HCC)    Elevated troponin    Elevated brain natriuretic peptide (BNP) level  Resolved Problems:    * No resolved hospital problems. *      Admission Condition:  fair     Discharged Condition: fair    Hospital Stay:     Hospital Course:  Eileen Foley is a 80 y.o. female who was admitted for the management of  COVID-19 virus infection , presented to ER with fever    Gordan Cousin a 80 y.o. Non-/non  female who presents with No chief complaint on file.   and is admitted to the hospital for the management of COVID-19 virus infection.   This is a pleasant 80 y o female  With known h/o HTN, cognitive impairment, and anxiety and osteoporosis presented to [facility] ER with concerns of fever and fatigue, patient family member are being worked up for COVID-19 and there were concerns of the patient might have COVID-19.  12 in ER came back positive, initial labs in the ER reflected creatinine of 1.08 elevated BNP at 2000 with some elevation of troponin at 32, inflammatory markers were not significantly elevated, patient initial chest x-ray was not remarkable for any acute pathology. He was transferred to our facility for further management    Tolerated therapy well, requiring no aggressive interventions. Patient was found positive for COVID on 9/4/2020. She did not qualify for Remdesivir. No steroids were given. She remained on room air. Blood pressure was elevated and improved with reinitiation of low-dose Toprol.   Discussed discontinuation of lisinopril until seen by primary care physician    Significant therapeutic interventions:   None    Significant Diagnostic Studies:   Labs / Micro:  CBC:   Lab Results   Component Value Date    WBC 3.6 09/08/2020    RBC 3.58 09/08/2020    HGB 10.9 09/08/2020    HCT 35.3 09/08/2020    MCV 98.6 09/08/2020    MCH 30.4 09/08/2020    MCHC 30.9 09/08/2020    RDW 13.2 09/08/2020    PLT See Reflexed IPF Result 09/08/2020     BMP:    Lab Results   Component Value Date    GLUCOSE 70 09/08/2020     09/08/2020    K 4.8 09/08/2020     09/08/2020    CO2 20 09/08/2020    ANIONGAP 11 09/08/2020    BUN 24 09/08/2020    CREATININE 1.02 09/08/2020    BUNCRER NOT REPORTED 09/08/2020    CALCIUM 8.5 09/08/2020    LABGLOM 51 09/08/2020    GFRAA >60 09/08/2020    GFR      09/08/2020    GFR NOT REPORTED 09/08/2020     CMP:    Lab Results   Component Value Date    GLUCOSE 70 09/08/2020     09/08/2020    K 4.8 09/08/2020     09/08/2020    CO2 20 09/08/2020    BUN 24 09/08/2020    CREATININE 1.02 09/08/2020    ANIONGAP 11 09/08/2020    ALKPHOS 50 09/08/2020    ALT 38 09/08/2020    AST 50 09/08/2020    BILITOT 0.37 09/08/2020    LABALBU 3.1 09/08/2020    ALBUMIN 1.1 09/08/2020    LABGLOM 51 09/08/2020    GFRAA >60 09/08/2020    GFR      09/08/2020    GFR NOT REPORTED 09/08/2020    PROT 5.8 09/08/2020    CALCIUM 8.5 09/08/2020      Radiology:  Xr Chest Portable    Result Date: 9/4/2020  No acute pulmonary disease. Calcific atherosclerotic disease aorta. Consultations:    Consults:     Final Specialist Recommendations/Findings:   IP CONSULT TO INFECTIOUS DISEASES  IP CONSULT TO INFECTIOUS DISEASES      The patient was seen and examined on day of discharge and this discharge summary is in conjunction with any daily progress note from day of discharge. Discharge plan:     [facility]    Physician Follow Up: Your PCP    Schedule an appointment as soon as possible for a visit in 2 weeks  hospital followup       Requiring Further Evaluation/Follow Up POST HOSPITALIZATION/Incidental Findings:   None    Diet: cardiac diet    Activity: As tolerated    Instructions to Patient:   Your primary care physician in 1 to 2 weeks for hospital follow-up, take new medications as prescribed, discontinue lisinopril as her blood pressure has been at goal without it.     Discharge Medications:      Medication List      CHANGE how you take these medications    metoprolol succinate 25 MG extended release tablet  Commonly known as:  TOPROL XL  Take 1 tablet by mouth daily  Start taking on:  September 11, 2020  What changed:    · medication strength  · how much to take        CONTINUE taking these medications    ARIPiprazole 5 MG tablet  Commonly known as:  ABILIFY     Centrum Silver Ultra Womens Tabs     citalopram 20 MG tablet  Commonly known as:  CELEXA     cyanocobalamin 1000 MCG/ML injection     donepezil 5 MG tablet  Commonly known as:  ARICEPT     LORazepam 0.5 MG tablet  Commonly known as:  ATIVAN     MIRALAX PO     omeprazole 10 MG delayed

## 2020-09-10 NOTE — PROGRESS NOTES
[facility] -. Progress Note  COVID 19 Patient  Today's Date and Time: 9/9/2020, 10:17 PM    Impression :   · COVID 19 Suspect  · COVID 19 Confirmed Infection   · COVID-19 tests:  · 9/4/2020 positive  · Congestive heart failure    Recommendations:   · Monitor off antibiotics  · Clinical Research will approach patient to explore if he qualifies for any of the COVID 19 treatment protocols. At the current stage she does not seem to qualify for any therapy  · Remdesivir. Patient does not qualify as she is not requiring any oxygen support  · Suggest continue gentle diuresis      Medical Decision Making/Summary/Discussion:9/9/2020     · Patient admitted with suspected COVID 19 infection  · Covid test confirmed positive. · She has associated congestive heart failure  · Currently no signs of pneumoniae associated with the COVID   · Has shown some improvement with treatment of congestive heart failure. · ID wise no objection to continuing care outside the hospital  Infection Control Recommendations   · Universal Precautions  · Airborne isolation  · Droplet Isolation    Antimicrobial Stewardship Recommendations     · Discontinuation of therapy  Coordination of Outpatient Care:   · Estimated Length of IV antimicrobials:TBD  · Patient will need Midline Catheter Insertion: TBD  · Patient will need PICC line Insertion: No  · Patient will need: Home IV , [place],  SNF,  LTAC:TBD  · Patient will need outpatient wound care:No    Chief complaint/reason for consultation:   · Concern for COVID infection      History of Present Illness:   Theresa Shane is a 80y.o.-year-old  female who was initially admitted on 9/5/2020. Patient seen at the request of Adrian Hernandez. INITIAL HISTORY:    Patient presented through ER with complaints of fatigue, generalized weakness and fevers. The patient denied headache, cough, shortness of breath, expectoration of secretions.   She also denied GI symptoms such as abdominal pain nausea vomiting or diarrhea. The patient presented because she was fearful that her daughter may have COVID and she may have become exposed. The patient lives at her home but is helped by her family because of her age. Remains ambulatory with the help of a walker    In the emergency room her chest x-ray on 9/4/2020 was normal.  It showed atherosclerosis of the aorta but no pulmonary problems other than evidence of old granulomatous disease. White count was 3.0 with a hemoglobin of 10.9 and platelets of 473. Troponin and myoglobin were elevated. Her ferritin was elevated at 283. Her proBNP was elevated at 2186  Her COVID-19 test was positive on 9/4/2020. Patient admitted because of concerns with COVID 19.    CURRENT EVALUATION : 9/9/2020    Patient evaluated and examined in the ICU. Afebrile  VS stable  Doing well overall  Diuresing  Awaiting placement    Patient exhibiting respiratory distress. No  Respiratory secretions: No    Patient receiving supplemental oxygen. 2 L nasal cannula  RR 14-->15  02 sat 97-->100 -->93%      QTc:           NEWS Score: 0-4 Low risk group; 5-6: Medium risk group; 7 or above: High risk group  Parameters 3 2 1 0 1 2 3   Age    < 65   ? 65   RR ? 8  9-11 12-20  21-24 ? 25   O2 Sats ?  91 92-93 94-95 ? 96      Suppl O2  Yes  No      SBP ? 90  101-110 111-219   ? 220   HR ? 40  41-50 51-90  111-130 ? 131   Consciousness    Alert   Drowsiness, lethargy, or confusion   Temperature ? 35.0 C (95.0 F)  35.1-36.0 C 95.1-96.9 F 36.1-38.0 C 97.0-100.4 F 38.1-39.0 C 100.5-102.3 F ? 39.1 C ? 102.4 F      NEWS Score:   9/5/2020: 3 low risk    Overall Daily Picture:      Unchanged    Presence of secondary bacterial Infection:  No   Additional antibiotics: No    Labs, X rays reviewed: 9/9/2020    BUN: 24-->17-->24  Cr: 0.87-->0.74-->1.02    WBC: 3.0--> 2.5-->3.3  Hb: 10.8-->10.6  Plat: 117-->103    Absolute Neutrophils: 1.06  Absolute Lymphocytes: 0.9  Neutrophil/Lymphocyte Ratio: 1.17 low risk    CRP: 3.1  Ferritin: 283  LDH: 185    Pro Calcitonin:  proBNP 2186    Cultures:  Urine:  ·   Blood:  ·   Sputum :  ·   Wound:       CXR:   · 9/4/2020 clear lung fields  CAT:      Discussed with patient, RN, CC, IM.    9/4/2020          Discussed with patient, RN, Dr Dodie Boyd. .    I have personally reviewed the past medical history, past surgical history, medications, social history, and family history, and I have updated the database accordingly. 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  fistulogram negative       Past Surgical  History:     Past Surgical History:   Procedure Laterality Date    BLEPHAROPLASTY      BREAST BIOPSY      CHOLECYSTECTOMY      HYSTERECTOMY      TAVO due to fibroids    JOINT REPLACEMENT  2011    KIDNEY CYST REMOVAL      OTHER SURGICAL HISTORY      thyroid nodule removed    VULVAR/PERINEAL BIOPSY         Medications:      [START ON 9/10/2020] famotidine  20 mg Oral Daily    metoprolol succinate  25 mg Oral Daily    donepezil  10 mg Oral Nightly    Vitamin D  2,000 Units Oral Daily    therapeutic multivitamin-minerals  1 tablet Oral Daily    sodium chloride flush  10 mL Intravenous 2 times per day    enoxaparin  30 mg Subcutaneous BID    ARIPiprazole  5 mg Oral Daily    No conjunctival inflammation. ENT: No sore throat or runny nose. . No hearing loss, tinnitus or vertigo. Cardiovascular: No chest pain or palpitations. No shortness of breath. No BARROS  Lung: No shortness of breath or cough. No sputum production  Abdomen: No nausea, vomiting, diarrhea, or abdominal pain. Rohit Eliseo No cramps. Genitourinary: No increased urinary frequency, or dysuria. No hematuria. No suprapubic or CVA pain  Musculoskeletal: No muscle aches or pains. No joint effusions, swelling or deformities  Hematologic: No bleeding or bruising. Neurologic: No headache, weakness, numbness, or tingling. Integument: No rash, no ulcers. Psychiatric: No depression. Endocrine: No polyuria, no polydipsia, no polyphagia. Physical Examination :     Patient Vitals for the past 8 hrs:   BP Temp Temp src Pulse Resp   09/09/20 2045 123/75 98.2 °F (36.8 °C) Oral 81 14     General Appearance: Awake, alert, and in no apparent distress. Elderly woman  Head:  Normocephalic, no trauma  Eyes: Pupils equal, round, reactive to light and accommodation; extraocular movements intact; sclera anicteric; conjunctivae pink. No embolic phenomena. ENT: Oropharynx clear, without erythema, exudate, or thrush. No tenderness of sinuses. Mouth/throat: mucosa pink and moist. No lesions. Dentition in good repair. Neck:Supple, without lymphadenopathy. Thyroid normal, No bruits. Pulmonary/Chest: Clear to auscultation, without wheezes, rales, or rhonchi. Cardiovascular: Regular rate and rhythm without murmurs, rubs, or gallops. Abdomen: Soft, non tender. Bowel sounds normal. No organomegaly  All four Extremities: No cyanosis, clubbing, edema, or effusions. Neurologic: No gross sensory or motor deficits. Skin: Warm and dry with good turgor. Signs of peripheral arterial insufficiency. No ulcerations. No open wounds.     Medical Decision Making -Laboratory:   I have independently reviewed/ordered the following labs:    CBC with Differential: Recent Labs     09/08/20  0848   WBC 3.6   HGB 10.9*   HCT 35.3*   PLT See Reflexed IPF Result   LYMPHOPCT 30   MONOPCT 11     BMP:   Recent Labs     09/08/20  0525   *   K 4.8      CO2 20   BUN 24*   CREATININE 1.02*     Hepatic Function Panel:   Recent Labs     09/08/20  0525   PROT 5.8*   LABALBU 3.1*   BILITOT 0.37   ALKPHOS 50   ALT 38*   AST 50*     No results for input(s): RPR in the last 72 hours. No results for input(s): HIV in the last 72 hours. No results for input(s): BC in the last 72 hours. Lab Results   Component Value Date    MUCUS NOT REPORTED 04/12/2018    RBC 3.58 09/08/2020    TRICHOMONAS NOT REPORTED 04/12/2018    WBC 3.6 09/08/2020    YEAST NOT REPORTED 04/12/2018    TURBIDITY CLEAR 09/04/2020     Lab Results   Component Value Date    CREATININE 1.02 09/08/2020    GLUCOSE 70 09/08/2020       Medical Decision Making-Imaging:     EXAMINATION:    ONE XRAY VIEW OF THE CHEST         9/4/2020 6:48 pm         COMPARISON:    Chest 04/16/2016         HISTORY:    ORDERING SYSTEM PROVIDED HISTORY: SOB    TECHNOLOGIST PROVIDED HISTORY:    SOB    Reason for Exam: Fatigue, fever    Acuity: Acute    Type of Exam: Initial         FINDINGS:    Calcifications involving the aorta reflect atherosclerosis. The    cardiomediastinal and hilar silhouettes appear otherwise unremarkable. Sequela from old granulomatous disease.  No focal infiltrate or pleural    effusion evident.  No pneumothorax is seen.  No acute osseous abnormality is    identified.              Impression    No acute pulmonary disease.         Calcific atherosclerotic disease aorta.               Medical Decision [id number]:       Medical Decision Making-Other:     Note:  · Labs, medications, radiologic studies were reviewed with personal review of films  · Large amounts of data were reviewed  · Discussed with nursing Staff, Discharge planner  · Infection Control and Prevention measures reviewed  · All prior entries were

## 2020-09-10 NOTE — PROGRESS NOTES
CLINICAL PHARMACY NOTE: MEDS TO [address] Select Patient?: No  Total # of Prescriptions Filled: 1   The following medications were delivered to the patient:  · See notes  Total # of Interventions Completed: 1  Time Spent (min): 45    Additional Documentation:Medication returned to stock, tried to deliver and RN said patient was going to SNF.

## 2020-09-10 NOTE — DISCHARGE INSTR - COC
N17.9    Thrombocytopenia (HCC) D69.6    Elevated troponin R79.89    Elevated brain natriuretic peptide (BNP) level R79.89       Isolation/Infection:   Isolation          Droplet Plus        Patient Infection Status     Infection Onset Added Last Indicated Last Indicated By Review Planned Expiration Resolved Resolved By    COVID-19 09/04/20 09/04/20 09/08/20 COVID-19 09/16/20 09/22/20      MDRO (multi-drug resistant organism)  04/16/18 04/16/18 Nyla Calixto RN        E. Coli - urine 4/2018      Resolved    COVID-19 Rule Out 09/08/20 09/08/20 09/08/20 COVID-19 (Ordered)   09/09/20 Rule-Out Test Resulted    COVID-19 Rule Out 09/04/20 09/04/20 09/04/20 COVID-19 (Ordered)   09/04/20 Rule-Out Test Resulted          Nurse Assessment:  Last Vital Signs: BP (!) 107/95   Pulse 91   Temp 97.8 °F (36.6 °C) (Oral)   Resp 20   Ht 5' 9\" (1.753 m)   Wt 143 lb (64.9 kg)   SpO2 94%   BMI 21.12 kg/m²     Last documented pain score (0-10 scale): Pain Level: 0  Last Weight:   Wt Readings from Last 1 Encounters:   09/13/20 143 lb (64.9 kg)     Mental Status:  oriented, alert and forgetful at times    IV Access:  - None    Nursing Mobility/ADLs:  Walking   Assisted, standby with standard walker  Transfer  Assisted, standby  Bathing  Assisted, set up, minimal assist  Dressing  Assisted, minimal assist  [address] [facility] Delivery   whole    Wound Care Documentation and Therapy:        Elimination:  Continence:   · Bowel: Yes  · Bladder: Yes  Urinary Catheter: None   Colostomy/Ileostomy/Ileal Conduit: No       Date of Last BM: 09/11/2020    Intake/Output Summary (Last 24 hours) at 9/14/2020 1016  Last data filed at 9/14/2020 0800  Gross per 24 hour   Intake 2978.15 ml   Output --   Net 2978.15 ml     I/O last 3 completed shifts: In: 2728.2 [P.O.:480; I.V.:2248.2]  Out: -     Safety Concerns:      At Risk for Falls    Impairments/Disabilities:      Vision and Hearing - wears glasses and is hard of hearing    Nutrition Therapy:  Current Nutrition Therapy:   - Oral Diet:  General    Routes of Feeding: Oral  Liquids: No Restrictions  Daily Fluid Restriction: no  Last Modified Barium Swallow with Video (Video Swallowing Test): not done    Treatments at the Time of Hospital Discharge:   Respiratory Treatments: None  Oxygen Therapy:  Wears O2 at 2LPM via nasal cannula. Ventilator:    - No ventilator support    Rehab Therapies: Physical Therapy and Occupational Therapy  Weight Bearing Status/Restrictions: No weight bearing restirctions  Other Medical Equipment (for information only, NOT a DME order):  walker and bedside commode  Other Treatments: None    Patient's personal belongings (please select all that are sent with patient):  Glasses, Dentures Full Upper and Partial Lower, cell phone with charging cable    RN SIGNATURE:  Electronically signed by Nani Lopes on 9/11/20 at 1:59 PM EDT    CASE MANAGEMENT/SOCIAL WORK SECTION    Inpatient Status Date: 9-5    Readmission Risk Assessment Score:  Readmission Risk              Risk of Unplanned Readmission:        21           Discharging to Facility/ Agency   · Name:  Address:  [address] [place] [address]       Phone: [phone]       Fax: [phone]        ·   · Phone:  · Fax:    Dialysis Facility (if applicable)   · Name:  · Address:  · Dialysis Schedule:  · Phone:  · Fax:    Electronically signed by Batool Gregg RN on 9/14/2020 at 10:16 AM    / signature: Electronically signed by Phylicia Nunn RN on 9/11/20 at 1:43 PM EDT    PHYSICIAN SECTION    Prognosis: Fair    Condition at Discharge: Stable    Rehab Potential (if transferring to Rehab): Fair    Recommended Labs or Other Treatments After Discharge:    Follow-up with primary care physician in 1 to 2 weeks for hospital follow-up    Physician Certification: I certify the above information and transfer of Elder Barthel

## 2020-09-10 NOTE — PROGRESS NOTES
Examination:        General appearance: Sleepy but arousable, cooperative and no distress  Mental Status:  oriented to person, place and time and normal affect  Lungs:  clear to auscultation bilaterally, normal effort  Heart:  regular rate and rhythm, no murmur  Abdomen:  soft, nontender, nondistended, normal bowel sounds, no masses, hepatomegaly, splenomegaly  Extremities:  no edema, redness, tenderness in the calves  Skin:  no gross lesions, rashes, induration    Assessment:        Hospital Problems           Last Modified POA    * (Principal) COVID-19 virus infection 9/4/2020 Yes    Essential hypertension 9/4/2020 Yes    Thrombocytopenia ([facility] [address] 9/4/2020 Yes    Elevated troponin 9/5/2020 Yes    Elevated brain natriuretic peptide (BNP) level 9/5/2020 Yes          Plan:        COVID-19 infection: Droplet plus plus isolation precautions, positive 9/4/2020. Restart home medication of Toprol XL, pressure well controlled today, hold lisinopril. Essential hypertension: Continue home medication.   Restart Toprol-XL     Dementia/cognitive impairment: Continue chronic medication     Thrombocytopenia monitor closely: Okay for DVT prophylaxis for now    -Discharge today pending placement and approval    Bruce Campos DO  9/10/2020  11:16 AM

## 2020-09-10 NOTE — CARE COORDINATION
Care Transition  [address] at [address] intake, discussed patient clinically. They will review and let writer know when they can accept. Faxed Medical necessity to [id number] [facility]. Called [facility] and spoke with [facility] for 1600 will call wheelchair .     1351 Received call from Tete Luna at [facility] they cannot accept due to her low pulse ox of 88% at 0400. Her pulse ox has to remain above 93% for 24 hours. [id number] [phone] Called daughter and discussed COVID choices. She does not want her mom to go to [facility]. She Will reivew [facility] and let case management know if okay to send the referral there. 0 Called daughter and she does not want [facility]. She only wants [facility]. Stacey 79 at [place] [facility] Centralized Intake to let her know they are the daughter's only choice.

## 2020-09-10 NOTE — CARE COORDINATION
9/10/20 nHpredict tool uploaded to chart and can be viewed in the media tab    nH Predict Outcome report for Xavier Pineda ,  10/28/1932 . Predict with equal gains, however she lives alone and was independent , would  recommend SNF with expected length of stay of  14.3days, and projected CG hours of  2   post stay. She  would benefit from daily skilled nursing and therapy to increase strength and functional independence.            VELASQUEZ Vitale, RN  [facility]  M: [phone]        Isak Haas is Guiding the [employer], insights and analysis from the experts transforming health care  [facility] Essential Insights

## 2020-09-10 NOTE — PROGRESS NOTES
[facility]  Progress Note    Siva Whitaker  MRN: [id number]  Date: 9/10/2020  LOS: 5     Reason for F/U :   COVID-19 virus infection    Impression :   1. COVID-19 virus infection  2. Congestive heart failure    Recommendations:   · The patient has done well clinically and did not qualify for therapy with Remdesivir  · She remains on room air and is saturating well  · There was a plan for discharge earlier today but the patient did have a brief bout of hypoxia  · Continue supportive care and the patient is okay for discharge from an infectious disease standpoint    Infection Control Recommendations:   Droplet plus precautions    Discharge Planning:   Patient will need Midline Catheter Insertion/ PICC line Insertion: No  Patient will need: Home IV , [place],  SNF,  LTAC: Undetermined  Patient willneed outpatient wound care: No    Medical Decision making / Summary of Stay:   Siva Whitaker is a 80y.o.-year-old  female who was initially admitted on 9/5/2020. Patient seen at the request of .     INITIAL HISTORY:     Patient presented through ER with complaints of fatigue, generalized weakness and fevers. The patient denied headache, cough, shortness of breath, expectoration of secretions. She also denied GI symptoms such as abdominal pain nausea vomiting or diarrhea.     The patient presented because she was fearful that her daughter may have COVID and she may have become exposed. The patient lives at her home but is helped by her family because of her age. Remains ambulatory with the help of a walker     In the emergency room her chest x-ray on 9/4/2020 was normal.  It showed atherosclerosis of the aorta but no pulmonary problems other than evidence of old granulomatous disease.     White count was 3.0 with a hemoglobin of 10.9 and platelets of 376. Troponin and myoglobin were elevated. Her ferritin was elevated at 283.   Her proBNP was elevated at 2186  Her COVID-19 test was positive on 2020.     Patient admitted because of concerns with COVID 19    Current evaluation:9/10/2020    /68   Pulse 56   Temp 98.1 °F (36.7 °C) (Oral)   Resp 15   Ht 5' 9\" (1.753 m)   Wt 149 lb (67.6 kg)   SpO2 95%   BMI 22.00 kg/m²     Temperature Range: Temp: 98.1 °F (36.7 °C) Temp  Av.3 °F (36.8 °C)  Min: 98.1 °F (36.7 °C)  Max: 98.7 °F (37.1 °C)  The patient is seen and evaluated at bedside she is awake and alert in no acute distress. No significant cough or shortness of breath. No abdominal pain nausea vomiting or diarrhea. She is saturating on room air at 100%. Review of Systems   Constitutional: Negative. Respiratory: Negative. Cardiovascular: Negative. Gastrointestinal: Negative. Genitourinary: Negative. Musculoskeletal: Negative. Skin: Negative. Neurological: Negative. Psychiatric/Behavioral: Negative. Physical Examination :     Physical Exam  Constitutional:       Appearance: She is well-developed. HENT:      Head: Normocephalic and atraumatic. Neck:      Musculoskeletal: Normal range of motion and neck supple. Cardiovascular:      Rate and Rhythm: Regular rhythm. Heart sounds: Normal heart sounds. Pulmonary:      Effort: Pulmonary effort is normal.      Breath sounds: Normal breath sounds. Abdominal:      General: Bowel sounds are normal.      Palpations: Abdomen is soft. Skin:     General: Skin is warm and dry. Neurological:      Mental Status: She is alert and oriented to person, place, and time.          Laboratory data:   I have independently reviewed the followinglabs:  CBC with Differential:   Recent Labs     20  0848   WBC 3.6   HGB 10.9*   HCT 35.3*   PLT See Reflexed IPF Result   LYMPHOPCT 30   MONOPCT 11     BMP:   Recent Labs     20  0525   *   K 4.8      CO2 20   BUN 24*   CREATININE 1.02*     Hepatic Function Panel:   Recent Labs     20  0525   PROT 5.8*   LABALBU 3.1* BILITOT 0.37   ALKPHOS 50   ALT 38*   AST 50*         Lab Results   Component Value Date    PROCAL 0.07 09/04/2020     Lab Results   Component Value Date    CRP 3.1 09/04/2020     No results found for: SEDRATE      Lab Results   Component Value Date    DDIMER 0.73 09/06/2020    DDIMER 1.15 09/05/2020     Lab Results   Component Value Date    FERRITIN 283 09/04/2020     Lab Results   Component Value Date     09/04/2020     Lab Results   Component Value Date    FIBRINOGEN 227 09/06/2020    FIBRINOGEN 169 09/05/2020       Results in Past 30 Days  Result Component Current Result Ref Range Previous Result Ref Range   SARS-CoV-2 DETECTED (A) (9/8/2020) Not Detected      (9/4/2020)          (9/8/2020)       (9/4/2020)          (9/8/2020)  DETECTED (A) (9/4/2020) Not Detected     Lab Results   Component Value Date    COVID19 DETECTED 09/08/2020    COVID19 DETECTED 09/04/2020       No results for input(s): [facility] in the last 72 hours. Imaging Studies:   No new imaging    Cultures:   None    Medications:      famotidine  20 mg Oral Daily    metoprolol succinate  25 mg Oral Daily    donepezil  10 mg Oral Nightly    Vitamin D  2,000 Units Oral Daily    therapeutic multivitamin-minerals  1 tablet Oral Daily    sodium chloride flush  10 mL Intravenous 2 times per day    enoxaparin  30 mg Subcutaneous BID    ARIPiprazole  5 mg Oral Daily    citalopram  20 mg Oral Daily           [facility]  Kassi Lainez MD  Perfect Serve messaging  OFFICE: [phone]      Electronically signed by Kassi Lainez MD on 9/10/2020 at 3:55 PM  Thank you for allowing us to participate in the care of this patient. Please call with questions.     This note iscreated with the assistance of a speech recognition program.  While intending to generate a document that actually reflects the content of the visit, the document can still have some errors including those of syntax andsound a like

## 2020-09-11 PROCEDURE — 6370000000 HC RX 637 (ALT 250 FOR IP): Performed by: INTERNAL MEDICINE

## 2020-09-11 PROCEDURE — 2060000000 HC ICU INTERMEDIATE R&B

## 2020-09-11 PROCEDURE — 94761 N-INVAS EAR/PLS OXIMETRY MLT: CPT

## 2020-09-11 PROCEDURE — 99231 SBSQ HOSP IP/OBS SF/LOW 25: CPT | Performed by: INTERNAL MEDICINE

## 2020-09-11 PROCEDURE — 97535 SELF CARE MNGMENT TRAINING: CPT

## 2020-09-11 PROCEDURE — 97116 GAIT TRAINING THERAPY: CPT

## 2020-09-11 PROCEDURE — 2580000003 HC RX 258: Performed by: INTERNAL MEDICINE

## 2020-09-11 PROCEDURE — 97530 THERAPEUTIC ACTIVITIES: CPT

## 2020-09-11 PROCEDURE — 6370000000 HC RX 637 (ALT 250 FOR IP): Performed by: FAMILY MEDICINE

## 2020-09-11 PROCEDURE — 97110 THERAPEUTIC EXERCISES: CPT

## 2020-09-11 PROCEDURE — 6370000000 HC RX 637 (ALT 250 FOR IP): Performed by: NURSE PRACTITIONER

## 2020-09-11 PROCEDURE — 6360000002 HC RX W HCPCS: Performed by: INTERNAL MEDICINE

## 2020-09-11 RX ORDER — LISINOPRIL 10 MG/1
10 TABLET ORAL DAILY
Qty: 30 TABLET | Refills: 3 | Status: SHIPPED | OUTPATIENT
Start: 2020-09-12 | End: 2020-09-14

## 2020-09-11 RX ORDER — GUAIFENESIN DEXTROMETHORPHAN HYDROBROMIDE ORAL SOLUTION 10; 100 MG/5ML; MG/5ML
10 SOLUTION ORAL EVERY 4 HOURS PRN
Status: DISCONTINUED | OUTPATIENT
Start: 2020-09-11 | End: 2020-09-14 | Stop reason: HOSPADM

## 2020-09-11 RX ORDER — UREA 10 %
3 LOTION (ML) TOPICAL NIGHTLY PRN
Status: DISCONTINUED | OUTPATIENT
Start: 2020-09-11 | End: 2020-09-14 | Stop reason: HOSPADM

## 2020-09-11 RX ORDER — LISINOPRIL 10 MG/1
10 TABLET ORAL DAILY
Status: DISCONTINUED | OUTPATIENT
Start: 2020-09-11 | End: 2020-09-12

## 2020-09-11 RX ADMIN — SODIUM CHLORIDE, PRESERVATIVE FREE 10 ML: 5 INJECTION INTRAVENOUS at 20:37

## 2020-09-11 RX ADMIN — CITALOPRAM 20 MG: 20 TABLET, FILM COATED ORAL at 09:21

## 2020-09-11 RX ADMIN — DONEPEZIL HYDROCHLORIDE 10 MG: 10 TABLET, FILM COATED ORAL at 20:37

## 2020-09-11 RX ADMIN — Medication 10 ML: at 10:39

## 2020-09-11 RX ADMIN — Medication 10 ML: at 15:30

## 2020-09-11 RX ADMIN — LISINOPRIL 10 MG: 20 TABLET ORAL at 10:45

## 2020-09-11 RX ADMIN — VITAMIN D, TAB 1000IU (100/BT) 2000 UNITS: 25 TAB at 09:21

## 2020-09-11 RX ADMIN — SENNOSIDES 8.6 MG: 8.6 TABLET, FILM COATED ORAL at 10:45

## 2020-09-11 RX ADMIN — SODIUM CHLORIDE, PRESERVATIVE FREE 10 ML: 5 INJECTION INTRAVENOUS at 09:21

## 2020-09-11 RX ADMIN — FAMOTIDINE 20 MG: 20 TABLET, FILM COATED ORAL at 09:21

## 2020-09-11 RX ADMIN — ARIPIPRAZOLE 5 MG: 5 TABLET ORAL at 09:21

## 2020-09-11 RX ADMIN — ENOXAPARIN SODIUM 30 MG: 30 INJECTION SUBCUTANEOUS at 20:37

## 2020-09-11 RX ADMIN — MULTIPLE VITAMINS W/ MINERALS TAB 1 TABLET: TAB at 09:21

## 2020-09-11 RX ADMIN — ENOXAPARIN SODIUM 30 MG: 30 INJECTION SUBCUTANEOUS at 09:21

## 2020-09-11 RX ADMIN — Medication 3 MG: at 23:08

## 2020-09-11 ASSESSMENT — ENCOUNTER SYMPTOMS
WHEEZING: 0
CONSTIPATION: 0
VOMITING: 0
DIARRHEA: 0
CHEST TIGHTNESS: 0
GASTROINTESTINAL NEGATIVE: 1
ABDOMINAL PAIN: 0
BLOOD IN STOOL: 0
COUGH: 0
COUGH: 1
NAUSEA: 0
SHORTNESS OF BREATH: 0

## 2020-09-11 ASSESSMENT — PAIN SCALES - GENERAL
PAINLEVEL_OUTOF10: 0

## 2020-09-11 NOTE — PROGRESS NOTES
[facility]  Office: [address], DO, [facility], DO, [place], DO, Audelia Kumars Blood, DO, Rosario Rich MD, Sonal Quintanilla MD, Steve Moreno MD, Florin Cardoso MD, Bree Brian MD, Ricky Davis MD, Marco Dacosta MD, Phan Huang MD, Laura Frankel MD, Shannen Villa, DO, Lelo Robertson MD, Quinton Valencia MD, Alisa Jensen, DO, Adan Washington MD,  Donya Ruiz, DO, Evin Santillan MD, Rolanda Geiger MD, Noble Fay, [facility], [facility], CNP, Gideon Kate, CNP, Michael Martinez, CNS, Krystina French, CNP, Marie Cortez, CNP, Williemae Lundborg, CNP, Jf Johnson, CNP, Sherie Pond, CNP, Marquita Oquendo PA-C, Manny Altamiranos, [facility], Yuan Room, CNP, Fernie Kumari, CNP, Duran Cannon, CNP, Kate Bonner, CNP, Sveta Hernández, [address] [place] [address]    Progress Note    9/11/2020    9:14 AM    Name:   Toni Howell  MRN:     [id number]     Acct:      [de-identified]   Room:   [facility]/3009-01   Day:  6  Admit Date:  9/5/2020  1:19 AM    PCP:   Neo Andrade MD  Code Status:  Full Code    Subjective:     C/C: fever  Interval History Status: not changed. Patient had a panic attack last night, was given 0.5mg ativan. Very calm this morning and eager for discharge. Brief History:     Toni Howell is a 80 y.o. Non-/non  female who presents with No chief complaint on file. and is admitted to the hospital for the management of COVID-19 virus infection.   This is a pleasant 80 y o female  With known h/o HTN, cognitive impairment, and anxiety and osteoporosis presented to [facility] ER with concerns of fever and fatigue, patient family member are being worked up for COVID-19 and there were concerns of the patient might have [phone].  12 in ER came back positive, initial labs in the ER reflected creatinine of 1.08 elevated BNP at 2000 with some elevation of troponin at 32, inflammatory markers were not significantly elevated, patient initial chest x-ray was not remarkable for any acute pathology. He was transferred to our facility for further management  Currently she denies any chest pain, sob, nausea, vomiting, fever or chills   She is hypoxic . Review of Systems:     Review of Systems   Constitutional: Negative for chills, diaphoresis and fever. HENT: Negative for congestion. Eyes: Negative for visual disturbance. Respiratory: Negative for cough, chest tightness, shortness of breath and wheezing. Cardiovascular: Negative for chest pain, palpitations and leg swelling. Gastrointestinal: Negative for abdominal pain, blood in stool, constipation, diarrhea, nausea and vomiting. Genitourinary: Negative for difficulty urinating. Neurological: Negative for dizziness, weakness, light-headedness, numbness and headaches. All other systems reviewed and are negative. Medications:      Allergies:  No Known Allergies    Current Meds:   Scheduled Meds:    lisinopril  10 mg Oral Daily    metoprolol succinate  25 mg Oral Daily    famotidine  20 mg Oral Daily    donepezil  10 mg Oral Nightly    Vitamin D  2,000 Units Oral Daily    therapeutic multivitamin-minerals  1 tablet Oral Daily    sodium chloride flush  10 mL Intravenous 2 times per day    enoxaparin  30 mg Subcutaneous BID    ARIPiprazole  5 mg Oral Daily    citalopram  20 mg Oral Daily     Continuous Infusions:   PRN Meds: bisacodyl, polyethylene glycol, sodium chloride flush, potassium chloride **OR** potassium alternative oral replacement **OR** potassium chloride, magnesium sulfate, acetaminophen **OR** acetaminophen, promethazine **OR** ondansetron, nicotine, senna    Data:     Past Medical History:   has a past medical history of Anemia, Anxiety, Naranjo disease, Bradycardia, Cataracts, bilateral, Colon polyp, Diverticulosis, Fibroid, Forgetfulness, Gait disturbance, Gall stones, Headache(784.0), Hearing loss, HTN (hypertension), Insomnia, Lichen sclerosus, Osteoporosis, Other activity(E029.9), Pap smear for cervical cancer screening, Pneumonia, Vaginal atrophy, and Vaginal discharge. Social History:   reports that she quit smoking about 46 years ago. She has never used smokeless tobacco. She reports current alcohol use. She reports that she does not use drugs. Family History:   Family History   Problem Relation Age of Onset    Colon Cancer Brother             Diabetes Father     Bipolar Disorder Mother     Osteoporosis Mother    [facility] Migraines Mother        Vitals:  /65   Pulse 51   Temp 99.2 °F (37.3 °C) (Oral)   Resp 15   Ht 5' 9\" (1.753 m)   Wt 149 lb (67.6 kg)   SpO2 96%   BMI 22.00 kg/m²   Temp (24hrs), Av.7 °F (37.1 °C), Min:98.1 °F (36.7 °C), Max:99.2 °F (37.3 °C)    No results for input(s): POCGLU in the last 72 hours. I/O (24Hr): Intake/Output Summary (Last 24 hours) at 2020 0914  Last data filed at 9/10/2020 1900  Gross per 24 hour   Intake 1210 ml   Output --   Net 1210 ml       Labs:  Hematology:  No results for input(s): WBC, RBC, HGB, HCT, MCV, MCH, MCHC, RDW, PLT, MPV, SEDRATE, CRP, INR, DDIMER, EL0CWOMN, LABABSO in the last 72 hours. Invalid input(s): PT  Chemistry:  No results for input(s): NA, K, CL, CO2, GLUCOSE, BUN, CREATININE, MG, ANIONGAP, LABGLOM, GFRAA, CALCIUM, CAION, PHOS, PSA, PROBNP, TROPHS, CKTOTAL, CKMB, CKMBINDEX, MYOGLOBIN, DIGOXIN, LACTACIDWB in the last 72 hours. No results for input(s): PROT, LABALBU, LABA1C, F6GAEEY, D7DFKWX, FT4, TSH, AST, ALT, LDH, GGT, ALKPHOS, LABGGT, BILITOT, BILIDIR, AMMONIA, AMYLASE, LIPASE, LACTATE, CHOL, HDL, LDLCHOLESTEROL, CHOLHDLRATIO, TRIG, VLDL, IAT66NG, PHENYTOIN, PHENYF, URICACID, POCGLU in the last 72 hours.   ABG:No results found for: POCPH, PHART, PH, POCPCO2, WON4MAV, PCO2, POCPO2, PO2ART, PO2, POCHCO3, NYY9ERX, HCO3, NBEA, PBEA, BEART, BE, THGBART, THB, TWL5ZWQ, DIQM1NJU, E3QRJPTJ, O2SAT, FIO2  Lab Results Component Value Date/Time    SPECIAL NOT REPORTED 09/04/2020 08:31 PM     Lab Results   Component Value Date/Time    CULTURE NO GROWTH 09/04/2020 08:31 PM       Radiology:  Xr Chest Portable    Result Date: 9/4/2020  No acute pulmonary disease. Calcific atherosclerotic disease aorta. Physical Examination:        General appearance: Sleepy but arousable, cooperative and no distress  Mental Status:  oriented to person, place and time and normal affect  Lungs:  clear to auscultation bilaterally, normal effort  Heart:  regular rate and rhythm, no murmur  Abdomen:  soft, nontender, nondistended, normal bowel sounds, no masses, hepatomegaly, splenomegaly  Extremities:  no edema, redness, tenderness in the calves  Skin:  no gross lesions, rashes, induration    Assessment:        Hospital Problems           Last Modified POA    * (Principal) COVID-19 virus infection 9/4/2020 Yes    Essential hypertension 9/4/2020 Yes    Thrombocytopenia ([address] 9/4/2020 Yes    Elevated troponin 9/5/2020 Yes    Elevated brain natriuretic peptide (BNP) level 9/5/2020 Yes          Plan:        COVID-19 infection: Droplet plus plus isolation precautions, positive 9/4/2020. Restart home medication of Toprol XL, pressure well controlled today, hold lisinopril. Essential hypertension: Continue home medication. Restart Toprol-XL     Dementia/cognitive impairment: Continue chronic medication     Thrombocytopenia monitor closely: Okay for DVT prophylaxis for now    -She is still cleared for discharge, blood pressure is better controlled, no hypoxia overnight.   Decrease lisinopril today to 10 mg    Kelby Bradley DO  9/11/2020  9:14 AM

## 2020-09-11 NOTE — DISCHARGE SUMMARY
[facility]  Office: [address] [facility], DO, Keven Barcenas, DO, Julio Simpson, DO, Serg Baires Saman, DO, Key Puente MD, Beatris Davis MD, Stefany Cortez MD, Magaly Liu MD, Sherry Barraza MD, Gay Lugo MD, Beto Linares MD, Pedro Ordoñez MD, Laura Morgan MD, Luci Vidales, DO, Ankita Chester MD, Kavita Diaz MD, Ritu Raymundo, , Florencio Verde MD,  Emily Pozo DO, Tiffany Underwood MD, Oren Collazo MD, Jessica Phillips, [facility], [facility], CNP, Cris Gauthier, CNP, sAad Velasquez, CNS, Elio Anderson, CNP, Marianna Lefort, CNP, Fan Andres, CNP, Earline Torres, CNP, Luz Salas, CNP, Sia Watts PA-C, Sue Mckay, [facility], Celso Murillo, CNP, Andrea Moore, CNP, Carly Craft, CNP, Oxana Lowery, CNP, Fatuma Peraza, [facility]    Discharge Summary     Patient ID: Earlene Rushing  :  10/28/1932   MRN: [id number]     ACCOUNT:  [de-identified]   Patient's PCP: Sincere Burton MD  Admit Date: 2020   Discharge Date: 2020  Length of Stay: 9  Code Status:  Full Code  Admitting Physician: Ritu Raymundo DO  Discharge Physician: Ritu Raymundo DO     Active Discharge Diagnoses:     Hospital Problem Lists:  Principal Problem:    COVID-19 virus infection  Active Problems:    Essential hypertension    Thrombocytopenia (HCC)    Elevated troponin    Elevated brain natriuretic peptide (BNP) level  Resolved Problems:    * No resolved hospital problems. *      Admission Condition:  fair     Discharged Condition: fair    Hospital Stay:     Hospital Course:  Earlene Rushing is a 80 y.o. female who was admitted for the management of  COVID-19 virus infection , presented to ER with fever    Casimer Mireya a 80 y.o. Non-/non  female who presents with No chief complaint on file.   and is admitted to the hospital for the management of COVID-19 virus infection.   This is a pleasant 80 y o female  With known h/o HTN, cognitive impairment, and anxiety and osteoporosis presented to [facility] ER with concerns of fever and fatigue, patient family member are being worked up for COVID-19 and there were concerns of the patient might have COVID-19.  12 in ER came back positive, initial labs in the ER reflected creatinine of 1.08 elevated BNP at 2000 with some elevation of troponin at 32, inflammatory markers were not significantly elevated, patient initial chest x-ray was not remarkable for any acute pathology. He was transferred to our facility for further management    Tolerated therapy well, requiring no aggressive interventions. Patient was found positive for COVID on 9/4/2020. She did not qualify for Remdesivir. No steroids were given. On day of discharge blood pressures began increased and home medications were restarted. Noted bradycardia this morning and Toprol-XL will be stopped until seen by primary care physician. Discharge today to [address] [place] [address] Drive, will dc on just lisinopril 10mg daily. Family in agreement.      Significant therapeutic interventions:   None    Significant Diagnostic Studies:   Labs / Micro:  CBC:   Lab Results   Component Value Date    WBC 2.7 09/12/2020    RBC 3.46 09/12/2020    HGB 10.9 09/12/2020    HCT 34.5 09/12/2020    MCV 99.7 09/12/2020    MCH 31.5 09/12/2020    MCHC 31.6 09/12/2020    RDW 13.1 09/12/2020    PLT See Reflexed IPF Result 09/12/2020     BMP:    Lab Results   Component Value Date    GLUCOSE 168 09/12/2020     09/12/2020    K 4.3 09/12/2020     09/12/2020    CO2 28 09/12/2020    ANIONGAP 11 09/12/2020    BUN 24 09/12/2020    CREATININE 0.82 09/12/2020    BUNCRER NOT REPORTED 09/12/2020    CALCIUM 8.2 09/12/2020    LABGLOM >60 09/12/2020    GFRAA >60 09/12/2020    GFR      09/12/2020    GFR NOT REPORTED 09/12/2020     CMP:    Lab Results   Component Value Date    GLUCOSE 168 09/12/2020     09/12/2020    K 4.3 09/12/2020     09/12/2020    CO2 tablet  Commonly known as:  PRINIVIL;ZESTRIL     metoprolol succinate 50 MG extended release tablet  Commonly known as:  TOPROL XL     Prolia 60 MG/ML Soln SC injection  Generic drug:  denosumab     SEROquel 100 MG tablet  Generic drug:  QUEtiapine            No discharge procedures on file. Time Spent on discharge is  20 mins in patient examination, evaluation, counseling as well as medication reconciliation, prescriptions for required medications, discharge plan and follow up. Electronically signed by   Bibi Lal DO  9/14/2020  10:58 AM      Thank you Dr. Ana Paula Wheeler MD for the opportunity to be involved in this patient's care.

## 2020-09-11 NOTE — PLAN OF CARE
RN  Outcome: Ongoing  Goal: Maintain absence of muscle cramping  9/11/2020 0958 by Allegra Mao  Outcome: Ongoing  9/11/2020 0412 by Stacie Robles RN  Outcome: Ongoing  Goal: Maintain normal serum potassium, sodium, calcium, phosphorus, and pH  9/11/2020 0958 by Allegra Mao  Outcome: Ongoing  9/11/2020 0412 by Stacie Robles RN  Outcome: Ongoing     Problem: Loneliness or Risk for Loneliness  Goal: Demonstrate positive use of time alone when socialization is not possible  9/11/2020 0958 by Allegra Mao  Outcome: Ongoing  9/11/2020 0412 by Stacie Robles RN  Outcome: Ongoing     Problem: Fatigue  Goal: Verbalize increase energy and improved vitality  9/11/2020 0958 by Allegra Mao  Outcome: Ongoing  9/11/2020 0412 by Stacie Robles RN  Outcome: Ongoing     Problem: Patient Education: Go to Patient Education Activity  Goal: Patient/Family Education  9/11/2020 0958 by Allegra Mao  Outcome: Ongoing  9/11/2020 0412 by Stacie Robles RN  Outcome: Ongoing     Problem: Falls - Risk of:  Goal: Will remain free from falls  Description: Will remain free from falls  9/11/2020 0958 by Allegra Mao  Outcome: Ongoing  9/11/2020 0412 by Stacie Robles RN  Outcome: Ongoing  Goal: Absence of physical injury  Description: Absence of physical injury  9/11/2020 0958 by Allegra Mao  Outcome: Ongoing  9/11/2020 0412 by Stacie Robles RN  Outcome: Ongoing     Problem: Skin Integrity:  Goal: Will show no infection signs and symptoms  Description: Will show no infection signs and symptoms  9/11/2020 0958 by Allegra Mao  Outcome: Ongoing  9/11/2020 0412 by Stacie Robles RN  Outcome: Ongoing  Goal: Absence of new skin breakdown  Description: Absence of new skin breakdown  9/11/2020 0958 by Allegra Mao  Outcome: Ongoing  9/11/2020 0412 by Stacie Robles RN  Outcome: Ongoing

## 2020-09-11 NOTE — FLOWSHEET NOTE
SPIRITUAL CARE NOTE          09/11/2020     PATIENT:             Janey Hay  Room                    3009    C/ [address] had noted in rounding list that patient was listed to be discharged. Checked chart and saw chart on work to facilitate transfer by CSW. Was going to call room phone, but apparently not functioning. Tried to call patient cell which nurse reported she had in her room but number listed in chart was home phone. Sister-in-law who happened to be at home checking on things answered.  introduced himself and spoke briefly with sister-in-law. She said patient did not answer cell, and did not hear call when she did answer. After brief visit sister-in-law expressed appreciation for phone interaction. 09/11/20 1555   Encounter Summary   Services provided to: Family; Patient not available   Referral/Consult From: [facility] Family members   Continue Visiting   (09/11/2020)   Complexity of Encounter Low   Length of Encounter 15 minutes   Spiritual Assessment Completed Yes   Routine   Type Initial   Assessment Approachable; Anxious; Spiritual struggle;Coping   Intervention Active listening;Explored feelings, thoughts, concerns;Nurtured hope   Outcome Acceptance;Expressed gratitude; Less anxious, less agitated

## 2020-09-11 NOTE — CARE COORDINATION
Transitional planning. Called Elisa at central intake for [facility]. They will review vitals and get with me shortly. Nel Francois 103 at central intake. All Covid cases need to go through their RN but I will get an answer todayand she doesn't see a problem with placement. [facility] and [facility] is on will call. Just need to call them as soon as [place] PB gets back to me. [phone] Asked charge nurse to ask floor RN to complete ESHA. [address] and spoke with Hailey. They cannot accept the pt at this time, something about no staff for + covid pts. Tiffany Caputo 1733 daughter Mirlande Soto and notified [facility] will not accept. During my conversation, Bo Dove was calling her on the other line and she asked me to call her back before I got a choice. Tiffany Caputo 1735 daughter back. She said [facility] did not have a bed for her Mom. She asked me to check [facility] which is by her sister in law. I called [facility] located in [address] [place] [address] and they are not accepting covid + pts. I called the daughter back and asked her if I could try [facility] and she agreed. She will also get some other choices from her sister in law around [place] [address] Faxed referral to [facility] and called office. On call person asked me to call back admissions on Monday because they did not know about Covid + pts , or if they were taking any pts at all. I could not leave a message when I asked.

## 2020-09-11 NOTE — PROGRESS NOTES
complete;Minimal assistance  UE Dressing: Setup;Minimal assistance  LE Dressing: Setup; Moderate assistance  Toileting: Setup;Minimal assistance; Moderate assistance  Additional Comments: pt sat on EOB to complete ADLs        Balance  Sitting Balance: Stand by assistance  Standing Balance: Contact guard assistance(w/ RW)  Standing Balance  Time: stood ~ 10-15 min  Activity: demo functional transfers in room w/ RW and used the toilet.  pt stood at sink to complete simple hand hygiene and stood for mickie care/backside mgt w/ RW  Toilet Transfers  Toilet - Technique: Ambulating  Equipment Used: Raised toilet seat with rails  Toilet Transfer: Minimal assistance  Bed mobility  Supine to Sit: Minimal assistance  Scooting: Minimal assistance  Comment: HOB elevated and pt used the bed rails  Transfers  Stand Step Transfers: Contact guard assistance(w/ RW)  Sit to stand: Minimal assistance  Stand to sit: Contact guard assistance  Attendance  Participation: Active participation            Plan   Plan  Times per week: 3-4 x/wk  Current Treatment Recommendations: Balance Training, Functional Mobility Training, Endurance Training, Safety Education & Training, Patient/Caregiver Education & Training, Equipment Evaluation, Education, & procurement, Self-Care / ADL     Goals  Short term goals  Time Frame for Short term goals: By discharge, pt will:  Short term goal 1: Demo Mod I with use of LRD for functional transfers and functional mobility  Short term goal 2: Demo I with setup for UB ADLs and grooming tasks  Short term goal 3: Demo I with setup for LB ADLs and toileting tasks  Short term goal 4: Demo 8+ minutes dynamic standing to increase safety and independence with ADLs/IADLs  Short term goal 5: Demo I with V/D 2+ EC/WS techniques to incorporate into daily routine       Therapy Time   Individual Concurrent Group Co-treatment   Time In  13:45         Time Out  14:45         Minutes  60          time code min: 60 min       Juan Carlos Age JUAN Botello/ANNIE

## 2020-09-11 NOTE — PROGRESS NOTES
Physical Therapy  Facility/Department: [facility] 3  Daily Treatment Note  NAME: Zora Lou  : 10/28/1932  MRN: [id number]    Date of Service: 2020    Discharge Recommendations:  Patient would benefit from continued therapy after discharge   PT Equipment Recommendations  Equipment Needed: No    Assessment   Body structures, Functions, Activity limitations: Decreased functional mobility ; Decreased strength;Decreased balance;Decreased endurance  Assessment: Pt would benefit from continued acute PT to address deficits. Prognosis: Good  REQUIRES PT FOLLOW UP: Yes  Activity Tolerance  Activity Tolerance: Patient Tolerated treatment well;Patient limited by endurance     Patient Diagnosis(es): There were no encounter diagnoses. has a past medical history of Anemia, Anxiety, Naranjo disease, Bradycardia, Cataracts, bilateral, Colon polyp, Diverticulosis, Fibroid, Forgetfulness, Gait disturbance, Gall stones, Headache(784.0), Hearing loss, HTN (hypertension), Insomnia, Lichen sclerosus, Osteoporosis, Other activity(E029.9), Pap smear for cervical cancer screening, Pneumonia, Vaginal atrophy, and Vaginal discharge. has a past surgical history that includes vulvar/perineal biopsy; Cholecystectomy; blepharoplasty; other surgical history; Hysterectomy; Breast biopsy; joint replacement (); and Kidney cyst removal.    Restrictions  Restrictions/Precautions  Restrictions/Precautions: Fall Risk, Isolation  Required Braces or Orthoses?: No  Position Activity Restriction  Other position/activity restrictions: up with assist  Subjective   General  Response To Previous Treatment: Patient with no complaints from previous session.   Family / Caregiver Present: No  Subjective  Subjective: Pt resting in bed upon arrival, agreeable to PT, requests going to commode  Pain Screening  Patient Currently in Pain: Denies  Vital Signs  Patient Currently in Pain: Denies       Orientation  Orientation  Overall Orientation Status: Within Functional Limits  Cognition      Objective   Bed mobility  Rolling to Right: Stand by assistance  Supine to Sit: Stand by assistance  Sit to Supine: Unable to assess  Scooting: Stand by assistance  Transfers  Sit to Stand: Contact guard assistance  Stand to sit: Contact guard assistance  Stand Pivot Transfers: Contact guard assistance(x3 trials to and from commode)  Comment: Increased time to complete due to multiple stand pivot tranfers and frequent BM  Ambulation  Ambulation?: Yes  Ambulation 1  Surface: level tile  Device: Rolling Walker  Assistance: Contact guard assistance  Quality of Gait: decreased froilan, flexed posture, small shuffled steps  Gait Deviations: Slow Froilan; Shuffles;Decreased step length  Distance: 40 ft  Comments: increased time and effort to complete  Stairs/Curb  Stairs?: No     Balance  Posture: Good  Sitting - Static: Good  Sitting - Dynamic: Good;-  Standing - Static: Fair;+  Standing - Dynamic: Fair  Comments: RW used while assessing standing balance    Exercise  Seated LE exercise program: Long Arc Quads, hip abduction/adduction, heel/toe raises, and marches.  Reps: 15x     Goals  Short term goals  Time Frame for Short term goals: 14 visits  Short term goal 1: Pt will be Yareli bed mobility  Short term goal 2: Pt will be Yareli transfers  Short term goal 3: Pt will be Yareli amb 200' RW or least restrictive AD  Short term goal 4: Pt will navigate 2 steps Yareli    Plan    Plan  Times per week: 3-5x/wk  Current Treatment Recommendations: Strengthening, Balance Training, Functional Mobility Training, Transfer Training, Gait Training, Endurance Training, Home Exercise Program, Safety Education & Training, Patient/Caregiver Education & Training, Equipment Evaluation, Education, & procurement, Stair training  Safety Devices  Type of devices: Call light within reach, Nurse notified, Gait belt, Patient at risk for falls, Left in chair, All fall risk precautions in place, Chair alarm in place Therapy Time   Individual Concurrent Group Co-treatment   Time In 0505         Time Out 1015         Minutes 52         Timed Code Treatment Minutes: [address] [place], [place]

## 2020-09-11 NOTE — PROGRESS NOTES
[facility]  Progress Note    Julio Lloyd  MRN: [id number]  Date: 9/11/2020  LOS: 6     Reason for F/U :   COVID-19 virus infection    Impression :   1. COVID-19 virus infection  2. Congestive heart failure    Recommendations:   · The patient has done well clinically and did not qualify for therapy with Remdesivir  · The patient continues to do well clinically. · Infectious disease wise she is okay for discharge    Infection Control Recommendations:   Droplet plus precautions    Discharge Planning:   Patient will need Midline Catheter Insertion/ PICC line Insertion: No  Patient will need: Home IV , [place],  SNF,  LTAC: Undetermined  Patient willneed outpatient wound care: No    Medical Decision making / Summary of Stay:   Julio Lloyd is a 80y.o.-year-old  female who was initially admitted on 9/5/2020. Patient seen at the request of .     INITIAL HISTORY:     Patient presented through ER with complaints of fatigue, generalized weakness and fevers. The patient denied headache, cough, shortness of breath, expectoration of secretions. She also denied GI symptoms such as abdominal pain nausea vomiting or diarrhea.     The patient presented because she was fearful that her daughter may have COVID and she may have become exposed. The patient lives at her home but is helped by her family because of her age. Remains ambulatory with the help of a walker     In the emergency room her chest x-ray on 9/4/2020 was normal.  It showed atherosclerosis of the aorta but no pulmonary problems other than evidence of old granulomatous disease.     White count was 3.0 with a hemoglobin of 10.9 and platelets of 105. Troponin and myoglobin were elevated. Her ferritin was elevated at 283.   Her proBNP was elevated at 2186  Her COVID-19 test was positive on 9/4/2020.     Patient admitted because of concerns with COVID 19    Current evaluation:9/11/2020    /79   Pulse 67 Temp 98.7 °F (37.1 °C) (Oral)   Resp 15   Ht 5' 9\" (1.753 m)   Wt 149 lb (67.6 kg)   SpO2 93%   BMI 22.00 kg/m²     Temperature Range: Temp: 98.7 °F (37.1 °C) Temp  Av °F (37.2 °C)  Min: 98.7 °F (37.1 °C)  Max: 99.2 °F (37.3 °C)  The patient is seen and evaluated at bedside she is awake and alert in no acute distress. She does report a slight cough but no significant shortness of breath. She remains on room air and is saturating very well. No subjective fevers or chills. No significant shortness of breath. Does have some generalized weakness. Review of Systems   Constitutional: Negative. Respiratory: Positive for cough. Cardiovascular: Negative. Gastrointestinal: Negative. Genitourinary: Negative. Musculoskeletal: Negative. Skin: Negative. Neurological: Negative. Psychiatric/Behavioral: Negative. Physical Examination :     Physical Exam  Constitutional:       Appearance: She is well-developed. HENT:      Head: Normocephalic and atraumatic. Neck:      Musculoskeletal: Normal range of motion and neck supple. Cardiovascular:      Rate and Rhythm: Regular rhythm. Heart sounds: Normal heart sounds. Pulmonary:      Effort: Pulmonary effort is normal.      Breath sounds: Rales present. Abdominal:      General: Bowel sounds are normal.      Palpations: Abdomen is soft. Skin:     General: Skin is warm and dry. Neurological:      Mental Status: She is alert and oriented to person, place, and time. Laboratory data:   I have independently reviewed the followinglabs:  CBC with Differential:   No results for input(s): WBC, HGB, HCT, PLT, SEGSPCT, BANDSPCT, LYMPHOPCT, MONOPCT, EOSPCT in the last 72 hours. BMP:   No results for input(s): NA, K, CL, CO2, BUN, CREATININE, MG in the last 72 hours. Invalid input(s): CA  Hepatic Function Panel:   No results for input(s): PROT, LABALBU, BILIDIR, IBILI, BILITOT, ALKPHOS, ALT, AST in the last 72 hours.       Lab Results   Component Value Date    PROCAL 0.07 09/04/2020     Lab Results   Component Value Date    CRP 3.1 09/04/2020     No results found for: SEDRATE      Lab Results   Component Value Date    DDIMER 0.73 09/06/2020    DDIMER 1.15 09/05/2020     Lab Results   Component Value Date    FERRITIN 283 09/04/2020     Lab Results   Component Value Date     09/04/2020     Lab Results   Component Value Date    FIBRINOGEN 227 09/06/2020    FIBRINOGEN 169 09/05/2020       Results in Past 30 Days  Result Component Current Result Ref Range Previous Result Ref Range   SARS-CoV-2 DETECTED (A) (9/8/2020) Not Detected      (9/4/2020)          (9/8/2020)       (9/4/2020)          (9/8/2020)  DETECTED (A) (9/4/2020) Not Detected     Lab Results   Component Value Date    COVID19 DETECTED 09/08/2020    COVID19 DETECTED 09/04/2020       No results for input(s): VANCOTROUGH in the last 72 hours. Imaging Studies:   No new imaging    Cultures:   None    Medications:      lisinopril  10 mg Oral Daily    metoprolol succinate  25 mg Oral Daily    famotidine  20 mg Oral Daily    donepezil  10 mg Oral Nightly    Vitamin D  2,000 Units Oral Daily    therapeutic multivitamin-minerals  1 tablet Oral Daily    sodium chloride flush  10 mL Intravenous 2 times per day    enoxaparin  30 mg Subcutaneous BID    ARIPiprazole  5 mg Oral Daily    citalopram  20 mg Oral Daily           [facility]  Andrea Balderas MD  [employer] messaging  OFFICE: [phone]      Electronically signed by Andrea Balderas MD on 9/11/2020 at 10:28 AM  Thank you for allowing us to participate in the care of this patient. Please call with questions.     This note iscreated with the assistance of a speech recognition program.  While intending to generate a document that actually reflects the content of the visit, the document can still have some errors including those of syntax andsound a like substitutions which may escape proof reading. In such instances, actual meaning can be extrapolated by contextual diversion.

## 2020-09-11 NOTE — PROGRESS NOTES
Nutrition Assessment     Type and Reason for Visit: Initial(LOS)    Nutrition Recommendations/Plan: Continue current General diet. Encourage/monitor intakes as tolerated. Provide ONS as/if needed with meals. Nutrition Assessment:  Pt's chart reviewed based on length of stay. Admitted with c/o fatigue, weakness, and fever - pt + for covid. Pt has been tolerating a General diet without issues and eating well at meals. Noted pt ate more than 75% of her breakfast this morning. Malnutrition Assessment:  Malnutrition Status: Insufficient data    Estimated Daily Nutrient Needs:  Energy (kcal): 20-22 kcals/kg = 0112-8263 kcals/day; Weight Used for Energy Requirements:  Admission  Protein (g): 1.0-1.3 gm/kg = 65-85 gm pro/day; Weight Used for Protein Requirements:  Ideal          Nutrition Related Findings: Labs/Meds reviewed. BM 9/10. Current Nutrition Therapies:    DIET GENERAL; Anthropometric Measures:  · Height: 5' 9\" (175.3 cm)  · Current Body Wt: 149 lb (67.6 kg)   · BMI: 22    Nutrition Diagnosis:   · Predicted inadequate energy intake related to (current medical condition) as evidenced by (improved PO intakes of %)    Nutrition Interventions:   Food and/or Nutrient Delivery:  Continue Current Diet  Nutrition Education/Counseling:  No recommendation at this time   Coordination of Nutrition Care:  Continued Inpatient Monitoring    Goals:  Oral intakes to meet % of estimated nutrition needs.        Nutrition Monitoring and Evaluation:   Behavioral-Environmental Outcomes:  (N/A)   Food/Nutrient Intake Outcomes:  Food and Nutrient Intake  Physical Signs/Symptoms Outcomes:  Biochemical Data, GI Status, Skin, Weight     Discharge Planning:    Continue current diet     Electronically signed by Juan De Anda RD, LD on 9/11/20 at 11:36 AM EDT    Contact: [phone]

## 2020-09-12 ENCOUNTER — APPOINTMENT (OUTPATIENT)
Dept: GENERAL RADIOLOGY | Age: 85
DRG: 179 | End: 2020-09-12
Attending: INTERNAL MEDICINE
Payer: MEDICARE

## 2020-09-12 LAB
ALBUMIN SERPL-MCNC: 3.1 G/DL (ref 3.5–5.2)
ALBUMIN SERPL-MCNC: 3.1 G/DL (ref 3.5–5.2)
ALBUMIN/GLOBULIN RATIO: 1.4 (ref 1–2.5)
ALP BLD-CCNC: 59 U/L (ref 35–104)
ALT SERPL-CCNC: 73 U/L (ref 5–33)
ANION GAP SERPL CALCULATED.3IONS-SCNC: 11 MMOL/L (ref 9–17)
AST SERPL-CCNC: 70 U/L
BILIRUB SERPL-MCNC: 0.27 MG/DL (ref 0.3–1.2)
BILIRUBIN DIRECT: 0.1 MG/DL
BILIRUBIN, INDIRECT: 0.17 MG/DL (ref 0–1)
BUN BLDV-MCNC: 24 MG/DL (ref 8–23)
BUN/CREAT BLD: ABNORMAL (ref 9–20)
C-REACTIVE PROTEIN: 4.2 MG/L (ref 0–5)
CALCIUM SERPL-MCNC: 8.2 MG/DL (ref 8.6–10.4)
CHLORIDE BLD-SCNC: 100 MMOL/L (ref 98–107)
CO2: 28 MMOL/L (ref 20–31)
CREAT SERPL-MCNC: 0.82 MG/DL (ref 0.5–0.9)
FERRITIN: 423 UG/L (ref 13–150)
FIBRINOGEN: 243 MG/DL (ref 140–420)
GFR AFRICAN AMERICAN: >60 ML/MIN
GFR NON-AFRICAN AMERICAN: >60 ML/MIN
GFR SERPL CREATININE-BSD FRML MDRD: ABNORMAL ML/MIN/{1.73_M2}
GFR SERPL CREATININE-BSD FRML MDRD: ABNORMAL ML/MIN/{1.73_M2}
GLOBULIN: ABNORMAL G/DL (ref 1.5–3.8)
GLUCOSE BLD-MCNC: 168 MG/DL (ref 70–99)
HCT VFR BLD CALC: 34.5 % (ref 36.3–47.1)
HEMOGLOBIN: 10.9 G/DL (ref 11.9–15.1)
MAGNESIUM: 2 MG/DL (ref 1.6–2.6)
MCH RBC QN AUTO: 31.5 PG (ref 25.2–33.5)
MCHC RBC AUTO-ENTMCNC: 31.6 G/DL (ref 28.4–34.8)
MCV RBC AUTO: 99.7 FL (ref 82.6–102.9)
NRBC AUTOMATED: 0 PER 100 WBC
PDW BLD-RTO: 13.1 % (ref 11.8–14.4)
PHOSPHORUS: 4.7 MG/DL (ref 2.6–4.5)
PLATELET # BLD: ABNORMAL K/UL (ref 138–453)
PLATELET, FLUORESCENCE: 121 K/UL (ref 138–453)
PLATELET, IMMATURE FRACTION: 3.7 % (ref 1.1–10.3)
PMV BLD AUTO: ABNORMAL FL (ref 8.1–13.5)
POTASSIUM SERPL-SCNC: 4.3 MMOL/L (ref 3.7–5.3)
RBC # BLD: 3.46 M/UL (ref 3.95–5.11)
SODIUM BLD-SCNC: 139 MMOL/L (ref 135–144)
TOTAL PROTEIN: 5.3 G/DL (ref 6.4–8.3)
WBC # BLD: 2.7 K/UL (ref 3.5–11.3)

## 2020-09-12 PROCEDURE — 82248 BILIRUBIN DIRECT: CPT

## 2020-09-12 PROCEDURE — 84100 ASSAY OF PHOSPHORUS: CPT

## 2020-09-12 PROCEDURE — 85027 COMPLETE CBC AUTOMATED: CPT

## 2020-09-12 PROCEDURE — 86140 C-REACTIVE PROTEIN: CPT

## 2020-09-12 PROCEDURE — 83735 ASSAY OF MAGNESIUM: CPT

## 2020-09-12 PROCEDURE — 6360000002 HC RX W HCPCS: Performed by: INTERNAL MEDICINE

## 2020-09-12 PROCEDURE — 85055 RETICULATED PLATELET ASSAY: CPT

## 2020-09-12 PROCEDURE — 80076 HEPATIC FUNCTION PANEL: CPT

## 2020-09-12 PROCEDURE — 6370000000 HC RX 637 (ALT 250 FOR IP): Performed by: FAMILY MEDICINE

## 2020-09-12 PROCEDURE — 80069 RENAL FUNCTION PANEL: CPT

## 2020-09-12 PROCEDURE — 6370000000 HC RX 637 (ALT 250 FOR IP): Performed by: INTERNAL MEDICINE

## 2020-09-12 PROCEDURE — 99231 SBSQ HOSP IP/OBS SF/LOW 25: CPT | Performed by: INTERNAL MEDICINE

## 2020-09-12 PROCEDURE — 36415 COLL VENOUS BLD VENIPUNCTURE: CPT

## 2020-09-12 PROCEDURE — 99232 SBSQ HOSP IP/OBS MODERATE 35: CPT | Performed by: INTERNAL MEDICINE

## 2020-09-12 PROCEDURE — 80053 COMPREHEN METABOLIC PANEL: CPT

## 2020-09-12 PROCEDURE — 71045 X-RAY EXAM CHEST 1 VIEW: CPT

## 2020-09-12 PROCEDURE — 85384 FIBRINOGEN ACTIVITY: CPT

## 2020-09-12 PROCEDURE — 6370000000 HC RX 637 (ALT 250 FOR IP): Performed by: NURSE PRACTITIONER

## 2020-09-12 PROCEDURE — 2580000003 HC RX 258: Performed by: INTERNAL MEDICINE

## 2020-09-12 PROCEDURE — 82728 ASSAY OF FERRITIN: CPT

## 2020-09-12 PROCEDURE — 2060000000 HC ICU INTERMEDIATE R&B

## 2020-09-12 RX ORDER — SODIUM CHLORIDE 9 MG/ML
INJECTION, SOLUTION INTRAVENOUS CONTINUOUS
Status: DISCONTINUED | OUTPATIENT
Start: 2020-09-12 | End: 2020-09-13

## 2020-09-12 RX ADMIN — SODIUM CHLORIDE, PRESERVATIVE FREE 10 ML: 5 INJECTION INTRAVENOUS at 10:21

## 2020-09-12 RX ADMIN — MULTIPLE VITAMINS W/ MINERALS TAB 1 TABLET: TAB at 10:21

## 2020-09-12 RX ADMIN — SODIUM CHLORIDE: 9 INJECTION, SOLUTION INTRAVENOUS at 17:50

## 2020-09-12 RX ADMIN — FAMOTIDINE 20 MG: 20 TABLET, FILM COATED ORAL at 10:21

## 2020-09-12 RX ADMIN — ENOXAPARIN SODIUM 30 MG: 30 INJECTION SUBCUTANEOUS at 10:21

## 2020-09-12 RX ADMIN — ARIPIPRAZOLE 5 MG: 5 TABLET ORAL at 10:21

## 2020-09-12 RX ADMIN — ENOXAPARIN SODIUM 30 MG: 30 INJECTION SUBCUTANEOUS at 21:52

## 2020-09-12 RX ADMIN — Medication 10 ML: at 10:21

## 2020-09-12 RX ADMIN — LISINOPRIL 10 MG: 20 TABLET ORAL at 10:21

## 2020-09-12 RX ADMIN — Medication 10 ML: at 17:50

## 2020-09-12 RX ADMIN — Medication 3 MG: at 21:52

## 2020-09-12 RX ADMIN — DONEPEZIL HYDROCHLORIDE 10 MG: 10 TABLET, FILM COATED ORAL at 21:52

## 2020-09-12 RX ADMIN — VITAMIN D, TAB 1000IU (100/BT) 2000 UNITS: 25 TAB at 10:21

## 2020-09-12 RX ADMIN — CITALOPRAM 20 MG: 20 TABLET, FILM COATED ORAL at 10:21

## 2020-09-12 ASSESSMENT — ENCOUNTER SYMPTOMS
WHEEZING: 0
COUGH: 0
SHORTNESS OF BREATH: 0
CHEST TIGHTNESS: 0
CONSTIPATION: 0
COUGH: 1
BLOOD IN STOOL: 0
ABDOMINAL PAIN: 0
VOMITING: 0
NAUSEA: 0
DIARRHEA: 0

## 2020-09-12 ASSESSMENT — PAIN SCALES - GENERAL: PAINLEVEL_OUTOF10: 0

## 2020-09-12 NOTE — PROGRESS NOTES
[facility]  Office: [address], DO, Taiwo Staley, DO, Janey Vigil, DO, Vernel Nose Blood, DO, Ramandeep Ribera MD, Gillian Faria MD, Kathy Ram MD, Shona Patel MD, Supriya Gamez MD, Abby Newell MD, Deneen Fontenot MD, Komal Martines MD, Mbonu Marylee Narrow, MD, Charlee Guzman, DO, Micheal Watson MD, Jourdan Griffith MD, Bibi Lal DO, Ralph Harmon MD,  Marquez Zamora DO, Frankie Adams MD, William Calderon MD, Corbin Jaime, [facility], [facility] Center[facility] Serrano, CNP, Breanne Caballero, CNP, Victoria Serrato, CNS, Oumou Rene, CNP, Trevor Guzman, CNP, Jono Petit, CNP, Anthony Torres, CNP, Akanksha Galarza, CNP, Mireille Mon PA-C, Narendra Dominguez, [facility], Tremayne Mo, CNP, Jerardo Caldwell, CNP, Mary Hylton, CNP, Hussain Sommer, [facility], Shivani Parr, [address] [place] [address]    Progress Note    9/12/2020    11:48 AM    Name:   Olivier Mayo  MRN:     [id number]     Acct:      [de-identified]   Room:   [address] [place] [address] Day:  7  Admit Date:  9/5/2020  1:19 AM    PCP:   Ana Paula Wheeler MD  Code Status:  Full Code    Subjective:     C/C: fever  Interval History Status: not changed. Worsening fatigue and appetite loss this morning. Discussed with infectious disease and check chest x-ray, ferritin, and CRP. Brief History:     Olivier Mayo is a 80 y.o. Non-/non  female who presents with No chief complaint on file. and is admitted to the hospital for the management of COVID-19 virus infection.   This is a pleasant 80 y o female  With known h/o HTN, cognitive impairment, and anxiety and osteoporosis presented to [facility] ER with concerns of fever and fatigue, patient family member are being worked up for COVID-19 and there were concerns of the patient might have COVID-23.  12 in ER came back positive, initial labs in the ER reflected creatinine of 1.08 elevated BNP at 2000 with some elevation of troponin at 32, inflammatory markers were not significantly elevated, patient initial chest x-ray was not remarkable for any acute pathology. He was transferred to our facility for further management  Currently she denies any chest pain, sob, nausea, vomiting, fever or chills   She is hypoxic . Review of Systems:     Review of Systems   Constitutional: Negative for chills, diaphoresis and fever. HENT: Negative for congestion. Eyes: Negative for visual disturbance. Respiratory: Negative for cough, chest tightness, shortness of breath and wheezing. Cardiovascular: Negative for chest pain, palpitations and leg swelling. Gastrointestinal: Negative for abdominal pain, blood in stool, constipation, diarrhea, nausea and vomiting. Genitourinary: Negative for difficulty urinating. Neurological: Negative for dizziness, weakness, light-headedness, numbness and headaches. All other systems reviewed and are negative. Medications:      Allergies:  No Known Allergies    Current Meds:   Scheduled Meds:    lisinopril  10 mg Oral Daily    famotidine  20 mg Oral Daily    donepezil  10 mg Oral Nightly    Vitamin D  2,000 Units Oral Daily    therapeutic multivitamin-minerals  1 tablet Oral Daily    sodium chloride flush  10 mL Intravenous 2 times per day    enoxaparin  30 mg Subcutaneous BID    ARIPiprazole  5 mg Oral Daily    citalopram  20 mg Oral Daily     Continuous Infusions:   PRN Meds: dextromethorphan-guaiFENesin, melatonin, bisacodyl, polyethylene glycol, sodium chloride flush, potassium chloride **OR** potassium alternative oral replacement **OR** potassium chloride, magnesium sulfate, acetaminophen **OR** acetaminophen, promethazine **OR** ondansetron, nicotine, senna    Data:     Past Medical History:   has a past medical history of Anemia, Anxiety, Naranjo disease, Bradycardia, Cataracts, bilateral, Colon polyp, Diverticulosis, Fibroid, Forgetfulness, Gait disturbance, Gall stones, Headache(784.0), Hearing loss, HTN Component Value Date/Time    SPECIAL NOT REPORTED 09/04/2020 08:31 PM     Lab Results   Component Value Date/Time    CULTURE NO GROWTH 09/04/2020 08:31 PM       Radiology:  Xr Chest Portable    Result Date: 9/4/2020  No acute pulmonary disease. Calcific atherosclerotic disease aorta. Physical Examination:        General appearance: Sleepy but arousable, cooperative and no distress  Mental Status:  oriented to person, place and time and normal affect  Lungs:  clear to auscultation bilaterally, normal effort  Heart:  regular rate and rhythm, no murmur  Abdomen:  soft, nontender, nondistended, normal bowel sounds, no masses, hepatomegaly, splenomegaly  Extremities:  no edema, redness, tenderness in the calves  Skin:  no gross lesions, rashes, induration    Assessment:        Hospital Problems           Last Modified POA    * (Principal) COVID-19 virus infection 9/4/2020 Yes    Essential hypertension 9/4/2020 Yes    Thrombocytopenia ([address] 9/4/2020 Yes    Elevated troponin 9/5/2020 Yes    Elevated brain natriuretic peptide (BNP) level 9/5/2020 Yes          Plan:        COVID-19 infection: Infectious disease recommending chest x-ray CRP and ferritin. Considering initiation Remdesivir. Essential hypertension:  Discontinue Toprol-XL, continue lisinopril at lower dose of 10mg daily      Dementia/cognitive impairment: Continue chronic medication     Thrombocytopenia monitor closely: Okay for DVT prophylaxis for now    -Get chest x-ray, CRP, ferritin.     Nino Uribe DO  9/12/2020  11:48 AM

## 2020-09-12 NOTE — PROGRESS NOTES
[facility]  Progress Note    Eileen Foley  MRN: [id number]  Date: 9/12/2020  LOS: 7     Reason for F/U :   COVID-19 virus infection    Impression :   1. COVID-19 virus infection  2. Congestive heart failure    Recommendations:   · Earlier did not qualify for therapy with Remdesivir  · Due to worsened general fatigue and loss of appetite, get a cxr look for any covid pneumonia and decide on Remdesevir then  · Get Ferritin and CRP    Infection Control Recommendations:   Droplet plus precautions    Discharge Planning:   Patient will need Midline Catheter Insertion/ PICC line Insertion: No  Patient will need: Home IV , [place],  SNF,  LTAC: Undetermined  Patient willneed outpatient wound care: No    Medical Decision making / Summary of Stay:   Eileen Foley is a 80y.o.-year-old  female who was initially admitted on 9/5/2020. Patient seen at the request of .     INITIAL HISTORY:     Patient presented through ER with complaints of fatigue, generalized weakness and fevers. The patient denied headache, cough, shortness of breath, expectoration of secretions. She also denied GI symptoms such as abdominal pain nausea vomiting or diarrhea.     The patient presented because she was fearful that her daughter may have COVID and she may have become exposed. The patient lives at her home but is helped by her family because of her age. Remains ambulatory with the help of a walker     In the emergency room her chest x-ray on 9/4/2020 was normal.  It showed atherosclerosis of the aorta but no pulmonary problems other than evidence of old granulomatous disease.     White count was 3.0 with a hemoglobin of 10.9 and platelets of 980. Troponin and myoglobin were elevated. Her ferritin was elevated at 283.   Her proBNP was elevated at 2186  Her COVID-19 test was positive on 9/4/2020.     Patient admitted because of concerns with COVID 19    Current evaluation:9/12/2020    /69 Pulse 59   Temp 98.2 °F (36.8 °C) (Oral)   Resp 17   Ht 5' 9\" (1.753 m)   Wt 149 lb (67.6 kg)   SpO2 91%   BMI 22.00 kg/m²     Temperature Range: Temp: 98.2 °F (36.8 °C) Temp  Av.2 °F (36.8 °C)  Min: 97.9 °F (36.6 °C)  Max: 98.6 °F (37 °C)      Due to the current efforts to prevent transmission of COVID-19 and also the need to preserve PPE for other caregivers, a face-to-face encounter with the patient was not performed. That being said, all relevant records and diagnostic tests were reviewed, including laboratory results and imaging. Patient was evaluated from the window, called on the phone, and chart reviewed, disc w  involved healthcare workers. No fever but very weak - does not feels good today - poor appetite while she was eating well before  Moist cough and feels ill  so2 dropped to 88 at night on RA and was placed on 2 L, So2 94 now  No recent CXR      Review of Systems   Constitutional: Positive for appetite change and fatigue. Negative for chills and fever. Respiratory: Positive for cough. Physical Examination :     Physical Exam  Constitutional:       Appearance: She is normal weight. Pulmonary:      Effort: No respiratory distress. Neurological:      General: No focal deficit present. Mental Status: She is alert and oriented to person, place, and time. Laboratory data:   I have independently reviewed the followinglabs:  CBC with Differential:   No results for input(s): WBC, HGB, HCT, PLT, SEGSPCT, BANDSPCT, LYMPHOPCT, MONOPCT, EOSPCT in the last 72 hours. BMP:   No results for input(s): NA, K, CL, CO2, BUN, CREATININE, MG in the last 72 hours. Invalid input(s): CA  Hepatic Function Panel:   No results for input(s): PROT, LABALBU, BILIDIR, IBILI, BILITOT, ALKPHOS, ALT, AST in the last 72 hours.       Lab Results   Component Value Date    PROCAL 0.07 2020     Lab Results   Component Value Date    CRP 3.1 2020     No results found for: Julian Drummond Results   Component Value Date    DDIMER 0.73 09/06/2020    DDIMER 1.15 09/05/2020     Lab Results   Component Value Date    FERRITIN 283 09/04/2020     Lab Results   Component Value Date     09/04/2020     Lab Results   Component Value Date    FIBRINOGEN 227 09/06/2020    FIBRINOGEN 169 09/05/2020       Results in Past 30 Days  Result Component Current Result Ref Range Previous Result Ref Range   SARS-CoV-2 DETECTED (A) (9/8/2020) Not Detected      (9/4/2020)          (9/8/2020)       (9/4/2020)          (9/8/2020)  DETECTED (A) (9/4/2020) Not Detected     Lab Results   Component Value Date    COVID19 DETECTED 09/08/2020    COVID19 DETECTED 09/04/2020       No results for input(s): VANCOTROUGH in the last 72 hours. Imaging Studies:   No new imaging    Cultures:   None    Medications:      lisinopril  10 mg Oral Daily    famotidine  20 mg Oral Daily    donepezil  10 mg Oral Nightly    Vitamin D  2,000 Units Oral Daily    therapeutic multivitamin-minerals  1 tablet Oral Daily    sodium chloride flush  10 mL Intravenous 2 times per day    enoxaparin  30 mg Subcutaneous BID    ARIPiprazole  5 mg Oral Daily    citalopram  20 mg Oral Daily           [facility]  Perfect Serve messaging  OFFICE: [phone]      Electronically signed by Kenji Garcia MD on 9/12/2020 at 10:30 AM  Thank you for allowing us to participate in the care of this patient. Please call with questions. This note iscreated with the assistance of a speech recognition program.  While intending to generate a document that actually reflects the content of the visit, the document can still have some errors including those of syntax andsound a like substitutions which may escape proof reading. In such instances, actual meaning can be extrapolated by contextual diversion.

## 2020-09-13 PROCEDURE — 2580000003 HC RX 258: Performed by: INTERNAL MEDICINE

## 2020-09-13 PROCEDURE — 99231 SBSQ HOSP IP/OBS SF/LOW 25: CPT | Performed by: INTERNAL MEDICINE

## 2020-09-13 PROCEDURE — 6370000000 HC RX 637 (ALT 250 FOR IP): Performed by: FAMILY MEDICINE

## 2020-09-13 PROCEDURE — 6360000002 HC RX W HCPCS: Performed by: INTERNAL MEDICINE

## 2020-09-13 PROCEDURE — 6370000000 HC RX 637 (ALT 250 FOR IP): Performed by: INTERNAL MEDICINE

## 2020-09-13 PROCEDURE — 6370000000 HC RX 637 (ALT 250 FOR IP): Performed by: NURSE PRACTITIONER

## 2020-09-13 PROCEDURE — 2060000000 HC ICU INTERMEDIATE R&B

## 2020-09-13 RX ORDER — FLUTICASONE PROPIONATE 50 MCG
1 SPRAY, SUSPENSION (ML) NASAL DAILY
Status: DISCONTINUED | OUTPATIENT
Start: 2020-09-13 | End: 2020-09-14 | Stop reason: HOSPADM

## 2020-09-13 RX ORDER — CETIRIZINE HYDROCHLORIDE 10 MG/1
10 TABLET ORAL DAILY
Status: DISCONTINUED | OUTPATIENT
Start: 2020-09-13 | End: 2020-09-14 | Stop reason: HOSPADM

## 2020-09-13 RX ADMIN — Medication 10 ML: at 14:17

## 2020-09-13 RX ADMIN — DONEPEZIL HYDROCHLORIDE 10 MG: 10 TABLET, FILM COATED ORAL at 20:01

## 2020-09-13 RX ADMIN — CETIRIZINE HYDROCHLORIDE 10 MG: 10 TABLET ORAL at 17:58

## 2020-09-13 RX ADMIN — Medication 10 ML: at 10:07

## 2020-09-13 RX ADMIN — ENOXAPARIN SODIUM 30 MG: 30 INJECTION SUBCUTANEOUS at 10:07

## 2020-09-13 RX ADMIN — FLUTICASONE PROPIONATE 1 SPRAY: 50 SPRAY, METERED NASAL at 17:58

## 2020-09-13 RX ADMIN — SODIUM CHLORIDE: 9 INJECTION, SOLUTION INTRAVENOUS at 13:43

## 2020-09-13 RX ADMIN — VITAMIN D, TAB 1000IU (100/BT) 2000 UNITS: 25 TAB at 10:08

## 2020-09-13 RX ADMIN — CITALOPRAM 20 MG: 20 TABLET, FILM COATED ORAL at 10:08

## 2020-09-13 RX ADMIN — Medication 3 MG: at 19:05

## 2020-09-13 RX ADMIN — MULTIPLE VITAMINS W/ MINERALS TAB 1 TABLET: TAB at 10:08

## 2020-09-13 RX ADMIN — ENOXAPARIN SODIUM 30 MG: 30 INJECTION SUBCUTANEOUS at 20:01

## 2020-09-13 RX ADMIN — ARIPIPRAZOLE 5 MG: 5 TABLET ORAL at 10:08

## 2020-09-13 RX ADMIN — SENNOSIDES 8.6 MG: 8.6 TABLET, FILM COATED ORAL at 10:08

## 2020-09-13 RX ADMIN — FAMOTIDINE 20 MG: 20 TABLET, FILM COATED ORAL at 10:08

## 2020-09-13 ASSESSMENT — PAIN SCALES - GENERAL
PAINLEVEL_OUTOF10: 0

## 2020-09-13 ASSESSMENT — ENCOUNTER SYMPTOMS
BLOOD IN STOOL: 0
COUGH: 0
SHORTNESS OF BREATH: 0
VOMITING: 0
ABDOMINAL PAIN: 0
CHEST TIGHTNESS: 0
CONSTIPATION: 0
NAUSEA: 0
DIARRHEA: 0
WHEEZING: 0

## 2020-09-13 NOTE — PROGRESS NOTES
patient initial chest x-ray was not remarkable for any acute pathology. He was transferred to our facility for further management  Currently she denies any chest pain, sob, nausea, vomiting, fever or chills   She is hypoxic . Review of Systems:     Review of Systems   Constitutional: Negative for chills, diaphoresis and fever. HENT: Negative for congestion. Eyes: Negative for visual disturbance. Respiratory: Negative for cough, chest tightness, shortness of breath and wheezing. Cardiovascular: Negative for chest pain, palpitations and leg swelling. Gastrointestinal: Negative for abdominal pain, blood in stool, constipation, diarrhea, nausea and vomiting. Genitourinary: Negative for difficulty urinating. Neurological: Negative for dizziness, weakness, light-headedness, numbness and headaches. All other systems reviewed and are negative. Medications:      Allergies:  No Known Allergies    Current Meds:   Scheduled Meds:    fluticasone  1 spray Each Nostril Daily    cetirizine  10 mg Oral Daily    famotidine  20 mg Oral Daily    donepezil  10 mg Oral Nightly    Vitamin D  2,000 Units Oral Daily    therapeutic multivitamin-minerals  1 tablet Oral Daily    sodium chloride flush  10 mL Intravenous 2 times per day    enoxaparin  30 mg Subcutaneous BID    ARIPiprazole  5 mg Oral Daily    citalopram  20 mg Oral Daily     Continuous Infusions:   PRN Meds: dextromethorphan-guaiFENesin, melatonin, bisacodyl, polyethylene glycol, sodium chloride flush, potassium chloride **OR** potassium alternative oral replacement **OR** potassium chloride, magnesium sulfate, acetaminophen **OR** acetaminophen, promethazine **OR** ondansetron, nicotine, senna    Data:     Past Medical History:   has a past medical history of Anemia, Anxiety, Naranjo disease, Bradycardia, Cataracts, bilateral, Colon polyp, Diverticulosis, Fibroid, Forgetfulness, Gait disturbance, Gall stones, Headache(784.0), Hearing loss, HTN (hypertension), Insomnia, Lichen sclerosus, Osteoporosis, Other activity(E029.9), Pap smear for cervical cancer screening, Pneumonia, Vaginal atrophy, and Vaginal discharge. Social History:   reports that she quit smoking about 46 years ago. She has never used smokeless tobacco. She reports current alcohol use. She reports that she does not use drugs. Family History:   Family History   Problem Relation Age of Onset    Colon Cancer Brother             Diabetes Father     Bipolar Disorder Mother     Osteoporosis Mother    Betsy Latin Migraines Mother        Vitals:  /65   Pulse 62   Temp 98.6 °F (37 °C) (Oral)   Resp 12   Ht 5' 9\" (1.753 m)   Wt 143 lb (64.9 kg)   SpO2 97%   BMI 21.12 kg/m²   Temp (24hrs), Av.4 °F (36.9 °C), Min:97.6 °F (36.4 °C), Max:99.1 °F (37.3 °C)    No results for input(s): POCGLU in the last 72 hours. I/O (24Hr):     Intake/Output Summary (Last 24 hours) at 2020 1653  Last data filed at 2020 1343  Gross per 24 hour   Intake 480 ml   Output --   Net 480 ml       Labs:  Hematology:  Recent Labs     20  1506   WBC 2.7*   RBC 3.46*   HGB 10.9*   HCT 34.5*   MCV 99.7   MCH 31.5   MCHC 31.6   RDW 13.1   PLT See Reflexed IPF Result   MPV NOT REPORTED   CRP 4.2     Chemistry:  Recent Labs     20  1506      K 4.3      CO2 28   GLUCOSE 168*   BUN 24*   CREATININE 0.82   MG 2.0   ANIONGAP 11   LABGLOM >60   GFRAA >60   CALCIUM 8.2*   PHOS 4.7*     Recent Labs     20  1506   PROT 5.3*   LABALBU 3.1*  3.1*   AST 70*   ALT 73*   ALKPHOS 59   BILITOT 0.27*   BILIDIR 0.10     ABG:No results found for: POCPH, PHART, PH, POCPCO2, HDP8OXI, PCO2, POCPO2, PO2ART, PO2, POCHCO3, YEX6WDL, HCO3, NBEA, PBEA, BEART, BE, THGBART, THB, OAJ9FJT, GERT2KEJ, N9YSXBAU, O2SAT, FIO2  Lab Results   Component Value Date/Time    SPECIAL NOT REPORTED 2020 08:31 PM     Lab Results   Component Value Date/Time    CULTURE NO GROWTH 2020 08:31 PM Radiology:  Xr Chest Portable    Result Date: 9/4/2020  No acute pulmonary disease. Calcific atherosclerotic disease aorta. Physical Examination:        General appearance: Sleepy but arousable, cooperative and no distress  Mental Status:  oriented to person, place and time and normal affect  Lungs:  clear to auscultation bilaterally, normal effort  Heart:  regular rate and rhythm, no murmur  Abdomen:  soft, nontender, nondistended, normal bowel sounds, no masses, hepatomegaly, splenomegaly  Extremities:  no edema, redness, tenderness in the calves  Skin:  no gross lesions, rashes, induration    Assessment:        Hospital Problems           Last Modified POA    * (Principal) COVID-19 virus infection 9/4/2020 Yes    Essential hypertension 9/4/2020 Yes    Thrombocytopenia ([address] 9/4/2020 Yes    Elevated troponin 9/5/2020 Yes    Elevated brain natriuretic peptide (BNP) level 9/5/2020 Yes          Plan:        COVID-19 infection: No further work-up, patient first COVID positive test was 9/4/2020, patient did not receive Remdesivir Decadron or plasma.   We will discuss discontinuation of COVID precautions tomorrow    Essential hypertension: Continue lisinopril 10 mg daily     Dementia/cognitive impairment: Continue chronic medication     Thrombocytopenia monitor closely: Okay for DVT prophylaxis for now    -Fatigue improved from yesterday, no infiltrates on chest x-ray    Marybel Hardy DO  9/13/2020  4:53 PM

## 2020-09-13 NOTE — PLAN OF CARE
Problem: Airway Clearance - Ineffective  Goal: Achieve or maintain patent airway  9/13/2020 1025 by Elle Newman  Outcome: Ongoing  9/13/2020 0628 by Meghna Chester RN  Outcome: Ongoing  9/12/2020 2033 by Elle Newman  Outcome: Ongoing     Problem: Gas Exchange - Impaired  Goal: Absence of hypoxia  9/13/2020 1025 by Elle Newman  Outcome: Ongoing  9/13/2020 0628 by Meghna Chester RN  Outcome: Ongoing  9/12/2020 2033 by Elle Newman  Outcome: Ongoing  Goal: Promote optimal lung function  9/13/2020 1025 by Elle Newman  Outcome: Ongoing  9/13/2020 0628 by Meghna Chester RN  Outcome: Ongoing  9/12/2020 2033 by Elle Newman  Outcome: Ongoing     Problem: Breathing Pattern - Ineffective  Goal: Ability to achieve and maintain a regular respiratory rate  9/13/2020 1025 by Elle Newman  Outcome: Ongoing  9/13/2020 0628 by Meghna Chester RN  Outcome: Ongoing  9/12/2020 2033 by Elle Newman  Outcome: Ongoing     Problem:  Body Temperature -  Risk of, Imbalanced  Goal: Ability to maintain a body temperature within defined limits  9/13/2020 1025 by Elle Newman  Outcome: Ongoing  9/13/2020 0628 by Meghna Chester RN  Outcome: Ongoing  9/12/2020 2033 by Elle Newman  Outcome: Ongoing  Goal: Will regain or maintain usual level of consciousness  9/13/2020 1025 by Elle Newman  Outcome: Ongoing  9/13/2020 0628 by Meghna Chester RN  Outcome: Ongoing  9/12/2020 2033 by Elle Newman  Outcome: Ongoing  Goal: Complications related to the disease process, condition or treatment will be avoided or minimized  9/13/2020 1025 by Elle Newman  Outcome: Ongoing  9/13/2020 0628 by Meghna Chester RN  Outcome: Ongoing  9/12/2020 2033 by Elle Newman  Outcome: Ongoing     Problem: Isolation Precautions - Risk of Spread of Infection  Goal: Prevent transmission of infection  9/13/2020 1025 by Elle Newman  Outcome: Ongoing  9/13/2020 9239 by Rach Vasquez RN  Outcome: Ongoing  9/12/2020 2033 by Anastasia Ackerman  Outcome: Ongoing     Problem: Nutrition Deficits  Goal: Optimize nutrtional status  9/13/2020 1025 by Anastasia Ackerman  Outcome: Ongoing  9/13/2020 0628 by Rach Vasquez RN  Outcome: Ongoing  9/12/2020 2033 by Anastasia Ackerman  Outcome: Ongoing     Problem: Risk for Fluid Volume Deficit  Goal: Maintain normal heart rhythm  9/13/2020 1025 by Anastasia Ackerman  Outcome: Ongoing  9/13/2020 0628 by Rach Vasquez RN  Outcome: Ongoing  9/12/2020 2033 by Anastasia Ackerman  Outcome: Ongoing  Goal: Maintain absence of muscle cramping  9/13/2020 1025 by Anastasia Ackerman  Outcome: Ongoing  9/13/2020 0628 by Rach Vasquez RN  Outcome: Ongoing  9/12/2020 2033 by Anastasia Ackerman  Outcome: Ongoing  Goal: Maintain normal serum potassium, sodium, calcium, phosphorus, and pH  9/13/2020 1025 by Anastasia Ackerman  Outcome: Ongoing  9/13/2020 0628 by Rach Vasquez RN  Outcome: Ongoing  9/12/2020 2033 by Anastasia Ackerman  Outcome: Ongoing     Problem: Loneliness or Risk for Loneliness  Goal: Demonstrate positive use of time alone when socialization is not possible  9/13/2020 1025 by Anastasia Ackerman  Outcome: Ongoing  9/13/2020 0628 by Rach Vasquez RN  Outcome: Ongoing  9/12/2020 2033 by Anastasia Ackerman  Outcome: Ongoing     Problem: Fatigue  Goal: Verbalize increase energy and improved vitality  9/13/2020 1025 by Anastasia Ackerman  Outcome: Ongoing  9/13/2020 0628 by Rach Vasquez RN  Outcome: Ongoing  9/12/2020 2033 by Anastasia Ackerman  Outcome: Ongoing     Problem: Patient Education: Go to Patient Education Activity  Goal: Patient/Family Education  9/13/2020 1025 by Anastasia Ackerman  Outcome: Ongoing  9/13/2020 0628 by Rach Vasquez RN  Outcome: Ongoing  9/12/2020 2033 by Anastasia Ackerman  Outcome: Ongoing     Problem: Falls - Risk of:  Goal: Will remain free from falls  Description: Will remain free from falls  9/13/2020 1025 by Bogdan Ascencio  Outcome: Ongoing  9/13/2020 0628 by Syed Jones RN  Outcome: Ongoing  9/12/2020 2033 by Bogdan Ascencio  Outcome: Ongoing  Goal: Absence of physical injury  Description: Absence of physical injury  9/13/2020 1025 by Bogdan Ascencio  Outcome: Ongoing  9/13/2020 0628 by Syed Jones RN  Outcome: Ongoing  9/12/2020 2033 by Bogdan Ascencio  Outcome: Ongoing     Problem: Skin Integrity:  Goal: Will show no infection signs and symptoms  Description: Will show no infection signs and symptoms  9/13/2020 1025 by Bogdan Ascencio  Outcome: Ongoing  9/13/2020 0628 by Syed Jones RN  Outcome: Ongoing  9/12/2020 2033 by Bogdan Ascencio  Outcome: Ongoing  Goal: Absence of new skin breakdown  Description: Absence of new skin breakdown  9/13/2020 1025 by Bogdan Ascencio  Outcome: Ongoing  9/13/2020 0628 by Syed Jones RN  Outcome: Ongoing  9/12/2020 2033 by Bogdan Ascencio  Outcome: Ongoing

## 2020-09-13 NOTE — PLAN OF CARE
Problem: Airway Clearance - Ineffective  Goal: Achieve or maintain patent airway  9/13/2020 0628 by Reese Norris RN  Outcome: Ongoing  9/12/2020 2033 by Korin Maher  Outcome: Ongoing     Problem: Gas Exchange - Impaired  Goal: Absence of hypoxia  9/13/2020 0628 by Reese Norris RN  Outcome: Ongoing  9/12/2020 2033 by Korin Maher  Outcome: Ongoing  Goal: Promote optimal lung function  9/13/2020 0628 by Reese Norris RN  Outcome: Ongoing  9/12/2020 2033 by Korin Maher  Outcome: Ongoing     Problem: Breathing Pattern - Ineffective  Goal: Ability to achieve and maintain a regular respiratory rate  9/13/2020 0628 by Reese Norris RN  Outcome: Ongoing  9/12/2020 2033 by Korin Maher  Outcome: Ongoing     Problem:  Body Temperature -  Risk of, Imbalanced  Goal: Ability to maintain a body temperature within defined limits  9/13/2020 0628 by Reese Norris RN  Outcome: Ongoing  9/12/2020 2033 by Korin Maher  Outcome: Ongoing  Goal: Will regain or maintain usual level of consciousness  9/13/2020 0628 by Reese Norris RN  Outcome: Ongoing  9/12/2020 2033 by Korin Maher  Outcome: Ongoing  Goal: Complications related to the disease process, condition or treatment will be avoided or minimized  9/13/2020 0628 by Reese Norris RN  Outcome: Ongoing  9/12/2020 2033 by Korin Maher  Outcome: Ongoing     Problem: Isolation Precautions - Risk of Spread of Infection  Goal: Prevent transmission of infection  9/13/2020 0628 by Reese Norris RN  Outcome: Ongoing  9/12/2020 2033 by Korin Maher  Outcome: Ongoing     Problem: Nutrition Deficits  Goal: Optimize nutrtional status  9/13/2020 0628 by Reese Norris RN  Outcome: Ongoing  9/12/2020 2033 by Korin Maher  Outcome: Ongoing     Problem: Risk for Fluid Volume Deficit  Goal: Maintain normal heart rhythm  9/13/2020 0628 by Reese Norris RN  Outcome: Ongoing  9/12/2020 2033 by Viviana Huitron  Outcome: Ongoing  Goal: Maintain absence of muscle cramping  9/13/2020 0628 by Rondal Dakin, RN  Outcome: Ongoing  9/12/2020 2033 by Viviana Huitron  Outcome: Ongoing  Goal: Maintain normal serum potassium, sodium, calcium, phosphorus, and pH  9/13/2020 0628 by Rondal Dakin, RN  Outcome: Ongoing  9/12/2020 2033 by Viviana Huitron  Outcome: Ongoing     Problem: Loneliness or Risk for Loneliness  Goal: Demonstrate positive use of time alone when socialization is not possible  9/13/2020 0628 by Rondal Dakin, RN  Outcome: Ongoing  9/12/2020 2033 by Viviana Huitron  Outcome: Ongoing     Problem: Fatigue  Goal: Verbalize increase energy and improved vitality  9/13/2020 0628 by Rondal Dakin, RN  Outcome: Ongoing  9/12/2020 2033 by Viviana Huitron  Outcome: Ongoing     Problem: Patient Education: Go to Patient Education Activity  Goal: Patient/Family Education  9/13/2020 0628 by Rondal Dakin, RN  Outcome: Ongoing  9/12/2020 2033 by Viviana Huitron  Outcome: Ongoing     Problem: Falls - Risk of:  Goal: Will remain free from falls  Description: Will remain free from falls  9/13/2020 0628 by Rondal Dakin, RN  Outcome: Ongoing  9/12/2020 2033 by Viviana Huitron  Outcome: Ongoing  Goal: Absence of physical injury  Description: Absence of physical injury  9/13/2020 0628 by Rondal Dakin, RN  Outcome: Ongoing  9/12/2020 2033 by Viviana Huitron  Outcome: Ongoing     Problem: Skin Integrity:  Goal: Will show no infection signs and symptoms  Description: Will show no infection signs and symptoms  9/13/2020 0628 by Rondal Dakin, RN  Outcome: Ongoing  9/12/2020 2033 by Viviana Huitron  Outcome: Ongoing  Goal: Absence of new skin breakdown  Description: Absence of new skin breakdown  9/13/2020 0628 by Rondal Dakin, RN  Outcome: Ongoing  9/12/2020 2033 by Viviana Huitron  Outcome: Ongoing

## 2020-09-14 VITALS
SYSTOLIC BLOOD PRESSURE: 112 MMHG | WEIGHT: 143 LBS | HEART RATE: 79 BPM | TEMPERATURE: 98.4 F | OXYGEN SATURATION: 98 % | DIASTOLIC BLOOD PRESSURE: 64 MMHG | RESPIRATION RATE: 19 BRPM | BODY MASS INDEX: 21.18 KG/M2 | HEIGHT: 69 IN

## 2020-09-14 PROCEDURE — 6370000000 HC RX 637 (ALT 250 FOR IP): Performed by: INTERNAL MEDICINE

## 2020-09-14 PROCEDURE — 2580000003 HC RX 258: Performed by: INTERNAL MEDICINE

## 2020-09-14 PROCEDURE — 6370000000 HC RX 637 (ALT 250 FOR IP): Performed by: FAMILY MEDICINE

## 2020-09-14 PROCEDURE — 6360000002 HC RX W HCPCS: Performed by: INTERNAL MEDICINE

## 2020-09-14 PROCEDURE — 99239 HOSP IP/OBS DSCHRG MGMT >30: CPT | Performed by: INTERNAL MEDICINE

## 2020-09-14 PROCEDURE — 97110 THERAPEUTIC EXERCISES: CPT

## 2020-09-14 PROCEDURE — 97116 GAIT TRAINING THERAPY: CPT

## 2020-09-14 PROCEDURE — 97535 SELF CARE MNGMENT TRAINING: CPT

## 2020-09-14 RX ORDER — LISINOPRIL 10 MG/1
10 TABLET ORAL DAILY
Qty: 30 TABLET | Refills: 3 | Status: SHIPPED | OUTPATIENT
Start: 2020-09-14 | End: 2020-09-14 | Stop reason: HOSPADM

## 2020-09-14 RX ADMIN — FAMOTIDINE 20 MG: 20 TABLET, FILM COATED ORAL at 07:45

## 2020-09-14 RX ADMIN — CITALOPRAM 20 MG: 20 TABLET, FILM COATED ORAL at 07:45

## 2020-09-14 RX ADMIN — VITAMIN D, TAB 1000IU (100/BT) 2000 UNITS: 25 TAB at 07:45

## 2020-09-14 RX ADMIN — FLUTICASONE PROPIONATE 1 SPRAY: 50 SPRAY, METERED NASAL at 07:46

## 2020-09-14 RX ADMIN — MULTIPLE VITAMINS W/ MINERALS TAB 1 TABLET: TAB at 07:45

## 2020-09-14 RX ADMIN — ACETAMINOPHEN 650 MG: 325 TABLET ORAL at 10:18

## 2020-09-14 RX ADMIN — CETIRIZINE HYDROCHLORIDE 10 MG: 10 TABLET ORAL at 07:45

## 2020-09-14 RX ADMIN — ENOXAPARIN SODIUM 30 MG: 30 INJECTION SUBCUTANEOUS at 07:45

## 2020-09-14 RX ADMIN — ARIPIPRAZOLE 5 MG: 5 TABLET ORAL at 07:45

## 2020-09-14 RX ADMIN — SODIUM CHLORIDE, PRESERVATIVE FREE 10 ML: 5 INJECTION INTRAVENOUS at 07:45

## 2020-09-14 RX ADMIN — Medication 10 ML: at 07:46

## 2020-09-14 RX ADMIN — Medication 10 ML: at 12:30

## 2020-09-14 ASSESSMENT — PAIN - FUNCTIONAL ASSESSMENT
PAIN_FUNCTIONAL_ASSESSMENT: ACTIVITIES ARE NOT PREVENTED
PAIN_FUNCTIONAL_ASSESSMENT: ACTIVITIES ARE NOT PREVENTED

## 2020-09-14 ASSESSMENT — ENCOUNTER SYMPTOMS
SHORTNESS OF BREATH: 0
WHEEZING: 0
BLOOD IN STOOL: 0
VOMITING: 0
CONSTIPATION: 0
DIARRHEA: 0
COUGH: 0
ABDOMINAL PAIN: 0
NAUSEA: 0
CHEST TIGHTNESS: 0

## 2020-09-14 ASSESSMENT — PAIN DESCRIPTION - FREQUENCY
FREQUENCY: INTERMITTENT

## 2020-09-14 ASSESSMENT — PAIN DESCRIPTION - PAIN TYPE
TYPE: ACUTE PAIN
TYPE: CHRONIC PAIN

## 2020-09-14 ASSESSMENT — PAIN SCALES - WONG BAKER: WONGBAKER_NUMERICALRESPONSE: 4

## 2020-09-14 ASSESSMENT — PAIN SCALES - GENERAL
PAINLEVEL_OUTOF10: 6
PAINLEVEL_OUTOF10: 0
PAINLEVEL_OUTOF10: 6
PAINLEVEL_OUTOF10: 6
PAINLEVEL_OUTOF10: 0

## 2020-09-14 ASSESSMENT — PAIN DESCRIPTION - ORIENTATION
ORIENTATION: LOWER
ORIENTATION: MID
ORIENTATION: LOWER;MID

## 2020-09-14 ASSESSMENT — PAIN DESCRIPTION - LOCATION
LOCATION: BACK

## 2020-09-14 ASSESSMENT — PAIN DESCRIPTION - ONSET: ONSET: ON-GOING

## 2020-09-14 ASSESSMENT — PAIN DESCRIPTION - DESCRIPTORS
DESCRIPTORS: ACHING
DESCRIPTORS: ACHING
DESCRIPTORS: ACHING;DISCOMFORT

## 2020-09-14 ASSESSMENT — PAIN DESCRIPTION - PROGRESSION
CLINICAL_PROGRESSION: NOT CHANGED
CLINICAL_PROGRESSION: NOT CHANGED

## 2020-09-14 NOTE — PLAN OF CARE
Problem: Airway Clearance - Ineffective  Goal: Achieve or maintain patent airway  9/14/2020 0929 by Kevin Carrasco  Outcome: Ongoing  9/14/2020 0559 by Marium Kelley RN  Outcome: Ongoing     Problem: Gas Exchange - Impaired  Goal: Absence of hypoxia  9/14/2020 0929 by Kevin Carrasco  Outcome: Ongoing  9/14/2020 0559 by Marium Kelley RN  Outcome: Ongoing  Goal: Promote optimal lung function  9/14/2020 0929 by Kevin Carrasco  Outcome: Ongoing  9/14/2020 0559 by Marium Kelley RN  Outcome: Ongoing     Problem: Breathing Pattern - Ineffective  Goal: Ability to achieve and maintain a regular respiratory rate  9/14/2020 0929 by Kevin Carrasco  Outcome: Ongoing  9/14/2020 0559 by Marium Kelley RN  Outcome: Ongoing     Problem:  Body Temperature -  Risk of, Imbalanced  Goal: Ability to maintain a body temperature within defined limits  9/14/2020 0929 by Kevin Carrasco  Outcome: Ongoing  9/14/2020 0559 by Marium Kelley RN  Outcome: Ongoing  Goal: Will regain or maintain usual level of consciousness  9/14/2020 0929 by Kevin Carrasco  Outcome: Ongoing  9/14/2020 0559 by Marium Kelley RN  Outcome: Ongoing  Goal: Complications related to the disease process, condition or treatment will be avoided or minimized  9/14/2020 0929 by Kevin Carrasco  Outcome: Ongoing  9/14/2020 0559 by Marium Kelley RN  Outcome: Ongoing     Problem: Isolation Precautions - Risk of Spread of Infection  Goal: Prevent transmission of infection  9/14/2020 0929 by Kevin Carrasco  Outcome: Ongoing  9/14/2020 0559 by Marium Kelley RN  Outcome: Ongoing     Problem: Nutrition Deficits  Goal: Optimize nutrtional status  9/14/2020 0929 by Kevin Carrasco  Outcome: Ongoing  9/14/2020 0559 by Marium Kelley RN  Outcome: Ongoing     Problem: Risk for Fluid Volume Deficit  Goal: Maintain normal heart rhythm  9/14/2020 0929 by Kevin Carrasco  Outcome: Ongoing  9/14/2020

## 2020-09-14 NOTE — PROGRESS NOTES
Safe handoff and report given to [facility]. All questions and concerns addressed. Assisted patient to bathroom, applied fresh brief. Assisted patient to EMS gurney. Patient left in no acute distress.

## 2020-09-14 NOTE — PROGRESS NOTES
initial chest x-ray was not remarkable for any acute pathology. He was transferred to our facility for further management  Currently she denies any chest pain, sob, nausea, vomiting, fever or chills   She is hypoxic . Review of Systems:     Review of Systems   Constitutional: Negative for chills, diaphoresis and fever. HENT: Negative for congestion. Eyes: Negative for visual disturbance. Respiratory: Negative for cough, chest tightness, shortness of breath and wheezing. Cardiovascular: Negative for chest pain, palpitations and leg swelling. Gastrointestinal: Negative for abdominal pain, blood in stool, constipation, diarrhea, nausea and vomiting. Genitourinary: Negative for difficulty urinating. Neurological: Negative for dizziness, weakness, light-headedness, numbness and headaches. All other systems reviewed and are negative. Medications:      Allergies:  No Known Allergies    Current Meds:   Scheduled Meds:    fluticasone  1 spray Each Nostril Daily    cetirizine  10 mg Oral Daily    famotidine  20 mg Oral Daily    donepezil  10 mg Oral Nightly    Vitamin D  2,000 Units Oral Daily    therapeutic multivitamin-minerals  1 tablet Oral Daily    sodium chloride flush  10 mL Intravenous 2 times per day    enoxaparin  30 mg Subcutaneous BID    ARIPiprazole  5 mg Oral Daily    citalopram  20 mg Oral Daily     Continuous Infusions:   PRN Meds: dextromethorphan-guaiFENesin, melatonin, bisacodyl, polyethylene glycol, sodium chloride flush, potassium chloride **OR** potassium alternative oral replacement **OR** potassium chloride, magnesium sulfate, acetaminophen **OR** acetaminophen, promethazine **OR** ondansetron, nicotine, senna    Data:     Past Medical History:   has a past medical history of Anemia, Anxiety, Naranjo disease, Bradycardia, Cataracts, bilateral, Colon polyp, Diverticulosis, Fibroid, Forgetfulness, Gait disturbance, Gall stones, Headache(784.0), Hearing loss, HTN Radiology:  Xr Chest Portable    Result Date: 9/4/2020  No acute pulmonary disease. Calcific atherosclerotic disease aorta. Physical Examination:        General appearance: Sleepy but arousable, cooperative and no distress  Mental Status:  oriented to person, place and time and normal affect  Lungs:  clear to auscultation bilaterally, normal effort  Heart:  regular rate and rhythm, no murmur  Abdomen:  soft, nontender, nondistended, normal bowel sounds, no masses, hepatomegaly, splenomegaly  Extremities:  no edema, redness, tenderness in the calves  Skin:  no gross lesions, rashes, induration    Assessment:        Hospital Problems           Last Modified POA    * (Principal) COVID-19 virus infection 9/4/2020 Yes    Essential hypertension 9/4/2020 Yes    Thrombocytopenia ([address] 9/4/2020 Yes    Elevated troponin 9/5/2020 Yes    Elevated brain natriuretic peptide (BNP) level 9/5/2020 Yes          Plan:        COVID-19 infection: No further workup. Discharge today    Essential hypertension: Continue lisinopril 10 mg daily     Dementia/cognitive impairment: Continue chronic medication     Thrombocytopenia monitor closely: Okay for DVT prophylaxis for now    - Discharge today, no changes otherwise.      Daljit Godinez DO  9/14/2020  10:06 AM

## 2020-09-14 NOTE — PLAN OF CARE
Problem: Airway Clearance - Ineffective  Goal: Achieve or maintain patent airway  9/14/2020 0559 by Rebekah Mazariegos RN  Outcome: Ongoing  9/14/2020 0559 by Rebekah Mazariegos RN  Outcome: Ongoing     Problem: Gas Exchange - Impaired  Goal: Absence of hypoxia  9/14/2020 0559 by Rebekah Mazariegos RN  Outcome: Ongoing  9/14/2020 0559 by Rebekah Mazariegos RN  Outcome: Ongoing  Goal: Promote optimal lung function  9/14/2020 0559 by Rebekah Mazariegos RN  Outcome: Ongoing  9/14/2020 0559 by Rebekah Mazariegos RN  Outcome: Ongoing     Problem: Breathing Pattern - Ineffective  Goal: Ability to achieve and maintain a regular respiratory rate  9/14/2020 0559 by Rebekah Mazariegos RN  Outcome: Ongoing  9/14/2020 0559 by Rebekah Mazariegos RN  Outcome: Ongoing     Problem:  Body Temperature -  Risk of, Imbalanced  Goal: Ability to maintain a body temperature within defined limits  9/14/2020 0559 by Rebekah Mazariegos RN  Outcome: Ongoing  9/14/2020 0559 by Rebekah Mazariegos RN  Outcome: Ongoing  Goal: Will regain or maintain usual level of consciousness  9/14/2020 0559 by Rebekah Mazariegos RN  Outcome: Ongoing  9/14/2020 0559 by Rebekah Mazariegos RN  Outcome: Ongoing  Goal: Complications related to the disease process, condition or treatment will be avoided or minimized  9/14/2020 0559 by Rebekah Mazariegos RN  Outcome: Ongoing  9/14/2020 0559 by Rebekah Mazariegos RN  Outcome: Ongoing     Problem: Isolation Precautions - Risk of Spread of Infection  Goal: Prevent transmission of infection  9/14/2020 0559 by Rebekah Mazariegos RN  Outcome: Ongoing  9/14/2020 0559 by Rebekah Mazariegos RN  Outcome: Ongoing     Problem: Nutrition Deficits  Goal: Optimize nutrtional status  9/14/2020 0559 by Rebekah Mazariegos RN  Outcome: Ongoing  9/14/2020 0559 by Rebekah Mazariegos RN  Outcome: Ongoing     Problem: Risk for Fluid Volume Deficit  Goal: Maintain

## 2020-09-14 NOTE — PROGRESS NOTES
Occupational Therapy  Facility/Department: [facility]  Daily Treatment Note  NAME: Chelsea Schlatter Cousino  : 10/28/1932  MRN: [id number]    Date of Service: 2020    Discharge Recommendations:  Patient would benefit from continued therapy after discharge       Assessment   Performance deficits / Impairments: Decreased functional mobility ; Decreased ADL status; Decreased strength;Decreased endurance;Decreased balance;Decreased high-level IADLs  Treatment Diagnosis: COVID +  Prognosis: Good  Patient Education: OT POC, transfer /walker safety, importance of participation in therapy, EC/WS, pursed lip breathing - pt verbalized understanding. Activity Tolerance  Activity Tolerance: Patient Tolerated treatment well  Safety Devices  Safety Devices in place: Yes  Type of devices: Call light within reach; Chair alarm in place;Gait belt;Patient at risk for falls; Left in chair;Nurse notified         Patient Diagnosis(es): There were no encounter diagnoses. has a past medical history of Anemia, Anxiety, Naranjo disease, Bradycardia, Cataracts, bilateral, Colon polyp, Diverticulosis, Fibroid, Forgetfulness, Gait disturbance, Gall stones, Headache(784.0), Hearing loss, HTN (hypertension), Insomnia, Lichen sclerosus, Osteoporosis, Other activity(E029.9), Pap smear for cervical cancer screening, Pneumonia, Vaginal atrophy, and Vaginal discharge. has a past surgical history that includes vulvar/perineal biopsy; Cholecystectomy; blepharoplasty; other surgical history; Hysterectomy; Breast biopsy; joint replacement (); and Kidney cyst removal.    Restrictions  Restrictions/Precautions  Restrictions/Precautions: Fall Risk, Isolation  Required Braces or Orthoses?: No  Position Activity Restriction  Other position/activity restrictions: up with assist. O2 NC 2 Lm.   Subjective   General  Patient assessed for rehabilitation services?: Yes  Family / Caregiver Present: No  General Comment  Pain Assessment  Pain Assessment: Faces  Diaz-Baker Pain Rating: Hurts little more  Pain Type: Chronic pain  Pain Location: Back  Pain Orientation: Lower  Pain Descriptors: Aching;Discomfort  Pain Frequency: Intermittent  Non-Pharmaceutical Pain Intervention(s): Repositioned;Rest;Therapeutic presence  Vital Signs  Patient Currently in Pain: Yes   Orientation  Orientation  Overall Orientation Status: Within Functional Limits  Objective    ADL  Feeding: Setup; Independent(Pt able to peel banana and eat it.)  Grooming: Setup; Increased time to complete;Stand by assistance  UE Bathing: Setup; Increased time to complete;Contact guard assistance(Max A for back seated in chair.)  LE Bathing: Minimal assistance;Setup(Hips to knees standing at chair.)  UE Dressing: Minimal assistance(To manage gown.)  LE Dressing: Moderate assistance  Toileting: Setup;Minimal assistance(Pericare standing at chair.)  Additional Comments: Pt completed ADL care seated in chair, pericare standing at chair. Balance  Sitting Balance: Supervision(Seated in chair.)  Standing Balance: Contact guard assistance  Standing Balance  Time: 4 minutes  Activity: Pericare standing at chair using RW. Transfers  Sit to stand: Contact guard assistance  Stand to sit: Contact guard assistance  Transfer Comments: Verbal cues for hand placement to push off from arm of chair with good return.       Cognition  Overall Cognitive Status: [facility]     Plan   Plan  Times per week: 3-4 x/wk  Current Treatment Recommendations: Balance Training, Functional Mobility Training, Endurance Training, Safety Education & Training, Patient/Caregiver Education & Training, Equipment Evaluation, Education, & procurement, Self-Care / ADL    Goals  Short term goals  Time Frame for Short term goals: By discharge, pt will:  Short term goal 1: Demo Mod I with use of LRD for functional transfers and functional mobility  Short term goal 2: Demo I with setup for UB ADLs and grooming tasks  Short term goal 3: Demo I with setup for LB ADLs and toileting tasks  Short term goal 4: Demo 8+ minutes dynamic standing to increase safety and independence with ADLs/IADLs  Short term goal 5: Demo I with V/D 2+ EC/WS techniques to incorporate into daily routine       Therapy Time   Individual Concurrent Group Co-treatment   Time In 1045         Time Out 1127         Minutes 42         Timed Code Treatment Minutes: 42 Minutes     Pt seated in chair upon therapist arrival. Pleasant and agreeable to therapy. See above for LOF for all tasks. Pt retired to seated in chair at end of session with chair alarm activated and call light within reach.     JUAN Meek/ANNIE

## 2020-09-14 NOTE — PROGRESS NOTES
No  Subjective  Subjective: Pt and RN agreeable to PT. Pt alert in recliner upon arrival, requesting assistance to the restroom. Pt pleasant and cooperative with treatment, c/o chronic back pain. General Comment  Comments: Pt left in recliner with call light within reach, alarm activated  Pain Screening  Patient Currently in Pain: Yes  Pain Assessment  Pain Assessment: 0-10  Pain Level: 6  Pain Location: Back  Pain Orientation: Lower;Mid  Pain Descriptors: Aching  Pain Frequency: Intermittent  Clinical Progression: Not changed  Functional Pain Assessment: Activities are not prevented  Non-Pharmaceutical Pain Intervention(s): Ambulation/Increased Activity;Repositioned  Vital Signs  Patient Currently in Pain: Yes       Orientation  Orientation  Overall Orientation Status: Within Functional Limits  Cognition      Objective   Bed mobility  Scooting: Stand by assistance  Comment: pt seated in recliner upon arrival and retuned to chair after amb  Transfers  Sit to Stand: Contact guard assistance  Stand to sit: Contact guard assistance  Comment: Increased time to complete, vc's for UE placement with good return demonstration  Ambulation  Ambulation?: Yes  Ambulation 1  Surface: level tile  Device: Rolling Walker  Assistance: Contact guard assistance  Quality of Gait: decreased froilan, flexed posture, small shuffled steps  Gait Deviations: Slow Froilan; Shuffles;Decreased step length  Distance: 40 ft  Comments: increased time to complete, slow cautious gait  Stairs/Curb  Stairs?: No     Balance  Posture: Good  Sitting - Static: Good  Sitting - Dynamic: Good  Standing - Static: Fair;+  Standing - Dynamic: Fair  Comments: RW used while assessing standing balance  Exercises  Quad Sets: 15x bilat  Heelslides: 15x bilat  Gluteal Sets: 15x bilat  Hip Flexion: 15x bilat  Hip Abduction: 15x bilat  Knee Long Arc Quad: 15x bilat  Ankle Pumps: 15x bilat  Upper Extremity: bicep curls, punches and shoulder flexion x10 reps each Goals  Short term goals  Time Frame for Short term goals: 14 visits  Short term goal 1: Pt will be Yareli bed mobility  Short term goal 2: Pt will be Yareli transfers  Short term goal 3: Pt will be Yareli amb 200' RW or least restrictive AD  Short term goal 4: Pt will navigate 2 steps Yareli    Plan    Plan  Times per week: 3-5x/wk  Current Treatment Recommendations: Strengthening, Balance Training, Functional Mobility Training, Transfer Training, Gait Training, Endurance Training, Home Exercise Program, Safety Education & Training, Patient/Caregiver Education & Training, Equipment Evaluation, Education, & procurement, Stair training  Safety Devices  Type of devices: Call light within reach, Nurse notified, Gait belt, Patient at risk for falls, Left in chair, All fall risk precautions in place, Chair alarm in place     Therapy Time   Individual Concurrent Group Co-treatment   Time In 0943         Time Out 1016         Minutes 33         Timed Code Treatment Minutes: [address]

## 2020-09-14 NOTE — PROGRESS NOTES
Report called to Rashel Chisholm LPN at [address] [place] [address]. All questions and concerns addressed.

## 2020-09-14 NOTE — CARE COORDINATION
TRANSITIONAL CARE PLANNING/ 2 [facility]: 9    Reason for Admission: COVID-19 [U07.1]     Treatment Plan of Care:  Full code       Barriers: snf placement      Readmission Risk            Risk of Unplanned Readmission:      21            Patient goals/Treatment Preferences/Transitional Plan: called [facility] spoke to [facility] accepting no admissions at this time   Called [facility] spoke to travis pope have to check on bed availability and call back  10:03 called ken @ [facility] can accept today called daughter miah with dr Saniya Marrero. Agreeable. Will arrange [facility] @ 13:00. Called patients cell phone left vmMaddie . Mary done  11:11 faxed avs to [facility]

## 2020-10-05 PROBLEM — R77.8 ELEVATED TROPONIN: Status: RESOLVED | Noted: 2020-09-05 | Resolved: 2020-10-05

## 2020-10-08 ENCOUNTER — HOSPITAL ENCOUNTER (OUTPATIENT)
Dept: MAMMOGRAPHY | Age: 85
Discharge: HOME | End: 2020-10-10
Payer: COMMERCIAL

## 2020-10-08 PROCEDURE — 77067 SCR MAMMO BI INCL CAD: CPT

## 2021-02-22 ENCOUNTER — APPOINTMENT (OUTPATIENT)
Dept: CT IMAGING | Age: 86
DRG: 280 | End: 2021-02-22
Payer: MEDICARE

## 2021-02-22 ENCOUNTER — APPOINTMENT (OUTPATIENT)
Dept: GENERAL RADIOLOGY | Age: 86
DRG: 280 | End: 2021-02-22
Payer: MEDICARE

## 2021-02-22 ENCOUNTER — HOSPITAL ENCOUNTER (INPATIENT)
Age: 86
LOS: 4 days | Discharge: NURSING, REHAB OR LONG-TERM CARE | DRG: 280 | End: 2021-02-26
Attending: EMERGENCY MEDICINE | Admitting: FAMILY MEDICINE
Payer: MEDICARE

## 2021-02-22 ENCOUNTER — APPOINTMENT (OUTPATIENT)
Dept: INTERVENTIONAL RADIOLOGY/VASCULAR | Age: 86
DRG: 280 | End: 2021-02-22
Payer: MEDICARE

## 2021-02-22 DIAGNOSIS — I21.4 NSTEMI (NON-ST ELEVATED MYOCARDIAL INFARCTION) (HCC): Primary | ICD-10-CM

## 2021-02-22 LAB
-: ABNORMAL
ABSOLUTE EOS #: 0 K/UL (ref 0–0.4)
ABSOLUTE IMMATURE GRANULOCYTE: 0.11 K/UL (ref 0–0.3)
ABSOLUTE LYMPH #: 0.33 K/UL (ref 1–4.8)
ABSOLUTE MONO #: 0.76 K/UL (ref 0.2–0.8)
AMORPHOUS: ABNORMAL
ANION GAP SERPL CALCULATED.3IONS-SCNC: 11 MMOL/L (ref 9–17)
ANTI-XA UNFRAC HEPARIN: 0.57 IU/L (ref 0.3–0.7)
BACTERIA: ABNORMAL
BASOPHILS # BLD: 0 %
BASOPHILS ABSOLUTE: 0 K/UL (ref 0–0.2)
BILIRUBIN URINE: NEGATIVE
BUN BLDV-MCNC: 25 MG/DL (ref 8–23)
BUN/CREAT BLD: 27 (ref 9–20)
CALCIUM SERPL-MCNC: 8.8 MG/DL (ref 8.6–10.4)
CASTS UA: ABNORMAL /LPF
CHLORIDE BLD-SCNC: 99 MMOL/L (ref 98–107)
CO2: 29 MMOL/L (ref 20–31)
COLOR: YELLOW
COMMENT UA: ABNORMAL
CREAT SERPL-MCNC: 0.93 MG/DL (ref 0.5–0.9)
CRYSTALS, UA: ABNORMAL /HPF
DIFFERENTIAL TYPE: ABNORMAL
EOSINOPHILS RELATIVE PERCENT: 0 % (ref 1–4)
EPITHELIAL CELLS UA: ABNORMAL /HPF (ref 0–5)
GFR AFRICAN AMERICAN: >60 ML/MIN
GFR NON-AFRICAN AMERICAN: 57 ML/MIN
GFR SERPL CREATININE-BSD FRML MDRD: ABNORMAL ML/MIN/{1.73_M2}
GFR SERPL CREATININE-BSD FRML MDRD: ABNORMAL ML/MIN/{1.73_M2}
GLUCOSE BLD-MCNC: 174 MG/DL (ref 70–99)
GLUCOSE URINE: NEGATIVE
HCT VFR BLD CALC: 35.3 % (ref 36.3–47.1)
HEMOGLOBIN: 11.3 G/DL (ref 11.9–15.1)
IMMATURE GRANULOCYTES: 1 %
KETONES, URINE: ABNORMAL
LACTIC ACID: 0.6 MMOL/L (ref 0.5–2.2)
LEUKOCYTE ESTERASE, URINE: ABNORMAL
LYMPHOCYTES # BLD: 3 % (ref 24–44)
MAGNESIUM: 1.8 MG/DL (ref 1.6–2.6)
MCH RBC QN AUTO: 30.3 PG (ref 25.2–33.5)
MCHC RBC AUTO-ENTMCNC: 32 G/DL (ref 28.4–34.8)
MCV RBC AUTO: 94.6 FL (ref 82.6–102.9)
MONOCYTES # BLD: 7 % (ref 1–7)
MUCUS: ABNORMAL
MYOGLOBIN: 192 NG/ML (ref 25–58)
MYOGLOBIN: 229 NG/ML (ref 25–58)
MYOGLOBIN: 92 NG/ML (ref 25–58)
NITRITE, URINE: POSITIVE
NRBC AUTOMATED: 0 PER 100 WBC
OTHER OBSERVATIONS UA: ABNORMAL
PARTIAL THROMBOPLASTIN TIME: 29.6 SEC (ref 23.9–33.8)
PDW BLD-RTO: 13.5 % (ref 11.8–14.4)
PH UA: 6 (ref 5–8)
PLATELET # BLD: 127 K/UL (ref 138–453)
PLATELET ESTIMATE: ABNORMAL
PMV BLD AUTO: 10.2 FL (ref 8.1–13.5)
POTASSIUM SERPL-SCNC: 4 MMOL/L (ref 3.7–5.3)
PROTEIN UA: ABNORMAL
RBC # BLD: 3.73 M/UL (ref 3.95–5.11)
RBC # BLD: ABNORMAL 10*6/UL
RBC UA: ABNORMAL /HPF (ref 0–2)
RENAL EPITHELIAL, UA: ABNORMAL /HPF
SARS-COV-2, RAPID: NOT DETECTED
SEG NEUTROPHILS: 89 % (ref 36–66)
SEGMENTED NEUTROPHILS ABSOLUTE COUNT: 9.7 K/UL (ref 1.8–7.7)
SODIUM BLD-SCNC: 139 MMOL/L (ref 135–144)
SPECIFIC GRAVITY UA: 1.01 (ref 1–1.03)
SPECIMEN DESCRIPTION: NORMAL
TRICHOMONAS: ABNORMAL
TROPONIN INTERP: ABNORMAL
TROPONIN T: ABNORMAL NG/ML
TROPONIN, HIGH SENSITIVITY: 460 NG/L (ref 0–14)
TROPONIN, HIGH SENSITIVITY: 585 NG/L (ref 0–14)
TROPONIN, HIGH SENSITIVITY: 698 NG/L (ref 0–14)
TURBIDITY: ABNORMAL
URINE HGB: ABNORMAL
UROBILINOGEN, URINE: NORMAL
WBC # BLD: 10.9 K/UL (ref 3.5–11.3)
WBC # BLD: ABNORMAL 10*3/UL
WBC UA: ABNORMAL /HPF (ref 0–5)
YEAST: ABNORMAL

## 2021-02-22 PROCEDURE — 85520 HEPARIN ASSAY: CPT

## 2021-02-22 PROCEDURE — 73502 X-RAY EXAM HIP UNI 2-3 VIEWS: CPT

## 2021-02-22 PROCEDURE — 36415 COLL VENOUS BLD VENIPUNCTURE: CPT

## 2021-02-22 PROCEDURE — 2500000003 HC RX 250 WO HCPCS: Performed by: EMERGENCY MEDICINE

## 2021-02-22 PROCEDURE — 83605 ASSAY OF LACTIC ACID: CPT

## 2021-02-22 PROCEDURE — 6360000004 HC RX CONTRAST MEDICATION: Performed by: EMERGENCY MEDICINE

## 2021-02-22 PROCEDURE — 85025 COMPLETE CBC W/AUTO DIFF WBC: CPT

## 2021-02-22 PROCEDURE — 77001 FLUOROGUIDE FOR VEIN DEVICE: CPT

## 2021-02-22 PROCEDURE — 87040 BLOOD CULTURE FOR BACTERIA: CPT

## 2021-02-22 PROCEDURE — 6370000000 HC RX 637 (ALT 250 FOR IP): Performed by: FAMILY MEDICINE

## 2021-02-22 PROCEDURE — 84484 ASSAY OF TROPONIN QUANT: CPT

## 2021-02-22 PROCEDURE — 6370000000 HC RX 637 (ALT 250 FOR IP): Performed by: EMERGENCY MEDICINE

## 2021-02-22 PROCEDURE — 05HM33Z INSERTION OF INFUSION DEVICE INTO RIGHT INTERNAL JUGULAR VEIN, PERCUTANEOUS APPROACH: ICD-10-PCS | Performed by: FAMILY MEDICINE

## 2021-02-22 PROCEDURE — 6360000002 HC RX W HCPCS: Performed by: EMERGENCY MEDICINE

## 2021-02-22 PROCEDURE — 36556 INSERT NON-TUNNEL CV CATH: CPT

## 2021-02-22 PROCEDURE — 2580000003 HC RX 258: Performed by: FAMILY MEDICINE

## 2021-02-22 PROCEDURE — 96374 THER/PROPH/DIAG INJ IV PUSH: CPT

## 2021-02-22 PROCEDURE — 51702 INSERT TEMP BLADDER CATH: CPT

## 2021-02-22 PROCEDURE — 74174 CTA ABD&PLVS W/CONTRAST: CPT

## 2021-02-22 PROCEDURE — C1751 CATH, INF, PER/CENT/MIDLINE: HCPCS

## 2021-02-22 PROCEDURE — 2000000000 HC ICU R&B

## 2021-02-22 PROCEDURE — 99284 EMERGENCY DEPT VISIT MOD MDM: CPT

## 2021-02-22 PROCEDURE — 93308 TTE F-UP OR LMTD: CPT

## 2021-02-22 PROCEDURE — U0002 COVID-19 LAB TEST NON-CDC: HCPCS

## 2021-02-22 PROCEDURE — 93005 ELECTROCARDIOGRAM TRACING: CPT | Performed by: EMERGENCY MEDICINE

## 2021-02-22 PROCEDURE — 85730 THROMBOPLASTIN TIME PARTIAL: CPT

## 2021-02-22 PROCEDURE — 76937 US GUIDE VASCULAR ACCESS: CPT

## 2021-02-22 PROCEDURE — 81001 URINALYSIS AUTO W/SCOPE: CPT

## 2021-02-22 PROCEDURE — 83874 ASSAY OF MYOGLOBIN: CPT

## 2021-02-22 PROCEDURE — 2580000003 HC RX 258: Performed by: EMERGENCY MEDICINE

## 2021-02-22 PROCEDURE — 83735 ASSAY OF MAGNESIUM: CPT

## 2021-02-22 PROCEDURE — 80048 BASIC METABOLIC PNL TOTAL CA: CPT

## 2021-02-22 RX ORDER — CITALOPRAM 20 MG/1
20 TABLET ORAL DAILY
Status: DISCONTINUED | OUTPATIENT
Start: 2021-02-22 | End: 2021-02-26 | Stop reason: HOSPADM

## 2021-02-22 RX ORDER — ACETAMINOPHEN 325 MG/1
650 TABLET ORAL EVERY 6 HOURS PRN
Status: DISCONTINUED | OUTPATIENT
Start: 2021-02-22 | End: 2021-02-23 | Stop reason: SDUPTHER

## 2021-02-22 RX ORDER — SODIUM CHLORIDE 0.9 % (FLUSH) 0.9 %
10 SYRINGE (ML) INJECTION EVERY 12 HOURS SCHEDULED
Status: DISCONTINUED | OUTPATIENT
Start: 2021-02-22 | End: 2021-02-23 | Stop reason: SDUPTHER

## 2021-02-22 RX ORDER — PROMETHAZINE HYDROCHLORIDE 12.5 MG/1
12.5 TABLET ORAL EVERY 6 HOURS PRN
Status: DISCONTINUED | OUTPATIENT
Start: 2021-02-22 | End: 2021-02-26 | Stop reason: HOSPADM

## 2021-02-22 RX ORDER — HEPARIN SODIUM 1000 [USP'U]/ML
60 INJECTION, SOLUTION INTRAVENOUS; SUBCUTANEOUS ONCE
Status: COMPLETED | OUTPATIENT
Start: 2021-02-22 | End: 2021-02-22

## 2021-02-22 RX ORDER — SODIUM CHLORIDE 0.9 % (FLUSH) 0.9 %
10 SYRINGE (ML) INJECTION PRN
Status: DISCONTINUED | OUTPATIENT
Start: 2021-02-22 | End: 2021-02-22 | Stop reason: SDUPTHER

## 2021-02-22 RX ORDER — HEPARIN SODIUM 10000 [USP'U]/100ML
12 INJECTION, SOLUTION INTRAVENOUS CONTINUOUS
Status: DISCONTINUED | OUTPATIENT
Start: 2021-02-22 | End: 2021-02-24

## 2021-02-22 RX ORDER — ACETAMINOPHEN 325 MG/1
650 TABLET ORAL ONCE
Status: COMPLETED | OUTPATIENT
Start: 2021-02-22 | End: 2021-02-22

## 2021-02-22 RX ORDER — 0.9 % SODIUM CHLORIDE 0.9 %
80 INTRAVENOUS SOLUTION INTRAVENOUS ONCE
Status: COMPLETED | OUTPATIENT
Start: 2021-02-22 | End: 2021-02-22

## 2021-02-22 RX ORDER — TRAZODONE HYDROCHLORIDE 100 MG/1
100 TABLET ORAL NIGHTLY
Status: DISCONTINUED | OUTPATIENT
Start: 2021-02-22 | End: 2021-02-26 | Stop reason: HOSPADM

## 2021-02-22 RX ORDER — NICOTINE 21 MG/24HR
1 PATCH, TRANSDERMAL 24 HOURS TRANSDERMAL DAILY PRN
Status: DISCONTINUED | OUTPATIENT
Start: 2021-02-22 | End: 2021-02-26 | Stop reason: HOSPADM

## 2021-02-22 RX ORDER — HEPARIN SODIUM 1000 [USP'U]/ML
60 INJECTION, SOLUTION INTRAVENOUS; SUBCUTANEOUS PRN
Status: DISCONTINUED | OUTPATIENT
Start: 2021-02-22 | End: 2021-02-25 | Stop reason: ALTCHOICE

## 2021-02-22 RX ORDER — ONDANSETRON 2 MG/ML
4 INJECTION INTRAMUSCULAR; INTRAVENOUS EVERY 6 HOURS PRN
Status: DISCONTINUED | OUTPATIENT
Start: 2021-02-22 | End: 2021-02-26 | Stop reason: HOSPADM

## 2021-02-22 RX ORDER — TRAZODONE HYDROCHLORIDE 100 MG/1
100 TABLET ORAL NIGHTLY
COMMUNITY

## 2021-02-22 RX ORDER — SODIUM CHLORIDE 0.9 % (FLUSH) 0.9 %
10 SYRINGE (ML) INJECTION EVERY 12 HOURS SCHEDULED
Status: DISCONTINUED | OUTPATIENT
Start: 2021-02-22 | End: 2021-02-22 | Stop reason: SDUPTHER

## 2021-02-22 RX ORDER — ONDANSETRON 2 MG/ML
4 INJECTION INTRAMUSCULAR; INTRAVENOUS EVERY 8 HOURS PRN
Status: DISCONTINUED | OUTPATIENT
Start: 2021-02-22 | End: 2021-02-22 | Stop reason: DRUGHIGH

## 2021-02-22 RX ORDER — HEPARIN SODIUM 1000 [USP'U]/ML
30 INJECTION, SOLUTION INTRAVENOUS; SUBCUTANEOUS PRN
Status: DISCONTINUED | OUTPATIENT
Start: 2021-02-22 | End: 2021-02-25 | Stop reason: ALTCHOICE

## 2021-02-22 RX ORDER — ACETAMINOPHEN 650 MG/1
650 SUPPOSITORY RECTAL EVERY 6 HOURS PRN
Status: DISCONTINUED | OUTPATIENT
Start: 2021-02-22 | End: 2021-02-26 | Stop reason: HOSPADM

## 2021-02-22 RX ORDER — ACETAMINOPHEN 325 MG/1
650 TABLET ORAL EVERY 4 HOURS PRN
Status: DISCONTINUED | OUTPATIENT
Start: 2021-02-22 | End: 2021-02-22 | Stop reason: DRUGHIGH

## 2021-02-22 RX ORDER — PANTOPRAZOLE SODIUM 20 MG/1
20 TABLET, DELAYED RELEASE ORAL
Status: DISCONTINUED | OUTPATIENT
Start: 2021-02-23 | End: 2021-02-26 | Stop reason: HOSPADM

## 2021-02-22 RX ORDER — DOXYCYCLINE HYCLATE 100 MG
100 TABLET ORAL EVERY 12 HOURS SCHEDULED
Status: DISCONTINUED | OUTPATIENT
Start: 2021-02-23 | End: 2021-02-23 | Stop reason: CLARIF

## 2021-02-22 RX ORDER — POLYETHYLENE GLYCOL 3350 17 G/17G
17 POWDER, FOR SOLUTION ORAL DAILY PRN
Status: DISCONTINUED | OUTPATIENT
Start: 2021-02-22 | End: 2021-02-26 | Stop reason: HOSPADM

## 2021-02-22 RX ORDER — SODIUM CHLORIDE 9 MG/ML
INJECTION, SOLUTION INTRAVENOUS CONTINUOUS
Status: DISCONTINUED | OUTPATIENT
Start: 2021-02-22 | End: 2021-02-25

## 2021-02-22 RX ORDER — ARIPIPRAZOLE 5 MG/1
5 TABLET ORAL DAILY
Status: DISCONTINUED | OUTPATIENT
Start: 2021-02-22 | End: 2021-02-26 | Stop reason: HOSPADM

## 2021-02-22 RX ORDER — DONEPEZIL HYDROCHLORIDE 10 MG/1
5 TABLET, FILM COATED ORAL NIGHTLY
Status: DISCONTINUED | OUTPATIENT
Start: 2021-02-22 | End: 2021-02-26 | Stop reason: HOSPADM

## 2021-02-22 RX ORDER — SODIUM CHLORIDE 0.9 % (FLUSH) 0.9 %
10 SYRINGE (ML) INJECTION PRN
Status: DISCONTINUED | OUTPATIENT
Start: 2021-02-22 | End: 2021-02-23 | Stop reason: SDUPTHER

## 2021-02-22 RX ADMIN — Medication 10 ML: at 10:06

## 2021-02-22 RX ADMIN — ACETAMINOPHEN 650 MG: 325 TABLET ORAL at 08:49

## 2021-02-22 RX ADMIN — HEPARIN SODIUM 3950 UNITS: 1000 INJECTION, SOLUTION INTRAVENOUS; SUBCUTANEOUS at 11:18

## 2021-02-22 RX ADMIN — HEPARIN SODIUM AND DEXTROSE 12 UNITS/KG/HR: 10000; 5 INJECTION INTRAVENOUS at 11:18

## 2021-02-22 RX ADMIN — CEFTRIAXONE SODIUM 1000 MG: 1 INJECTION, POWDER, FOR SOLUTION INTRAMUSCULAR; INTRAVENOUS at 15:05

## 2021-02-22 RX ADMIN — DONEPEZIL HYDROCHLORIDE 5 MG: 10 TABLET, FILM COATED ORAL at 20:51

## 2021-02-22 RX ADMIN — SODIUM CHLORIDE 100 ML/HR: 9 INJECTION, SOLUTION INTRAVENOUS at 09:23

## 2021-02-22 RX ADMIN — SODIUM CHLORIDE 80 ML: 9 INJECTION, SOLUTION INTRAVENOUS at 10:06

## 2021-02-22 RX ADMIN — Medication 2 MCG/MIN: at 12:50

## 2021-02-22 RX ADMIN — TRAZODONE HYDROCHLORIDE 100 MG: 100 TABLET ORAL at 20:51

## 2021-02-22 RX ADMIN — IOPAMIDOL 100 ML: 755 INJECTION, SOLUTION INTRAVENOUS at 10:06

## 2021-02-22 RX ADMIN — Medication 10 ML: at 20:54

## 2021-02-22 ASSESSMENT — ENCOUNTER SYMPTOMS
ABDOMINAL PAIN: 1
TROUBLE SWALLOWING: 0
VOMITING: 1
SHORTNESS OF BREATH: 0

## 2021-02-22 ASSESSMENT — PAIN SCALES - GENERAL
PAINLEVEL_OUTOF10: 0
PAINLEVEL_OUTOF10: 2
PAINLEVEL_OUTOF10: 0
PAINLEVEL_OUTOF10: 0

## 2021-02-22 NOTE — PROGRESS NOTES
Transitions of Care Pharmacy Service   Medication Review    The patient's list of current home medications has been reviewed and updated. Source(s) of information: Patient/ Surescripts    Please feel free to call with any questions about this encounter. Thank you. Casi Wise, [facility]  Transitions of Care Pharmacy Service  Phone:  [phone]  Fax: [phone]            Prior to Admission medications    Medication Sig Start Date End Date Taking?  Authorizing Provider   traZODone (DESYREL) 100 MG tablet Take 100 mg by mouth nightly   Yes Historical Provider, MD   magnesium hydroxide (MILK OF MAGNESIA) 400 MG/5ML suspension Take 15 mLs by mouth every other day   Yes Historical Provider, MD   citalopram (CELEXA) 20 MG tablet Take 20 mg by mouth daily    Historical Provider, MD   ARIPiprazole (ABILIFY) 5 MG tablet Take 5 mg by mouth daily    Historical Provider, MD   donepezil (ARICEPT) 5 MG tablet Take 5 mg by mouth nightly  1/30/18   Historical Provider, MD   omeprazole (PRILOSEC) 10 MG delayed release capsule Take 20 mg by mouth every morning (before breakfast)  5/1/18 6/30/18  Historical Provider, MD           cyanocobalamin 1000 MCG/ML injection Inject 1,000 mcg into the muscle every 30 days    Historical Provider, MD   Multiple Vitamins-Minerals (CENTRUM SILVER ULTRA WOMENS) TABS Take 1 tablet by mouth daily    Historical Provider, MD   Cholecalciferol (VITAMIN D-3 PO)   Take 2,000 Units by mouth daily     Historical Provider, MD

## 2021-02-22 NOTE — ED PROVIDER NOTES
EMERGENCY DEPARTMENT ENCOUNTER    Pt Name: Glendy Monroy  MRN: [id number]  [place] 10/28/1932  Date of evaluation: 2/22/21  CHIEF COMPLAINT       Chief Complaint   Patient presents with    Back Pain     HISTORY OF PRESENT ILLNESS   Patient is an 57-year-old female brought in by EMS after she called for left hip pain. She states it started yesterday evening after she sat down in a chair. She walks with a walker normally. She lives alone. Denies actually falling. Denies any back pain. Denies any bowel or bladder dysfunction. Denies fevers cough chest pain shortness of breath. She states she did have \"upset stomach\" yesterday evening and threw up once. Denies any hemoptysis hematemesis. EMS did state that there EKG was concerning for STEMI in the anterior leads but there are no reciprocal changes and patient denied any chest pain. She was given full dose aspirin. REVIEW OF SYSTEMS     Review of Systems   Constitutional: Negative for chills and fever. HENT: Negative for trouble swallowing. Eyes: Negative for visual disturbance. Respiratory: Negative for shortness of breath. Cardiovascular: Negative for chest pain. Gastrointestinal: Positive for abdominal pain and vomiting. Genitourinary: Negative for difficulty urinating. Musculoskeletal: Positive for arthralgias (left hip). Negative for neck pain. Skin: Negative for rash. Neurological: Negative for weakness. Psychiatric/Behavioral: Negative for confusion.      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 Edna Solano)    Bradycardia     Cataracts, bilateral     Colon polyp 1997    Diverticulosis     Fibroid     Forgetfulness 6/27/13    (observed by neighbor who came with pt at a visit prior to today)    Gait disturbance     Gall stones     Headache(784.0)     Hearing loss     HTN (hypertension)     Insomnia     Lichen sclerosus     Osteoporosis     Other activity(E029.9)     introital fissure    Pap smear for cervical cancer screening     no further paps    Pneumonia 2006    Vaginal atrophy     Vaginal discharge     enteric jose r. fistulogram negative     SURGICAL HISTORY       Past Surgical History:   Procedure Laterality Date    BLEPHAROPLASTY      BREAST BIOPSY      CHOLECYSTECTOMY      HYSTERECTOMY      TAVO due to fibroids    JOINT REPLACEMENT      KIDNEY CYST REMOVAL      OTHER SURGICAL HISTORY      thyroid nodule removed    VULVAR/PERINEAL BIOPSY       CURRENT MEDICATIONS       Current Discharge Medication List      CONTINUE these medications which have NOT CHANGED    Details   citalopram (CELEXA) 20 MG tablet Take 20 mg by mouth daily      ARIPiprazole (ABILIFY) 5 MG tablet Take 5 mg by mouth daily      donepezil (ARICEPT) 5 MG tablet Take 5 mg by mouth nightly       omeprazole (PRILOSEC) 10 MG delayed release capsule Take 20 mg by mouth      LORazepam (ATIVAN) 0.5 MG tablet Take 0.5 mg by mouth every 6 hours as needed for Anxiety. .      cyanocobalamin 1000 MCG/ML injection Inject 1,000 mcg into the muscle every 30 days      Multiple Vitamins-Minerals (CENTRUM SILVER ULTRA WOMENS) TABS Take 1 tablet by mouth daily      Cholecalciferol (VITAMIN D-3 PO)   Take 2,000 Units by mouth daily       Polyethylene Glycol 3350 (MIRALAX PO) Take  by mouth as needed. ALLERGIES     has No Known Allergies. FAMILY HISTORY     She indicated that the status of her mother is unknown. She indicated that the status of her father is unknown. She indicated that her brother is . SOCIAL HISTORY       Social History     Tobacco Use    Smoking status: Former Smoker     Quit date: 10/16/1973     Years since quittin.3    Smokeless tobacco: Never Used    Tobacco comment: stopped 40 years ago   Substance Use Topics    Alcohol use:  Yes     Alcohol/week: 0.0 standard drinks    Drug use: No     PHYSICAL see ED Course below for MDM/ED course. DDx: Fracture, dislocation, intra-abdominal pathology, ACS, septic joint    All patient's question's and concerns were answered prior to disposition and patient and/or family expressed understanding and agreement of treatment plan. CRITICAL CARE:   CRITICAL CARE: There was a high probability of clinically significant/life threatening deterioration in this patient's condition which required my urgent intervention. Total critical care time was 32 minutes. This excludes any time for separately reportable procedures. NIH STROKE SCALE:            PROCEDURES:    Procedures    DIAGNOSTIC RESULTS   EKG:All EKG's are interpreted by the Emergency Department Physician who either signs or Co-signs this chart in the absence of a cardiologist.    Normal sinus rhythm rate of 86    normal axis there is ST elevation in the septal anterior leads V1 to V3    RADIOLOGY:All plain film, CT, MRI, and formal ultrasound images (except ED bedside ultrasound) are read by the radiologist, see reports below, unless otherwisenoted in MDM or here. IR FLUORO GUIDED CVA DEVICE PLMT/REPLACE/REMOVAL   Final Result   Successful ultrasound and fluoroscopy guided non-tunneled triple-lumen   catheter placement. CTA CHEST ABDOMEN PELVIS W CONTRAST   Final Result   1. No acute aortic abnormality, aneurysm or dissection. 2.  No evidence for acute pulmonary embolism. 3.  Interstitial infiltrate in the right middle lobe and right lower lobe. Small pleural effusions and dependent atelectasis are also present. This may   represent multifocal inflammatory process or infection. 4.  No acute abnormality identified in the abdomen or pelvis. 5.  Status post cholecystectomy. Biliary enlargement is noted, which may   represent post cholecystectomy reservoir effect.   If biliary laboratory   evaluation is abnormal, further evaluation with MRCP or ERCP may be warranted. 6.  No acute osseous abnormality identified in the pelvis or left hip. 7.  Moderate size hiatal hernia. XR HIP 2-3 VW W PELVIS LEFT   Final Result   No evidence of acute bony abnormality involving the pelvis or hips, with   attention to the left hip. LABS: All lab results were reviewed by myself, and all abnormals are listed below.   Labs Reviewed   TROP/MYOGLOBIN - Abnormal; Notable for the following components:       Result Value    Troponin, High Sensitivity 585 (*)     Myoglobin 192 (*)     All other components within normal limits   CBC WITH AUTO DIFFERENTIAL - Abnormal; Notable for the following components:    RBC 3.73 (*)     Hemoglobin 11.3 (*)     Hematocrit 35.3 (*)     Platelets 187 (*)     Seg Neutrophils 89 (*)     Lymphocytes 3 (*)     Eosinophils % 0 (*)     Immature Granulocytes 1 (*)     Segs Absolute 9.70 (*)     Absolute Lymph # 0.33 (*)     All other components within normal limits   BASIC METABOLIC PANEL - Abnormal; Notable for the following components:    Glucose 174 (*)     BUN 25 (*)     CREATININE 0.93 (*)     Bun/Cre Ratio 27 (*)     GFR Non- 57 (*)     All other components within normal limits   TROP/MYOGLOBIN - Abnormal; Notable for the following components:    Troponin, High Sensitivity 698 (*)     Myoglobin 229 (*)     All other components within normal limits   URINE RT REFLEX TO CULTURE - Abnormal; Notable for the following components:    Turbidity UA CLOUDY (*)     Ketones, Urine TRACE (*)     Urine Hgb TRACE (*)     Protein, UA TRACE (*)     Nitrite, Urine POSITIVE (*)     Leukocyte Esterase, Urine SMALL (*)     All other components within normal limits   MICROSCOPIC URINALYSIS - Abnormal; Notable for the following components:    Bacteria, UA MANY (*)     Amorphous, UA 1+ (*)     All other components within normal limits   COVID-19, RAPID   MAGNESIUM   APTT   LACTIC ACID   APTT   APTT       EMERGENCY DEPARTMENTCOURSE:     Patient is an 70-year-old female here with left hip pain. She states it started after she sat down yesterday. She had a bit of an upset stomach as well an episode of vomiting. Here she is in no distress she is afebrile. EMS did do a precautionary EKG given her age and other EKGs were concerning for STEMI with anterior ST elevation no reciprocal changes. EKG here does show ST elevation in V1 about 1 mm in V2 1.5 mm as well as about half millimeter in V3, no reciprocal changes, she is having no active chest pain or complaints although she did have some upset stomach and vomiting last night. Will check troponin, will speak with cardiology, will x-ray left hip and check labs and reassess. 9:07 AM EST  Patient's troponin is 585. Given her EKG findings with ST elevations in the septal anterior leads will page STEMI alert. Spoke with Dr. Billy Stroud at 908 am.     1. Time of EK:57  2. Time EKG seen by attending physician: 98.37.96. Time STEMI Alert called: 908  4. Time Cardiologist paged: [phone]  7. Time Cardiologist return call: 908  6. Name of [address] [place] [address] Drive,[facility] [address]  7. Any changes to STEMI Alert by cardiologist: not a stemi    Cardiology recommends repeat her EKG and would like the old EKG sent to him. 9:25 AM EST  Dr. Billy Stroud states no STEMI at this time, recommends heparin if no contraindications, recommends Dr. Soy Mccoy to evaluate the patient. Dr. Soy Mccoy recommends heparin as well as nitro drip if tolerated. I am going to CTA C/A/P to eval aorta prior to heparinizing. CTA chest abdomen pelvis is negative for acute pathology. Her lactic acid is normal no leukocytosis. There is no evidence for infection in the chest abdomen or pelvis. No evidence of meningitis. She is however persistently hypotensive. We will get a cath urine. She does not meet SIRS criteria. Will start Levophed peripherally until central line is placed. 1:45 PM EST  CVC in place. Dr. Billy Stroud states patient has actually seen Dr. Link Lefort prior. IMPRESSION      1. NSTEMI (non-ST elevated myocardial infarction) ([facility] [address]          DISPOSITION/PLAN   DISPOSITION  Decision to admit      PATIENT REFERRED TO:  No follow-up provider specified. DISCHARGE MEDICATIONS:  Current Discharge Medication List        Erwin Goldberg MD  Attending Emergency Physician    This note was created with the assistance of a speech-recognition program. While intending to generate a document that actually reflects the content of the visit, no guarantees can be provided that every mistake has been identified and corrected by editing.                    Erwin Goldberg MD  02/22/21 9077

## 2021-02-22 NOTE — PROGRESS NOTES
Noted prior to arrival :    Results for Jose Torres (MRN [id number]) as of 2/22/2021 15:33   Ref. Range 2/22/2021 11:30   SARS-CoV-2, Rapid Latest Ref Range: Not Detected  Not Detected     And patient currently returning from IR - R IJ line placed at this time as patient needing Pressor support.

## 2021-02-22 NOTE — ED NOTES
Pt called EMS for left sided hip and back pain. EMS took an EKG which was reading STEMI. Pt denies cp or shortness of breath but states she had one episode of emesis last night. NSR on monitor. She is hypotensive. She denies any falls or injuries.        Yusuf Klein RN  02/22/21 0900

## 2021-02-22 NOTE — H&P
History & Physical  [facility]    Adult Hospitalist      Name: Temo Henderson  MRN: [id number]     Acct: [de-identified]  Room: 2030/2030-01    Admit Date: 2/22/2021  7:58 AM  PCP: Robby Mejía MD    Primary Problem  Active Problems:    * No active hospital problems. *  Resolved Problems:    * No resolved hospital problems. *        Assesment:     · NSTEMI  · Chronic systolic CHF/ CHFrEF  · Hypotension   · Multi focal pneumonia RML, RLL  · Acute cystitis without hematuria   · Essential hypertension   · Osteoporosis   · Anxiety disorder  · Memory impairment   · Major depressive disorder   · GERD without esophagitis         Plan:     · Admit CCU  · Monitor vitals closely  · Keep SpO2 above 90%  · Is/ Os  · IV fluids  · IV Heparin gtt  · Cardiology consulted in ER  · Rocephin  · Doxycycline  · RT eval  · Duo Neb prn  · Resume essential meds  · Check CBC, BMP  · Serial cardiac enzymes  · EKG  · DVT and GI prophylaxis. Chief Complaint:     Chief Complaint   Patient presents with    Back Pain         History of Present Illness:      Temo Henderson is a 80 y.o.  female who presents with Back Pain    Patient brought to the ED by EMS after she had called 911 for c/o pain in left hip. Pt says she could not get out of bed because of that pain. She states the pain had been ongoing since the day before when she sat in a chair. Pt says she did have a loose BM and had vomited yesterday once. Pt says she has been feeling very tired and got exhausted after minimal activity. Pt says sh e thought it was her hip but when EMTs came over, they told her it was her heart. Per EMS a 12 lead EKG was done which read 'STEMI' in anterior leads but there were no reciprocal changes. The pt had denied any chest pain, dyspnea, cough, wheezing, headache, vision change, nausea, vomiting, abdominal pain, joint swelling or rash. Cardiology reviewed EKG in the ER and determined the pt did not have STEMI.  The pt was started on Heparin gtt and plan would be to get a Cardiac cath done tomorrow. Pt was also found to have a UTI. CT chest showed an infiltrates. Left hip eval was unremarkable. I have personally reviewed the past medical history, past surgical history, medications, social history, and family history, and summarized in the note. Review of Systems:     All 10 point system is reviewed and negative otherwise mentioned in HPI. 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 fistulogram negative        Past Surgical History:     Past Surgical History:   Procedure Laterality Date    BLEPHAROPLASTY      BREAST BIOPSY      CHOLECYSTECTOMY      HYSTERECTOMY      TAVO due to fibroids    JOINT REPLACEMENT  2011    KIDNEY CYST REMOVAL      OTHER SURGICAL HISTORY      thyroid nodule removed    VULVAR/PERINEAL BIOPSY          Medications Prior to Admission:       Prior to Admission medications    Medication Sig Start Date End Date Taking?  Authorizing Provider   traZODone (DESYREL) 100 MG tablet Take 100 mg by mouth nightly   Yes Historical Provider, MD   magnesium hydroxide (MILK OF MAGNESIA) 400 MG/5ML suspension Take 15 mLs by mouth every other day   Yes Historical Provider, MD   citalopram (CELEXA) 20 MG tablet Take 20 mg by mouth daily    Historical Provider, MD   ARIPiprazole (ABILIFY) 5 MG tablet Take 5 mg by mouth daily    Historical Provider, MD   donepezil (ARICEPT) 5 MG tablet Take 5 mg by mouth nightly  18   Historical Provider, MD   omeprazole (PRILOSEC) 10 MG delayed release capsule Take 20 mg by mouth every morning (before breakfast)  18  Historical Provider, MD   cyanocobalamin 1000 MCG/ML injection Inject 1,000 mcg into the muscle every 30 days    Historical Provider, MD   Multiple Vitamins-Minerals (CENTRUM SILVER ULTRA WOMENS) TABS Take 1 tablet by mouth daily    Historical Provider, MD   Cholecalciferol (VITAMIN D-3 PO)   Take 2,000 Units by mouth daily     Historical Provider, MD        Allergies:       Patient has no known allergies. Social History:     Tobacco:    reports that she quit smoking about 47 years ago. She has never used smokeless tobacco.  Alcohol:      reports current alcohol use. Drug Use:  reports no history of drug use.     Family History:     Family History   Problem Relation Age of Onset    Colon Cancer Brother             Diabetes Father     Bipolar Disorder Mother     Osteoporosis Mother     Migraines Mother          Physical Exam:     Vitals:  BP (!) 88/52   Pulse 69   Temp 98 °F (36.7 °C) (Temporal)   Resp 19   Ht 5' 9\" (1.753 m)   Wt 145 lb (65.8 kg)   SpO2 98%   BMI 21.41 kg/m²   Temp (24hrs), Av.3 °F (36.8 °C), Min:98 °F (36.7 °C), Max:98.8 °F (37.1 °C)          General appearance - alert, well appearing, and in no acute distress  Mental status - oriented to person, place, and time with normal affect  Head - normocephalic and atraumatic  Eyes - pupils equal and reactive, extraocular eye movements intact, conjunctiva clear  Ears - hearing appears to be intact  Nose - no drainage noted  Mouth - mucous membranes moist  Neck - supple, no carotid bruits, thyroid not palpable  Chest - clear to auscultation, normal effort  Heart - normal rate, regular rhythm, no murmur  Abdomen - soft, nontender, nondistended, abnormality, aneurysm or dissection. 2.  No evidence for acute pulmonary embolism. 3.  Interstitial infiltrate in the right middle lobe and right lower lobe. Small pleural effusions and dependent atelectasis are also present. This may represent multifocal inflammatory process or infection. 4.  No acute abnormality identified in the abdomen or pelvis. 5.  Status post cholecystectomy. Biliary enlargement is noted, which may represent post cholecystectomy reservoir effect. If biliary laboratory evaluation is abnormal, further evaluation with MRCP or ERCP may be warranted. 6.  No acute osseous abnormality identified in the pelvis or left hip. 7.  Moderate size hiatal hernia. Xr Hip 2-3 Vw W Pelvis Left    Result Date: 2/22/2021  No evidence of acute bony abnormality involving the pelvis or hips, with attention to the left hip. All radiological studies reviewed                Code Status:  Full Code    Electronically signed by Modesta Cody MD on 2/22/2021 at 6:45 PM     Copy sent to Dr. Vaibhav Lopez MD    This note was created with the assistance of a speech-recognition program.  Although the intention is to generate a document that actually reflects the content of the visit, no guarantees can be provided that every mistake has been identified and corrected by editing. Note was updated later by me after  physical examination and  completion of the assessment.

## 2021-02-22 NOTE — PROGRESS NOTES
Spoke with Jonny Fisher, RN in ER. Dr. Iker Rod reviewed EKG - not a STEMI. NO plans to cath today. Dr. Iker Rod saw patient in ER but stated they were signing off as patient has seen Dr. José Miguel lopez in the past.     Dr. Bertin Hull consulted at this time.

## 2021-02-22 NOTE — PROGRESS NOTES
ADMISSION TO CVICU FROM ER:    Patient transported to room 2030 via Demond Duvall ED RN on stretcher. Patient placed in bed and put on cardiac monitor. Vital signs obtained. Patient in SB rhythm. Patient updated on plan of care and verbalized understanding. All lines and tubes in tact. Rocephin IVPB, NS @ 100, Levo @ 3 mcg/min and Heparin at 12 u/kg/hr infusing. Patient answering questions appropriately. Patient states she is incontinent of urine, patient left to air out with disposable chux after being cleaned as gluteal fold and sacrum are bright red/excoriated. Mepilex placed and gluteal folds bathed with cleansing foam and barrier cream to protect skin. Fresh gown applied and warm blanket. Will continue to monitor.

## 2021-02-23 ENCOUNTER — APPOINTMENT (OUTPATIENT)
Dept: ULTRASOUND IMAGING | Age: 86
DRG: 280 | End: 2021-02-23
Payer: MEDICARE

## 2021-02-23 LAB
ANION GAP SERPL CALCULATED.3IONS-SCNC: 7 MMOL/L (ref 9–17)
ANTI-XA UNFRAC HEPARIN: 0.43 IU/L (ref 0.3–0.7)
BUN BLDV-MCNC: 21 MG/DL (ref 8–23)
BUN/CREAT BLD: 30 (ref 9–20)
CALCIUM SERPL-MCNC: 7.9 MG/DL (ref 8.6–10.4)
CHLORIDE BLD-SCNC: 101 MMOL/L (ref 98–107)
CO2: 29 MMOL/L (ref 20–31)
CREAT SERPL-MCNC: 0.7 MG/DL (ref 0.5–0.9)
EKG ATRIAL RATE: 77 BPM
EKG ATRIAL RATE: 86 BPM
EKG P AXIS: 56 DEGREES
EKG P AXIS: 64 DEGREES
EKG P-R INTERVAL: 132 MS
EKG P-R INTERVAL: 134 MS
EKG Q-T INTERVAL: 442 MS
EKG Q-T INTERVAL: 458 MS
EKG QRS DURATION: 108 MS
EKG QRS DURATION: 112 MS
EKG QTC CALCULATION (BAZETT): 500 MS
EKG QTC CALCULATION (BAZETT): 548 MS
EKG R AXIS: -12 DEGREES
EKG R AXIS: -7 DEGREES
EKG T AXIS: 67 DEGREES
EKG T AXIS: 69 DEGREES
EKG VENTRICULAR RATE: 77 BPM
EKG VENTRICULAR RATE: 86 BPM
GFR AFRICAN AMERICAN: >60 ML/MIN
GFR NON-AFRICAN AMERICAN: >60 ML/MIN
GFR SERPL CREATININE-BSD FRML MDRD: ABNORMAL ML/MIN/{1.73_M2}
GFR SERPL CREATININE-BSD FRML MDRD: ABNORMAL ML/MIN/{1.73_M2}
GLUCOSE BLD-MCNC: 115 MG/DL (ref 70–99)
HCT VFR BLD CALC: 33.6 % (ref 36.3–47.1)
HEMOGLOBIN: 10.7 G/DL (ref 11.9–15.1)
INR BLD: 1.2
MCH RBC QN AUTO: 31.1 PG (ref 25.2–33.5)
MCHC RBC AUTO-ENTMCNC: 31.8 G/DL (ref 28.4–34.8)
MCV RBC AUTO: 97.7 FL (ref 82.6–102.9)
MYOGLOBIN: 56 NG/ML (ref 25–58)
NRBC AUTOMATED: 0 PER 100 WBC
PDW BLD-RTO: 14.3 % (ref 11.8–14.4)
PLATELET # BLD: 128 K/UL (ref 138–453)
PMV BLD AUTO: 11 FL (ref 8.1–13.5)
POTASSIUM SERPL-SCNC: 3.9 MMOL/L (ref 3.7–5.3)
PROTHROMBIN TIME: 14.8 SEC (ref 11.5–14.2)
RBC # BLD: 3.44 M/UL (ref 3.95–5.11)
SODIUM BLD-SCNC: 137 MMOL/L (ref 135–144)
TROPONIN INTERP: ABNORMAL
TROPONIN T: ABNORMAL NG/ML
TROPONIN, HIGH SENSITIVITY: 307 NG/L (ref 0–14)
WBC # BLD: 9.9 K/UL (ref 3.5–11.3)

## 2021-02-23 PROCEDURE — 85027 COMPLETE CBC AUTOMATED: CPT

## 2021-02-23 PROCEDURE — C1725 CATH, TRANSLUMIN NON-LASER: HCPCS

## 2021-02-23 PROCEDURE — 85520 HEPARIN ASSAY: CPT

## 2021-02-23 PROCEDURE — C1894 INTRO/SHEATH, NON-LASER: HCPCS

## 2021-02-23 PROCEDURE — 83874 ASSAY OF MYOGLOBIN: CPT

## 2021-02-23 PROCEDURE — 84484 ASSAY OF TROPONIN QUANT: CPT

## 2021-02-23 PROCEDURE — 76770 US EXAM ABDO BACK WALL COMP: CPT

## 2021-02-23 PROCEDURE — 93454 CORONARY ARTERY ANGIO S&I: CPT | Performed by: INTERNAL MEDICINE

## 2021-02-23 PROCEDURE — 6370000000 HC RX 637 (ALT 250 FOR IP): Performed by: FAMILY MEDICINE

## 2021-02-23 PROCEDURE — 2580000003 HC RX 258: Performed by: INTERNAL MEDICINE

## 2021-02-23 PROCEDURE — 6360000002 HC RX W HCPCS

## 2021-02-23 PROCEDURE — 6360000002 HC RX W HCPCS: Performed by: FAMILY MEDICINE

## 2021-02-23 PROCEDURE — 6360000004 HC RX CONTRAST MEDICATION

## 2021-02-23 PROCEDURE — 80048 BASIC METABOLIC PNL TOTAL CA: CPT

## 2021-02-23 PROCEDURE — 4A023N7 MEASUREMENT OF CARDIAC SAMPLING AND PRESSURE, LEFT HEART, PERCUTANEOUS APPROACH: ICD-10-PCS | Performed by: INTERNAL MEDICINE

## 2021-02-23 PROCEDURE — 85610 PROTHROMBIN TIME: CPT

## 2021-02-23 PROCEDURE — 2000000000 HC ICU R&B

## 2021-02-23 PROCEDURE — 93010 ELECTROCARDIOGRAM REPORT: CPT | Performed by: INTERNAL MEDICINE

## 2021-02-23 PROCEDURE — 2709999900 HC NON-CHARGEABLE SUPPLY

## 2021-02-23 PROCEDURE — 2580000003 HC RX 258: Performed by: FAMILY MEDICINE

## 2021-02-23 PROCEDURE — B2111ZZ FLUOROSCOPY OF MULTIPLE CORONARY ARTERIES USING LOW OSMOLAR CONTRAST: ICD-10-PCS | Performed by: INTERNAL MEDICINE

## 2021-02-23 PROCEDURE — 2500000003 HC RX 250 WO HCPCS

## 2021-02-23 RX ORDER — SODIUM CHLORIDE 0.9 % (FLUSH) 0.9 %
10 SYRINGE (ML) INJECTION PRN
Status: DISCONTINUED | OUTPATIENT
Start: 2021-02-23 | End: 2021-02-26 | Stop reason: HOSPADM

## 2021-02-23 RX ORDER — SODIUM CHLORIDE 9 MG/ML
INJECTION, SOLUTION INTRAVENOUS CONTINUOUS
Status: DISCONTINUED | OUTPATIENT
Start: 2021-02-23 | End: 2021-02-24

## 2021-02-23 RX ORDER — ACETAMINOPHEN 325 MG/1
650 TABLET ORAL EVERY 4 HOURS PRN
Status: DISCONTINUED | OUTPATIENT
Start: 2021-02-23 | End: 2021-02-26 | Stop reason: HOSPADM

## 2021-02-23 RX ORDER — SODIUM CHLORIDE 0.9 % (FLUSH) 0.9 %
10 SYRINGE (ML) INJECTION EVERY 12 HOURS SCHEDULED
Status: DISCONTINUED | OUTPATIENT
Start: 2021-02-23 | End: 2021-02-26 | Stop reason: HOSPADM

## 2021-02-23 RX ORDER — DOXYCYCLINE 100 MG/1
100 CAPSULE ORAL EVERY 12 HOURS SCHEDULED
Status: DISCONTINUED | OUTPATIENT
Start: 2021-02-23 | End: 2021-02-26 | Stop reason: HOSPADM

## 2021-02-23 RX ADMIN — DONEPEZIL HYDROCHLORIDE 5 MG: 10 TABLET, FILM COATED ORAL at 20:56

## 2021-02-23 RX ADMIN — POLYETHYLENE GLYCOL 3350 17 G: 17 POWDER, FOR SOLUTION ORAL at 13:33

## 2021-02-23 RX ADMIN — SODIUM CHLORIDE, PRESERVATIVE FREE 10 ML: 5 INJECTION INTRAVENOUS at 20:56

## 2021-02-23 RX ADMIN — CEFTRIAXONE SODIUM 1000 MG: 1 INJECTION, POWDER, FOR SOLUTION INTRAMUSCULAR; INTRAVENOUS at 13:33

## 2021-02-23 RX ADMIN — SODIUM CHLORIDE, PRESERVATIVE FREE 10 ML: 5 INJECTION INTRAVENOUS at 12:53

## 2021-02-23 RX ADMIN — ARIPIPRAZOLE 5 MG: 5 TABLET ORAL at 12:52

## 2021-02-23 RX ADMIN — CITALOPRAM HYDROBROMIDE 20 MG: 20 TABLET ORAL at 12:52

## 2021-02-23 RX ADMIN — DOXYCYCLINE 100 MG: 100 CAPSULE ORAL at 20:56

## 2021-02-23 RX ADMIN — DOXYCYCLINE 100 MG: 100 CAPSULE ORAL at 12:51

## 2021-02-23 ASSESSMENT — PAIN SCALES - GENERAL
PAINLEVEL_OUTOF10: 0

## 2021-02-23 NOTE — PROCEDURES
The patient was referred for coronary angiography and possible PCI, benefits, risks and alternatives explained and agree to proceed. Mild CAD. Recommendations: Medical therapy and risk factor modifications.

## 2021-02-23 NOTE — CONSULTS
[facility]    Consult                        Today's Date: 2/23/2021  Patient Name: Aakash Solis  Date of admission: 2/22/2021  7:58 AM  Patient's age: 80 y. o., 10/28/1932  Admission Dx: NSTEMI (non-ST elevated myocardial infarction) ([facility] [I21.4]    Reason for Consult:  NSTEMI    Requesting Physician: Stephen Bang MD    CHIEF COMPLAINT:  NSTEMI    History Obtained From:  patient    HISTORY OF PRESENT ILLNESS:      The patient is a 80 y.o.  female who is admitted to the hospital for NSTEMI. The patient presented with fatigue, SOB and tired. No chest pain, no dizziness or syncope. Also had left hip pain. Past Medical History:   has a past medical history of Anemia, Anxiety, Naranjo disease, Bradycardia, Cataracts, bilateral, Colon polyp, Diverticulosis, Fibroid, Forgetfulness, Gait disturbance, Gall stones, Headache(784.0), Hearing loss, HTN (hypertension), Insomnia, Lichen sclerosus, Osteoporosis, Other activity(E029.9), Pap smear for cervical cancer screening, Pneumonia, Vaginal atrophy, and Vaginal discharge. Past Surgical History:   has a past surgical history that includes vulvar/perineal biopsy; Cholecystectomy; blepharoplasty; other surgical history; Hysterectomy; Breast biopsy; joint replacement (2011); and Kidney cyst removal.     Home Medications:    Prior to Admission medications    Medication Sig Start Date End Date Taking?  Authorizing Provider   traZODone (DESYREL) 100 MG tablet Take 100 mg by mouth nightly   Yes Historical Provider, MD   magnesium hydroxide (MILK OF MAGNESIA) 400 MG/5ML suspension Take 15 mLs by mouth every other day   Yes Historical Provider, MD   citalopram (CELEXA) 20 MG tablet Take 20 mg by mouth daily    Historical Provider, MD   ARIPiprazole (ABILIFY) 5 MG tablet Take 5 mg by mouth daily    Historical Provider, MD   donepezil (ARICEPT) 5 MG tablet Take 5 mg by mouth nightly  1/30/18   Historical Provider, MD   omeprazole (PRILOSEC) 10 MG delayed release capsule Take 20 mg by mouth every morning (before breakfast)  5/1/18 6/30/18  Historical Provider, MD   cyanocobalamin 1000 MCG/ML injection Inject 1,000 mcg into the muscle every 30 days    Historical Provider, MD   Multiple Vitamins-Minerals (CENTRUM SILVER ULTRA WOMENS) TABS Take 1 tablet by mouth daily    Historical Provider, MD   Cholecalciferol (VITAMIN D-3 PO)   Take 2,000 Units by mouth daily     Historical Provider, MD       Allergies:  Patient has no known allergies. Social History:   reports that she quit smoking about 47 years ago. She has never used smokeless tobacco. She reports current alcohol use. She reports that she does not use drugs. Family History: family history includes Bipolar Disorder in her mother; Colon Cancer in her brother; Diabetes in her father; Migraines in her mother; Osteoporosis in her mother. No h/o sudden cardiac death. No for premature CAD    REVIEW OF SYSTEMS:    · Constitutional: there has been no unanticipated weight loss. There's been Yes change in energy level, Yes change in activity level. · Eyes: No visual changes or diplopia. No scleral icterus. · ENT: No Headaches, hearing loss or vertigo. No mouth sores or sore throat. · Cardiovascular: No chest pain, Yes dyspnea on exertion, No palpitations or No loss of consciousness. No cough, hemoptysis, No pleuritic pain, or phlebitis. · Respiratory: No cough or wheezing, no sputum production. No hematemesis. · Gastrointestinal: No abdominal pain, appetite loss, blood in stools. No change in bowel or bladder habits. · Genitourinary: No dysuria, trouble voiding, or hematuria. · Musculoskeletal:  No gait disturbance, No weakness or joint complaints. · Integumentary: No rash or pruritis. · Neurological: No headache, diplopia, change in muscle strength, numbness or tingling. No change in gait, balance, coordination, mood, affect, memory, mentation, behavior.   · Psychiatric: No anxiety, or depression. · Endocrine: No temperature intolerance. No excessive thirst, fluid intake, or urination. No tremor. · Hematologic/Lymphatic: No abnormal bruising or bleeding, blood clots or swollen lymph nodes. · Allergic/Immunologic: No nasal congestion or hives. PHYSICAL EXAM:      /64   Pulse 70   Temp 97.6 °F (36.4 °C) (Temporal)   Resp 16   Ht 5' 9\" (1.753 m)   Wt 142 lb (64.4 kg)   SpO2 97%   BMI 20.97 kg/m²    Constitutional and General Appearance: alert, cooperative, no distress and appears stated age  HEENT: PERRL, no cervical lymphadenopathy. No masses palpable. Normal oral mucosa  Respiratory:  · Normal excursion and expansion without use of accessory muscles  · Resp Auscultation: Good respiratory effort. No for increased work of breathing. On auscultation: clear to auscultation bilaterally  Cardiovascular:  · The apical impulse is not displaced  · Heart tones are crisp and normal. regular S1 and S2.  · Jugular venous pulsation Normal  · The carotid upstroke is normal in amplitude and contour without delay or bruit  · Peripheral pulses are symmetrical and full  Abdomen:  · No masses or tenderness  · Bowel sounds present  Extremities:  ·  No Cyanosis or Clubbing  ·  Lower extremity edema: No  · Skin: Warm and dry  Neurological:  · Alert and oriented. · Moves all extremities well  · No abnormalities of mood, affect, memory, mentation, or behavior are noted    DATA:    Diagnostics:    EKG: NSR. Nonspecific ST changes. Labs:     CBC:   Recent Labs     02/22/21  0834 02/23/21  0330   WBC 10.9 9.9   HGB 11.3* 10.7*   HCT 35.3* 33.6*   * 128*     BMP:   Recent Labs     02/22/21  0834 02/23/21  0330    137   K 4.0 3.9   CO2 29 29   BUN 25* 21   CREATININE 0.93* 0.70   LABGLOM 57* >60   GLUCOSE 174* 115*     BNP: No results for input(s): BNP in the last 72 hours.   PT/INR:   Recent Labs     02/23/21  0330   PROTIME 14.8*   INR 1.2     APTT:  Recent Labs     02/22/21 0834   APTT 29.6     CARDIAC ENZYMES:No results for input(s): CKTOTAL, CKMB, CKMBINDEX, TROPONINI in the last 72 hours. FASTING LIPID PANEL:  Lab Results   Component Value Date    HDL 69 04/17/2016    TRIG 70 04/17/2016     LIVER PROFILE:No results for input(s): AST, ALT, LABALBU in the last 72 hours. IMPRESSION/RECOMMENDATIONS:  1. NSTEMI. Currently chest pain free, on low dose Levophed and IV heparin, wean as tolerated. Discussed proceeding with coronary angiography and possible PCI, benefits, risks and alternatives explained, bleeding vessel, contrast allergy or nephropathy, death and consent signed and agree to proceed. 2. Pneumonia/Cystitis. Treat per primary service. Discussed with patient and Nurse.     Will Kwon MD  [facility]           [phone]

## 2021-02-23 NOTE — CARE COORDINATION
Case Management Initial Discharge Plan  Cristiane Lou,             Met with:patient to discuss discharge plans. Information verified: address, contacts, phone number, , insurance Yes    Emergency Contact/Next of Kin name & number: daughter/Pennie    [phone]    PCP: Destiny Moreno MD  Date of last visit: 3 months ago    Insurance Provider: medicare and human[employer]    Discharge Planning    Living Arrangements:      Support Systems:       Home has 1 stories  1 stairs to climb to get into front door, 0stairs to climb to reach second floor  Location of bedroom/bathroom in home main    Patient able to perform ADL's:Independent    Current Services (outpatient & in home) none  DME equipment: walker  DME provider: -    Receiving oral anticoagulation therapy? No    If indicated:   Physician managing anticoagulation treatment:   Where does patient obtain lab work for ATC treatment? Potential Assistance Needed:       Patient agreeable to home care: Yes  Freedom of choice provided:  yes    Prior SNF/Rehab Placement and Facility: [facility] [place]  Agreeable to SNF/Rehab: Yes  [place] of choice provided: yes     Evaluation: yes    Expected Discharge date:       Patient expects to be discharged to: Follow Up Appointment: Best Day/ Time:      Transportation provider: daughter  Transportation arrangements needed for discharge: No    Readmission Risk              Risk of Unplanned Readmission:        17             Does patient have a readmission risk score greater than 14?: Yes  If yes, follow-up appointment must be made within 7 days of discharge. Goals of Care:       Discharge Plan: Dg: NSTEMI  Patient lives alone in a 1 story home with 1 step to enter  Independent. Has support from daughter.  Daughter does not live far  Has had [facility] in the past. Does not want them back if she needs [facility] [place], [facility].  Has been to [id number]  Patient uses a walker  [facility]armacy is [facility] in [place]  Has tranpo home  PT/OT to eval  TBD. Continue to follow.           Electronically signed by Joleen Mortimer, RN on 2/23/21 at 4:17 PM EST

## 2021-02-23 NOTE — PROGRESS NOTES
[address] Hospitalist        2/23/2021   11:38 AM    Name:  Julieta Haile  MRN:    [id number]     Acct:     [de-identified]   Room:  2030/2030-01  IP Day: 1     Admit Date: 2/22/2021  7:58 AM  PCP: Lauro Lewis MD    C/C:   Chief Complaint   Patient presents with    Back Pain       Assessment:      · NSTEMI  · Cardiogenic shock  · Chronic systolic CHF  · Acute multifocal pneumonia-likely bacterial  · UTI  · Hypertension  · Depression with anxiety        Plan:        · Patient is currently in ICU  · O2 maintain oxygen saturation greater than 92%  · Low-dose pressor  · Heparin GTT  · Cardiology following home plan for coronary angiography and possible PCI  · Blood cultures no growth so far  · Antibiotics as below  · DVT and GI prophylaxis  · Continue to monitor/telemetry/CBC with differential daily/BMP daily  · Continue medications as below    Scheduled Meds:   doxycycline monohydrate  100 mg Oral 2 times per day    sodium chloride flush  10 mL Intravenous 2 times per day    ARIPiprazole  5 mg Oral Daily    citalopram  20 mg Oral Daily    donepezil  5 mg Oral Nightly    pantoprazole  20 mg Oral QAM AC    [Held by provider] traZODone  100 mg Oral Nightly    cefTRIAXone (ROCEPHIN) IV  1,000 mg Intravenous Q24H     Continuous Infusions:   sodium chloride      sodium chloride 100 mL/hr at 02/23/21 0930    heparin (PORCINE) Infusion Stopped (02/23/21 0926)    norepinephrine Stopped (02/23/21 1030)     PRN Meds:      sodium chloride flush, 10 mL, PRN      acetaminophen, 650 mg, Q4H PRN      heparin (porcine), 60 Units/kg, PRN      heparin (porcine), 30 Units/kg, PRN      promethazine, 12.5 mg, Q6H PRN    Or      ondansetron, 4 mg, Q6H PRN      polyethylene glycol, 17 g, Daily PRN      nicotine, 1 patch, Daily PRN      acetaminophen, 650 mg, Q6H PRN            Subjective:     Patient seen and examined at bedside. No overnight events.    No acute complaints infusion, , Intravenous, Continuous, Marlen Fragoso MD    sodium chloride flush 0.9 % injection 10 mL, 10 mL, Intravenous, 2 times per day, Marlen Fragoso MD    sodium chloride flush 0.9 % injection 10 mL, 10 mL, Intravenous, PRN, Marlen Fragoso MD    acetaminophen (TYLENOL) tablet 650 mg, 650 mg, Oral, Q4H PRN, Marlen Fragoso MD    0.9 % sodium chloride infusion, , Intravenous, Continuous, Blossom Mckeon MD, Last Rate: 100 mL/hr at 02/23/21 0930, Rate Verify at 02/23/21 0930    heparin (porcine) injection 3,950 Units, 60 Units/kg, Intravenous, PRN, Blossom Mckeon MD    heparin (porcine) injection 1,970 Units, 30 Units/kg, Intravenous, PRN, Blossom Mckeon MD    heparin 25,000 units in dextrose 5% 250 mL (premix) infusion, 12 Units/kg/hr, Intravenous, Continuous, Blossom Mckeon MD, Stopped at 02/23/21 1332    norepinephrine (LEVOPHED) 16 mg in dextrose 5 % 250 mL infusion, 2-100 mcg/min, Intravenous, Continuous, Blossom Mckeon MD, Stopped at 02/23/21 1030    ARIPiprazole (ABILIFY) tablet 5 mg, 5 mg, Oral, Daily, Mariel German MD    citalopram (CELEXA) tablet 20 mg, 20 mg, Oral, Daily, Mariel German MD    donepezil (ARICEPT) tablet 5 mg, 5 mg, Oral, Nightly, Mariel German MD, 5 mg at 02/22/21 2051    pantoprazole (PROTONIX) tablet 20 mg, 20 mg, Oral, [facility], Mariel German MD    [Held by provider] traZODone (DESYREL) tablet 100 mg, 100 mg, Oral, Nightly, Mariel German MD, 100 mg at 02/22/21 2051    promethazine (PHENERGAN) tablet 12.5 mg, 12.5 mg, Oral, Q6H PRN **OR** ondansetron (ZOFRAN) injection 4 mg, 4 mg, Intravenous, Q6H PRN, Mariel German MD    polyethylene glycol (GLYCOLAX) packet 17 g, 17 g, Oral, Daily PRN, Mariel German MD    nicotine (NICODERM CQ) 21 MG/24HR 1 patch, 1 patch, Transdermal, Daily PRN, Catrachita Basilio MD    [DISCONTINUED] acetaminophen (TYLENOL) tablet 650 mg, 650 mg, Oral, Q6H PRN **OR** acetaminophen (TYLENOL) suppository 650 mg, 650 mg, Rectal, Q6H PRN, Beck Contreras MD    cefTRIAXone (ROCEPHIN) 1000 mg IVPB in 50 mL D5W minibag, 1,000 mg, Intravenous, Q24H, Beck Contreras MD      I/O (24Hr): Intake/Output Summary (Last 24 hours) at 2/23/2021 1138  Last data filed at 2/23/2021 0400  Gross per 24 hour   Intake 1478.79 ml   Output 1400 ml   Net 78.79 ml       Data:           Labs:    Hematology:  Recent Labs     02/22/21  0834 02/23/21  0330   WBC 10.9 9.9   RBC 3.73* 3.44*   HGB 11.3* 10.7*   HCT 35.3* 33.6*   MCV 94.6 97.7   MCH 30.3 31.1   MCHC 32.0 31.8   RDW 13.5 14.3   * 128*   MPV 10.2 11.0   INR  --  1.2     Chemistry:  Recent Labs     02/22/21  0834 02/22/21  0944 02/22/21  1830 02/23/21  0330     --   --  137   K 4.0  --   --  3.9   CL 99  --   --  101   CO2 29  --   --  29   GLUCOSE 174*  --   --  115*   BUN 25*  --   --  21   CREATININE 0.93*  --   --  0.70   MG 1.8  --   --   --    ANIONGAP 11  --   --  7*   LABGLOM 57*  --   --  >60   GFRAA >60  --   --  >60   CALCIUM 8.8  --   --  7.9*   TROPHS 585* 698* 460* 307*   MYOGLOBIN 192* 229* 92* 56     No results for input(s): PROT, LABALBU, LABA1C, R3HNMIQ, D0UMCAQ, FT4, TSH, AST, ALT, LDH, GGT, ALKPHOS, LABGGT, BILITOT, BILIDIR, AMMONIA, AMYLASE, LIPASE, LACTATE, CHOL, HDL, LDLCHOLESTEROL, CHOLHDLRATIO, TRIG, VLDL, NTS28LZ, PHENYTOIN, PHENYF, URICACID, POCGLU in the last 72 hours.     Lab Results   Component Value Date/Time    SPECIAL LT [facility] 02/22/2021 04:50 PM    SPECIAL LINE 02/22/2021 04:50 PM     Lab Results   Component Value Date/Time    CULTURE NO GROWTH 11 HOURS 02/22/2021 04:50 PM    CULTURE NO GROWTH 11 HOURS 02/22/2021 04:50 PM       No results found for: POCPH, PHART, PH, POCPCO2, NDC2HSA, PCO2, POCPO2, PO2ART, PO2, POCHCO3, MDW7CGA, HCO3, NBEA, PBEA, BEART, BE, THGBART, THB, JBH8EMP, AKGC3FNW, V2TQIEKW, O2SAT, FIO2    Radiology:    Yuliana Krishnamurthy Device Plmt/replace/removal    Result Date: 2/22/2021  Successful ultrasound and fluoroscopy guided non-tunneled triple-lumen catheter placement. Cta Chest Abdomen Pelvis W Contrast    Result Date: 2/22/2021  1. No acute aortic abnormality, aneurysm or dissection. 2.  No evidence for acute pulmonary embolism. 3.  Interstitial infiltrate in the right middle lobe and right lower lobe. Small pleural effusions and dependent atelectasis are also present. This may represent multifocal inflammatory process or infection. 4.  No acute abnormality identified in the abdomen or pelvis. 5.  Status post cholecystectomy. Biliary enlargement is noted, which may represent post cholecystectomy reservoir effect. If biliary laboratory evaluation is abnormal, further evaluation with MRCP or ERCP may be warranted. 6.  No acute osseous abnormality identified in the pelvis or left hip. 7.  Moderate size hiatal hernia. Us Retroperitoneal Complete    Result Date: 2/23/2021  Left kidney is smaller than the right. Otherwise unremarkable exam.     Xr Hip 2-3 Vw W Pelvis Left    Result Date: 2/22/2021  No evidence of acute bony abnormality involving the pelvis or hips, with attention to the left hip. All radiological studies reviewed  Code Status:  Full Code        Electronically signed by Buddy Anand MD on 2/23/2021 at 11:38 AM    This note was created with the assistance of a speech-recognition program.  Although the intention is to generate a document that actually reflects the content of the visit, no guarantees can be provided that every mistake has been identified and corrected by editing. Note was updated later by me after  physical examination and  completion of the assessment.

## 2021-02-24 LAB
ANION GAP SERPL CALCULATED.3IONS-SCNC: 6 MMOL/L (ref 9–17)
BUN BLDV-MCNC: 16 MG/DL (ref 8–23)
BUN/CREAT BLD: 24 (ref 9–20)
CALCIUM SERPL-MCNC: 7.7 MG/DL (ref 8.6–10.4)
CHLORIDE BLD-SCNC: 107 MMOL/L (ref 98–107)
CO2: 29 MMOL/L (ref 20–31)
CREAT SERPL-MCNC: 0.66 MG/DL (ref 0.5–0.9)
GFR AFRICAN AMERICAN: >60 ML/MIN
GFR NON-AFRICAN AMERICAN: >60 ML/MIN
GFR SERPL CREATININE-BSD FRML MDRD: ABNORMAL ML/MIN/{1.73_M2}
GFR SERPL CREATININE-BSD FRML MDRD: ABNORMAL ML/MIN/{1.73_M2}
GLUCOSE BLD-MCNC: 89 MG/DL (ref 70–99)
HCT VFR BLD CALC: 31.4 % (ref 36.3–47.1)
HEMOGLOBIN: 9.6 G/DL (ref 11.9–15.1)
MCH RBC QN AUTO: 30.2 PG (ref 25.2–33.5)
MCHC RBC AUTO-ENTMCNC: 30.6 G/DL (ref 28.4–34.8)
MCV RBC AUTO: 98.7 FL (ref 82.6–102.9)
NRBC AUTOMATED: 0 PER 100 WBC
PDW BLD-RTO: 14 % (ref 11.8–14.4)
PLATELET # BLD: 106 K/UL (ref 138–453)
PMV BLD AUTO: 10.1 FL (ref 8.1–13.5)
POTASSIUM SERPL-SCNC: 4.2 MMOL/L (ref 3.7–5.3)
RBC # BLD: 3.18 M/UL (ref 3.95–5.11)
SODIUM BLD-SCNC: 142 MMOL/L (ref 135–144)
WBC # BLD: 4.8 K/UL (ref 3.5–11.3)

## 2021-02-24 PROCEDURE — 97162 PT EVAL MOD COMPLEX 30 MIN: CPT

## 2021-02-24 PROCEDURE — 85027 COMPLETE CBC AUTOMATED: CPT

## 2021-02-24 PROCEDURE — 97530 THERAPEUTIC ACTIVITIES: CPT

## 2021-02-24 PROCEDURE — 2580000003 HC RX 258: Performed by: INTERNAL MEDICINE

## 2021-02-24 PROCEDURE — 6370000000 HC RX 637 (ALT 250 FOR IP): Performed by: FAMILY MEDICINE

## 2021-02-24 PROCEDURE — 6370000000 HC RX 637 (ALT 250 FOR IP): Performed by: HOSPITALIST

## 2021-02-24 PROCEDURE — 80048 BASIC METABOLIC PNL TOTAL CA: CPT

## 2021-02-24 PROCEDURE — 97535 SELF CARE MNGMENT TRAINING: CPT

## 2021-02-24 PROCEDURE — 2580000003 HC RX 258: Performed by: FAMILY MEDICINE

## 2021-02-24 PROCEDURE — 6360000002 HC RX W HCPCS: Performed by: FAMILY MEDICINE

## 2021-02-24 PROCEDURE — 97166 OT EVAL MOD COMPLEX 45 MIN: CPT

## 2021-02-24 PROCEDURE — 97116 GAIT TRAINING THERAPY: CPT

## 2021-02-24 PROCEDURE — 2580000003 HC RX 258: Performed by: HOSPITALIST

## 2021-02-24 PROCEDURE — 6370000000 HC RX 637 (ALT 250 FOR IP): Performed by: INTERNAL MEDICINE

## 2021-02-24 PROCEDURE — 2060000000 HC ICU INTERMEDIATE R&B

## 2021-02-24 RX ORDER — LISINOPRIL 2.5 MG/1
2.5 TABLET ORAL DAILY
Status: DISCONTINUED | OUTPATIENT
Start: 2021-02-24 | End: 2021-02-24

## 2021-02-24 RX ORDER — ATORVASTATIN CALCIUM 40 MG/1
40 TABLET, FILM COATED ORAL NIGHTLY
Status: DISCONTINUED | OUTPATIENT
Start: 2021-02-24 | End: 2021-02-26 | Stop reason: HOSPADM

## 2021-02-24 RX ORDER — METOPROLOL SUCCINATE 25 MG/1
12.5 TABLET, EXTENDED RELEASE ORAL DAILY
Status: DISCONTINUED | OUTPATIENT
Start: 2021-02-24 | End: 2021-02-24

## 2021-02-24 RX ORDER — ASPIRIN 81 MG/1
81 TABLET, CHEWABLE ORAL DAILY
Status: DISCONTINUED | OUTPATIENT
Start: 2021-02-24 | End: 2021-02-26 | Stop reason: HOSPADM

## 2021-02-24 RX ADMIN — CITALOPRAM HYDROBROMIDE 20 MG: 20 TABLET ORAL at 08:12

## 2021-02-24 RX ADMIN — MAGNESIUM HYDROXIDE 30 ML: 400 SUSPENSION ORAL at 12:54

## 2021-02-24 RX ADMIN — CEFTRIAXONE SODIUM 1000 MG: 1 INJECTION, POWDER, FOR SOLUTION INTRAMUSCULAR; INTRAVENOUS at 15:14

## 2021-02-24 RX ADMIN — DONEPEZIL HYDROCHLORIDE 5 MG: 10 TABLET, FILM COATED ORAL at 21:10

## 2021-02-24 RX ADMIN — ASPIRIN 81 MG: 81 TABLET, CHEWABLE ORAL at 09:24

## 2021-02-24 RX ADMIN — PANTOPRAZOLE SODIUM 20 MG: 20 TABLET, DELAYED RELEASE ORAL at 05:51

## 2021-02-24 RX ADMIN — SODIUM CHLORIDE, PRESERVATIVE FREE 10 ML: 5 INJECTION INTRAVENOUS at 21:11

## 2021-02-24 RX ADMIN — SODIUM CHLORIDE: 9 INJECTION, SOLUTION INTRAVENOUS at 22:09

## 2021-02-24 RX ADMIN — ATORVASTATIN CALCIUM 40 MG: 40 TABLET, FILM COATED ORAL at 21:10

## 2021-02-24 RX ADMIN — ARIPIPRAZOLE 5 MG: 5 TABLET ORAL at 08:12

## 2021-02-24 RX ADMIN — DOXYCYCLINE 100 MG: 100 CAPSULE ORAL at 21:10

## 2021-02-24 RX ADMIN — SODIUM CHLORIDE, PRESERVATIVE FREE 10 ML: 5 INJECTION INTRAVENOUS at 08:12

## 2021-02-24 RX ADMIN — DOXYCYCLINE 100 MG: 100 CAPSULE ORAL at 08:12

## 2021-02-24 ASSESSMENT — PAIN SCALES - GENERAL
PAINLEVEL_OUTOF10: 0

## 2021-02-24 NOTE — PROGRESS NOTES
Physical Therapy    Facility/Department: [facility]  Initial Assessment    NAME: Mary Alice Lou  : 10/28/1932  MRN: [id number]    Date of Service: 2021    Discharge Recommendations:  Subacute/Skilled Nursing Facility, Continue to assess pending progress        Assessment   Body structures, Functions, Activity limitations: Decreased functional mobility ; Decreased ADL status; Decreased strength;Decreased safe awareness;Decreased cognition;Decreased endurance;Decreased balance;Decreased posture  Assessment: Recommend SNF, but continue to assess as patient medical status improves. Patient appears declined from baseline, but patient is a questionable historian. Patient demo decreased gait, transfers, and balance and is a significant fall risk. Prognosis: Good  Decision Making: Medium Complexity  PT Education: Goals;PT Role;Plan of Care;Gait Training;General Safety  Patient Education: Patient educated to fall prevention, safety with gait and transfers, and benefits of being OOB. Patient open to education, but with poor retention. REQUIRES PT FOLLOW UP: Yes  Activity Tolerance  Activity Tolerance: Patient Tolerated treatment well       Patient Diagnosis(es): The encounter diagnosis was NSTEMI (non-ST elevated myocardial infarction) ([facility] [address] has a past medical history of Anemia, Anxiety, Naranjo disease, Bradycardia, Cataracts, bilateral, Colon polyp, Diverticulosis, Fibroid, Forgetfulness, Gait disturbance, Gall stones, Headache(784.0), Hearing loss, HTN (hypertension), Insomnia, Lichen sclerosus, Osteoporosis, Other activity(E029.9), Pap smear for cervical cancer screening, Pneumonia, Vaginal atrophy, and Vaginal discharge. has a past surgical history that includes vulvar/perineal biopsy; Cholecystectomy; blepharoplasty; other surgical history;  Hysterectomy; Breast biopsy; joint replacement (); and Kidney cyst removal.    Restrictions  Restrictions/Precautions  Restrictions/Precautions: General Precautions, Fall Risk  Position Activity Restriction  Other position/activity restrictions: 2 liters 02, telemetry, IV  Vision/Hearing  Vision: Impaired  Vision Exceptions: Wears glasses at all times  Hearing: Exceptions to [facility]  Hearing Exceptions: Hard of hearing/hearing concerns     Subjective  General  Chart Reviewed: Yes  Patient assessed for rehabilitation services?: Yes  Additional Pertinent Hx: Anemia, anxiety / depression, CHF, forgetfulness  Response To Previous Treatment: Not applicable  Family / Caregiver Present: No  Diagnosis: NSTEMI, Pneumonia, UTI  Follows Commands: Within Functional Limits  General Comment  Comments: RN reports patient medically appropriate for PT. Subjective  Subjective: Patient pleasant and agreeable to PT. Patient reports she was having back and hip pain when she came to ER but it is better now. Pain Screening  Patient Currently in Pain: Denies     Pre Treatment Pain Screening  Intervention List: Patient able to continue with treatment    Orientation  Orientation  Overall Orientation Status: Within Normal Limits  Social/Functional History  Social/Functional History  Lives With: Alone(Daughter has been staying with her at night, but works during the day.)  Type of Home: [address] [place] [address],[address]: One level  Home Access: Stairs to enter with rails  Entrance Stairs - Number of Steps: 1  Bathroom Shower/Tub: Walk-in shower  Bathroom Toilet: Standard  Bathroom Equipment: Grab bars in shower, Shower chair  Home Equipment: Wheelchair-manual(rollator)  ADL Assistance: Independent  Homemaking Assistance: Needs assistance(Cleaning lady, daughter makes meals that patient can heat up)  Ambulation Assistance: Independent(uses rollator, will rarely use w/c for very long distances)  Active : No  Patient's  Info: Daughter drives  Occupation: Retired  Type of occupation:   Leisure & Hobbies: TV  Additional Comments: No falls in the last 6 months.   Cognition   Cognition  Overall Cognitive Status: Exceptions  Arousal/Alertness: Appropriate responses to stimuli  Following Commands: Follows one step commands consistently; Follows multistep commands with repitition; Follows multistep commands with increased time  Attention Span: Attends with cues to redirect  Memory: Decreased recall of recent events  Safety Judgement: Decreased awareness of need for assistance;Decreased awareness of need for safety  Problem Solving: Decreased awareness of errors;Assistance required to identify errors made;Assistance required to generate solutions;Assistance required to implement solutions;Assistance required to correct errors made  Insights: Decreased awareness of deficits  Initiation: Requires cues for some  Sequencing: Requires cues for some    Objective     Observation/Palpation  Posture: Poor(kyphotic, trunk and hip flexion in standing.)  Observation: Patient sleeping when PT arrived and reports she has been sleeping most of the day. Patient aroused easily, but fell back to sleep when PT left room briefly to gather supplies. AROM RLE (degrees)  RLE AROM: WFL  AROM LLE (degrees)  LLE AROM : WFL  AROM RUE (degrees)  RUE AROM : WFL  AROM LUE (degrees)  LUE AROM : WFL  Strength RLE  Comment: 4-/5  Strength LLE  Comment: 4-/5  Strength RUE  Comment: shoulders 3+/5, elbows 4-/5  Strength LUE  Comment: shoulders 3+/5, elbows 4-/5  Tone RLE  RLE Tone: Normotonic  Tone LLE  LLE Tone: Normotonic  Motor Control  Gross Motor?: Exceptions  Comments: Lowed motor planning and slowed motor response time. Sensation  Overall Sensation Status: WNL  Bed mobility  Rolling to Left: Supervision  Rolling to Right: Supervision  Supine to Sit: Supervision  Sit to Supine: Supervision  Scooting: Supervision  Comment: PT managed lines and cords, verbal cues to make patient aware of lines and cords.   Transfers  Sit to Stand: Minimal Assistance  Stand to sit: Minimal Assistance  Bed to Chair: Minimal assistance  Stand Pivot Transfers: Minimal Assistance  Comment: RW for transfers, patient educated to push up fom bed and reach back to chair instead of holding on to walker. Patient demo poor retention of education. Patient required multiple attempts to stand from chair. Posterior lean upon initial standing. Ambulation  Ambulation?: Yes  Ambulation 1  Surface: level tile  Device: Rolling Walker  Assistance: Minimal assistance  Quality of Gait: Patient with increased TRINI and flrexed posture, Patient with posterior lean with backing up with ambulation. Patient required assist to maneuver RW around obstacles. Gait Deviations: Shuffles;Decreased step length;Decreased step height;Decreased head and trunk rotation; Increased TRINI; Slow Shanna  Distance: 15 feet x 2     Balance  Sitting - Static: Good;-  Sitting - Dynamic: Fair;+  Standing - Static: Fair;-  Standing - Dynamic: Poor;+  Comments: Patient with posterior lean upon initial standing. Plan   Plan  Times per week: 1-2x/d, 5-6 d/wk  Current Treatment Recommendations: Strengthening, Balance Training, Functional Mobility Training, Transfer Training, Endurance Training, Gait Training, Safety Education & Training, Patient/Caregiver Education & Training  Safety Devices  Type of devices:  All fall risk precautions in place, Gait belt, Call light within reach, Nurse notified, Left in bed, Bed alarm in place, Patient at risk for falls(Patient left in bed due to staff getting ready to move her to another room.)    G-Code       OutComes Score  Balance Score: 2 (02/24/21 1533)  Gait Score: 4 (02/24/21 1533)        Tinetti Total Score: 6 (02/24/21 1533)                                   AM-PAC Score  AM-PAC Inpatient Mobility Raw Score : 16 (02/24/21 1533)  AM-PAC Inpatient T-Scale Score : 40.78 (02/24/21 1533)  Mobility Inpatient CMS 0-100% Score: 54.16 (02/24/21 1533)  Mobility Inpatient CMS G-Code Modifier : CK (02/24/21 1533)          Goals  Short term goals  Time Frame for Short term goals: 12

## 2021-02-24 NOTE — CARE COORDINATION
Discharge Planning    Met with pt to review PT's recommendation for SNF. Pt is agreeable to SNF and has been to [facility] in the past but wants a place with better therapy. List of SNF's provided for pt's review and encouraged her to review it with her family. Message to Trista Mac to follow up on 2-25    The Plan for Transition of Care is related to the following treatment goals: PT'OT for strengthening and fall prevention prior to returning home    The Patient was provided with a choice of provider and agrees   with the discharge plan. [x] Yes [] No    Freedom of choice list was provided with basic dialogue that supports the patient's individualized plan of care/goals, treatment preferences and shares the quality data associated with the providers.  [x] Yes [] No

## 2021-02-24 NOTE — CARE COORDINATION
Social work; called and left message for Gay Sofia daughter as pt wanted to discuss with family which snf to select. Will talk with pt also. Will need teresita and Rx at discharge. Will start hens. Gary metz    Social work; spoke to pt who refused snf at this time. Not sure she will need home care either per pt as she has family to help at home. Pt did agree to call if anything changes.  Link Camila metz

## 2021-02-24 NOTE — PROGRESS NOTES
Physician Progress Note      Jessica Ortega  CSN #:                  [id number]  :                       10/28/1932  ADMIT DATE:       2021 7:58 AM  DISCH DATE:  RESPONDING  PROVIDER #:        Benedict Fisher MD          QUERY TEXT:    Pt admitted with  NSTEMI . Pt noted to have hypotension overnight requiring   medical management. If possible, please document in the progress notes and   discharge summary if you are evaluating and/or treating any of the following: The medical record reflects the following:  Risk Factors: age 79 yo NSTEMI,  s/p Cardiac Cath  Clinical Indicators: Hypotensive over night requiring medical management with   Levophed. BP 80's/50's  MAP 57  Treatment: IV Levophed,  ICU bed  Thank you,  Mayco Rodríguez, TOMAS, CDS  Options provided:  -- Cardiogenic Shock  -- Hypovolemic Shock  -- Hypotension only  -- Other - I will add my own diagnosis  -- Disagree - Not applicable / Not valid  -- Disagree - Clinically unable to determine / Unknown  -- Refer to Clinical Documentation Reviewer    PROVIDER RESPONSE TEXT:    This patient is in cardiogenic shock.     Query created by: Kaycee Pinon on 2021 9:59 AM      Electronically signed by:  Benedict Fisher MD 2021 5:20 PM

## 2021-02-24 NOTE — PROGRESS NOTES
Occupational Therapy   Occupational Therapy Initial Assessment  Date: 2021   Patient Name: Yaa Fraser  MRN: [id number]     : 10/28/1932    RN Isac Ashby reports patient is medically stable for therapy treatment this date. Chart reviewed prior to treatment and patient is agreeable for therapy. All lines intact and patient positioned comfortably at end of treatment. All patient needs addressed prior to ending therapy session. Date of Service: 2021    Discharge Recommendations:  Subacute/Skilled Nursing Facility(If pt/family is declining SNF placement;  supervision or assist and Home OT)  OT Equipment Recommendations  Equipment Needed: Yes  Mobility Devices: ADL Assistive Devices  ADL Assistive Devices: Long-handled Sponge;Long-handled Shoe Horn;Reacher;Emergency Alert System;Grab Bars - toilet    Patient would benefit from SNF for continued occupational therapy to increase independence with  ADL of bathing, dressing, toileting and grooming. Writer recommending SNF placement for for activity tolerance and strength which will increase independence with ADL's coordinated with bed mobility and chair transfers. Continued skilled OT services to address decreased safety awareness with ADL and IADL tasks and for education and increased independence with DME and AE for fall prevention and ec/ws techniques prior to d/c home. Assessment   Performance deficits / Impairments: Decreased functional mobility ; Decreased ADL status; Decreased strength;Decreased safe awareness;Decreased cognition;Decreased endurance;Decreased fine motor control;Decreased high-level IADLs;Decreased posture;Decreased vision/visual deficit; Decreased coordination;Decreased balance  Assessment: Pt would benefit from continued skilled OT services to increase I and safety during functional tasks to return home at prior level of function as able  Prognosis: Fair  Decision Making: Medium Complexity  OT Education: OT Role;Transfer Training;Energy Conservation;Plan of Care;ADL Adaptive Strategies  Patient Education: safety in function, fall prevention/call light use, recommendations for continued therapy, benefits of being OOB  Barriers to Learning: cognitive deficits  REQUIRES OT FOLLOW UP: Yes  Activity Tolerance  Activity Tolerance: Patient Tolerated treatment well  Activity Tolerance: Fair Minus  Safety Devices  Safety Devices in place: Yes  Type of devices: Nurse notified;Call light within reach; Chair alarm in place; Left in chair;Gait belt(elevated BLE on stool with pillow under for increased comfort)           Patient Diagnosis(es): The encounter diagnosis was NSTEMI (non-ST elevated myocardial infarction) ([facility] [address] has a past medical history of Anemia, Anxiety, Naranjo disease, Bradycardia, Cataracts, bilateral, Colon polyp, Diverticulosis, Fibroid, Forgetfulness, Gait disturbance, Gall stones, Headache(784.0), Hearing loss, HTN (hypertension), Insomnia, Lichen sclerosus, Osteoporosis, Other activity(E029.9), Pap smear for cervical cancer screening, Pneumonia, Vaginal atrophy, and Vaginal discharge. has a past surgical history that includes vulvar/perineal biopsy; Cholecystectomy; blepharoplasty; other surgical history; Hysterectomy; Breast biopsy; joint replacement (2011); and Kidney cyst removal.     PER H&P: Ekta Linder is a 80 y.o.  female who presents with Back Pain     Patient brought to the ED by EMS after she had called 911 for c/o pain in left hip. Pt says she could not get out of bed because of that pain. She states the pain had been ongoing since the day before when she sat in a chair. Pt says she did have a loose BM and had vomited yesterday once. Pt says she has been feeling very tired and got exhausted after minimal activity.   Pt says sh e thought it was her hip but when EMTs came over, they told her it was her heart.     Per EMS a 12 lead EKG was done which read 'STEMI' in anterior leads but there were no reciprocal changes. The pt had denied any chest pain, dyspnea, cough, wheezing, headache, vision change, nausea, vomiting, abdominal pain, joint swelling or rash.     Cardiology reviewed EKG in the ER and determined the pt did not have STEMI. The pt was started on Heparin gtt and plan would be to get a Cardiac cath done tomorrow.     Pt was also found to have a UTI. CT chest showed an infiltrates. Left hip eval was unremarkable.     I have personally reviewed the past medical history, past surgical history, medications, social history, and family history, and summarized in the note. Restrictions  Restrictions/Precautions  Restrictions/Precautions: General Precautions, Fall Risk  Position Activity Restriction  Other position/activity restrictions: telemetry, LUE IV, dementia, RUE IV, CVC triple lumen R Jugular    Subjective   General  Chart Reviewed: Yes  Patient assessed for rehabilitation services?: Yes  Family / Caregiver Present: No  Patient Currently in Pain: Denies    Social/Functional History  Social/Functional History  Lives With: Alone(Daughter has been staying with her at night, but works during the day.)  Type of Home: [address] [place] [address],[address]: One level  Home Access: Stairs to enter with rails  Entrance Stairs - Number of Steps: 1  Entrance Stairs - Rails: Right  Bathroom Shower/Tub: Walk-in shower  Bathroom Toilet: Standard  Bathroom Equipment: Grab bars in shower, Shower chair  Home Equipment: [employer], 4 wheeled walker(rollator)  ADL Assistance: Independent  Homemaking Assistance: Needs assistance(Cleaning lady, daughter makes meals that patient can heat up and she cooks some of her meals)  Ambulation Assistance: Independent(uses rollator, will rarely use w/c for very long distances)  Transfer Assistance: Independent  Active : No  Patient's  Info: Daughter drives  Occupation: Retired  Type of occupation: [employer]  Leisure & Hobbies: TV  Additional Comments: Pt denies recent falls. Objective   Vision: Impaired(Pt denies recent visual changes)  Vision Exceptions: Wears glasses at all times  Hearing: Exceptions to WFL(Pt states her hearing aids are at home)  Hearing Exceptions: Hard of hearing/hearing concerns;Bilateral hearing aid    Orientation  Overall Orientation Status: Within Functional Limits  Observation/Palpation  Posture: Poor(kyphotic, trunk and hip flexion in standing with RW)  Observation: fragile skin and bruises on BUE noted  Edema: none     Balance  Sitting Balance: Stand by assistance  Standing Balance: Minimal assistance(with RW)  Standing Balance  Time: standing brenda < 1 min for functional mobility  Functional Mobility  Functional - Mobility Device: Rolling Walker  Activity: (bed to bedside chair)  Assist Level: Minimal assistance  Functional Mobility Comments: Pt required Min verbal cues for RW safety, upright posture, awareness/assist with lines, scanning room all to increase safety. Toilet Transfers  Toilet Transfer: Unable to assess  Toilet Transfers Comments: N/T pt with no needs at this time     ADL  Feeding: Setup  Grooming: Setup;Stand by assistance(to brush hair sitting in bedside chair)  UE Bathing: Setup;Stand by assistance  LE Bathing: Setup; Moderate assistance  UE Dressing: Setup;Minimal assistance(with hosp gown)  LE Dressing: Setup;Maximum assistance(to don B socks sitting EOB. Pt states she typically uses a sock aid at home.  Pt required Max assist for brief change while supine in bed.)  Toileting: Maximum assistance(for hygiene after void in brief while supine in bed.)     Tone RUE  RUE Tone: Normotonic  Tone LUE  LUE Tone: Normotonic  Coordination  Movements Are Fluid And Coordinated: No(Pt has slowed and delayed opposition in B hands)  Coordination and Movement description: Fine motor impairments     Bed mobility  Rolling to Left: Stand by assistance  Rolling to Right: Stand by assistance  Supine to Sit: Stand by assistance  Sit to Supine: Unable to assess(Pt agreed to sit in bedside chair)  Scooting: Stand by assistance  Comment: Pt completed multiple rolls for brief change while supine in bed. Pt with good use of bed rail, min assist for line mgmt. Transfers  Stand Step Transfers: Minimal assistance(with RW)  Sit to stand: Minimal assistance  Stand to sit: Minimal assistance  Transfer Comments: Pt required Min verbal cues for RW safety, upright posture, controlled stand to sit, reaching back to surface prior to sitting, awareness/assist with lines all to increase safety. Stimulating environment provided through opening blinds, turning on lights, turning on TV, turning up volume to appropriate level, raising HOB, and opening door. Cognition  Overall Cognitive Status: Exceptions  Arousal/Alertness: Appropriate responses to stimuli  Following Commands: Follows multistep commands with repitition; Follows multistep commands with increased time; Follows one step commands consistently  Attention Span: Attends with cues to redirect  Memory: Decreased short term memory  Safety Judgement: Decreased awareness of need for assistance;Decreased awareness of need for safety  Problem Solving: Decreased awareness of errors;Assistance required to identify errors made;Assistance required to generate solutions;Assistance required to implement solutions;Assistance required to correct errors made  Insights: Decreased awareness of deficits  Initiation: Requires cues for some  Sequencing: Requires cues for some  Perception  Overall Perceptual Status: WFL     Sensation  Overall Sensation Status: WNL        LUE AROM (degrees)  LUE AROM : WFL  RUE AROM (degrees)  RUE AROM : WFL  LUE Strength  Gross LUE Strength: Exceptions to [facility]  LUE Strength Comment: shld grossly 3+/5, Rest 4-/5  RUE Strength  Gross RUE Strength: Exceptions to [facility]  RUE Strength Comment: shld grossly 3+/5, Rest 4-/5                   Plan   Plan  Times per week: 4-5x/wk 1-2x/day as brenda  Current Treatment Recommendations: Strengthening, Functional Mobility Training, Balance Training, Safety Education & Training, Endurance Training, Patient/Caregiver Education & Training, Equipment Evaluation, Education, & procurement, Self-Care / ADL, Home Management Training                  AM-PAC Score        AM-PAC Inpatient Daily Activity Raw Score: 16 (02/24/21 1639)  AM-PAC Inpatient ADL T-Scale Score : 35.96 (02/24/21 1639)  ADL Inpatient CMS 0-100% Score: 53.32 (02/24/21 1639)  ADL Inpatient CMS G-Code Modifier : CK (02/24/21 1639)    Goals  Short term goals  Time Frame for Short term goals: By discharge, pt to demo  Short term goal 1: bed mobility to Mod I with use of bedrail as needed. Short term goal 2: ADL tranfers and functional mobility to Mod I with use of AD as needed. Short term goal 3: UB ADLs to Set up and LB ADLs to Min A with use of AD/AE as needed. Short term goal 4: increased BUE strength by 1/2 grade to assist with selfcare as needed/I with BUE HEP with use of handouts as needed. Short term goal 5: toileting to Mod I with use of AD/Grab bar as needed. Long term goals  Long term goal 1: Pt/caregiver to demo I with fall prevention/EC/WS tech, recommendations for AE with use of handouts as needed. Patient Goals   Patient goals : To feel better and go home!        Therapy Time   Individual Concurrent Group Co-treatment   Time In 1535(plus 10 min for chart review/RN communication)         Time Out 1613         Minutes 38+10 = [place] 9, OT

## 2021-02-24 NOTE — PROGRESS NOTES
[facility]   Progress Note                   Date:   2/24/2021  Patient name: Aakash Solis  Date of admission:  2/22/2021  7:58 AM  MRN:   [id number]  YOB: 1932  PCP: Marla Haro MD    Reason for Admission:     Subjective:       Clinical Changes / Abnormalities: s/p cath yesterday, no significant CAD. Denies any cp. Was on levophed until 2 am this morning. Medications:   Scheduled Meds:   doxycycline monohydrate  100 mg Oral 2 times per day    sodium chloride flush  10 mL Intravenous 2 times per day    ARIPiprazole  5 mg Oral Daily    citalopram  20 mg Oral Daily    donepezil  5 mg Oral Nightly    pantoprazole  20 mg Oral QAM AC    [Held by provider] traZODone  100 mg Oral Nightly    cefTRIAXone (ROCEPHIN) IV  1,000 mg Intravenous Q24H     Continuous Infusions:   sodium chloride      sodium chloride 100 mL/hr at 02/24/21 0730    heparin (PORCINE) Infusion Stopped (02/23/21 0926)    norepinephrine Stopped (02/24/21 0220)     CBC:   Recent Labs     02/22/21  0834 02/23/21  0330 02/24/21  0550   WBC 10.9 9.9 4.8   HGB 11.3* 10.7* 9.6*   * 128* 106*     BMP:    Recent Labs     02/22/21  0834 02/23/21  0330 02/24/21  0550    137 142   K 4.0 3.9 4.2   CL 99 101 107   CO2 29 29 29   BUN 25* 21 16   CREATININE 0.93* 0.70 0.66   GLUCOSE 174* 115* 89     Hepatic: No results for input(s): AST, ALT, ALB, BILITOT, ALKPHOS in the last 72 hours. Troponin:   Recent Labs     02/22/21  0944 02/22/21  1830 02/23/21  0330   TROPHS 698* 460* 307*     BNP: No results for input(s): BNP in the last 72 hours. Lipids: No results for input(s): CHOL, HDL in the last 72 hours.     Invalid input(s): LDLCALCU  INR:   Recent Labs     02/23/21  0330   INR 1.2       Objective:   Vitals: /70   Pulse 64   Temp 97.9 °F (36.6 °C) (Temporal)   Resp 16   Ht 5' 9\" (1.753 m)   Wt 142 lb (64.4 kg)   SpO2 96%   BMI 20.97 kg/m²   General appearance: alert and cooperative with exam  HEENT: Head: Normocephalic, no lesions, without obvious abnormality. Neck: no adenopathy, no carotid bruit, no JVD, supple, symmetrical, trachea midline and thyroid not enlarged, symmetric, no tenderness/mass/nodules  Lungs: clear to auscultation bilaterally  Heart: regular rate and rhythm, S1, S2 normal, no murmur, click, rub or gallop  Abdomen: soft, non-tender; bowel sounds normal; no masses,  no organomegaly  Extremities: extremities normal, atraumatic, no cyanosis or edema. No site injection hematoma  Neurologic: Mental status: Alert, oriented, thought content appropriate    ECHO:   Results for orders placed during the hospital encounter of 02/22/21   Echocardiogram Limited 2D  Summary  Global left ventricular systolic function is mildly reduced with an  estimated ejection fraction of 40-45 %. There is distal anterior,  anteroseptal and apical hypokinesis. Normal aortic valve structure. Cardiac Angiography:   Results for orders placed or performed during the hospital encounter of 02/22/21   Cardiac Catheterization   Procedure Summary      Mild CAD. Recommendations      Medical therapy and risk factor modifications.        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  - New onset LV dysfunction, HFrEF, NICM, EF 40-45%, with distal anterior/anteroseptal/apical hypokinesis- consistent with Takotsubo cardiomyopathy  - Hypotension- resolving, was on levophed until on 2/24/21 at 2 am  - PNA  - UTI  - Mild Bradycardia  - CKD  - GERD    Plan of Treatment:   - add asa  - add statin  - no ACEi / BB due to hypotension requiring levophed until 2 am this morning, can consider it as outpatient once BP/HR stable  - okay for d/c later today from cardiac, follow up in 2 weeks  - plan to repeat echo in 3 months    D/w pt and nurse    Thank you for allowing me to participate in the care of this patient, please do not hesitate to call if you have any questions. Elsa Serna DO, [facility], [address], [place] [facility]  [email or web address]  [phone]

## 2021-02-24 NOTE — PROGRESS NOTES
[address] Hospitalist        2/24/2021   5:06 PM    Name:  Ceasar Kurtz  MRN:    [id number]     Acct:     [de-identified]   Room:  [address] [place] [address] Day: 2     Admit Date: 2/22/2021  7:58 AM  PCP: Geovanni Cash MD    C/C:   Chief Complaint   Patient presents with    Back Pain       Assessment:      NSTEMI  Acute systolic congestive heart failure, ejection fraction 40 to 45%  Cardiogenic shock  Multifocal pneumonia  Acute cystitis without hematuria  Essential hypertension  Depression with anxiety  Memory impairment  Esophageal reflux disease   Osteoporosis        Plan:      Patient is admitted to progressive unit  Oxygen to keep SPO2 more than 90%  Telemetry  Check vital signs closely  CBC BMP daily  Urine culture  Blood culture, no growth to date  Off IV heparin  IV Rocephin  Oral doxycycline  Patient is started on aspirin and atorvastatin  Continue Celexa  Continue donepezil  Appreciate cardiology assistance, patient status post cardiac catheterization on 2/23/2021, suspect Takotsubo cardiomyopathy  Continue IV antibiotics  Follow-up culture results  Repeat chest x-ray for tomorrow  Continue other medication as below          Scheduled Meds:   aspirin  81 mg Oral Daily    atorvastatin  40 mg Oral Nightly    doxycycline monohydrate  100 mg Oral 2 times per day    sodium chloride flush  10 mL Intravenous 2 times per day    ARIPiprazole  5 mg Oral Daily    citalopram  20 mg Oral Daily    donepezil  5 mg Oral Nightly    pantoprazole  20 mg Oral QAM AC    [Held by provider] traZODone  100 mg Oral Nightly    cefTRIAXone (ROCEPHIN) IV  1,000 mg Intravenous Q24H     Continuous Infusions:   sodium chloride 50 mL/hr at 02/24/21 1055     PRN Meds:      magnesium hydroxide, 30 mL, Daily PRN      sodium chloride flush, 10 mL, PRN      acetaminophen, 650 mg, Q4H PRN      heparin (porcine), 60 Units/kg, PRN      heparin (porcine), 30 Units/kg, PRN      promethazine, 12.5 mg, Q6H PRN    Or      ondansetron, 4 mg, Q6H PRN      polyethylene glycol, 17 g, Daily PRN      nicotine, 1 patch, Daily PRN      acetaminophen, 650 mg, Q6H PRN            Subjective:     Patient seen and examined at bedside. No overnight events. No acute complaints today. Afebrile  Pt. Denies any CP, SOB, palpitation, HA, dizziness, chills, cough, cold, changes in urination, BM or skin changes or any pain. ROS:  A 10 point system reviewed and negative otherwise mentioned above. Physical Examination:      Vitals:  /61   Pulse 62   Temp 97.2 °F (36.2 °C) (Oral)   Resp 15   Ht 5' 9\" (1.753 m)   Wt 142 lb (64.4 kg)   SpO2 95%   BMI 20.97 kg/m²   Temp (24hrs), Av.5 °F (36.4 °C), Min:97.1 °F (36.2 °C), Max:97.9 °F (36.6 °C)    Weight:   Wt Readings from Last 3 Encounters:   21 142 lb (64.4 kg)   20 143 lb (64.9 kg)   20 150 lb (68 kg)     I/O last 3 completed shifts:  I/O last 3 completed shifts: In: 2554.3 [P.O.:1180; I.V.:1374.3]  Out: 1635 [Urine:1635]     No results for input(s): POCGLU in the last 72 hours.       General appearance - alert, well appearing, and in no acute distress  Mental status - oriented to person, place, and time with normal affect  Head - normocephalic and atraumatic  Eyes - pupils equal and reactive, extraocular eye movements intact, conjunctiva clear  Ears - hearing appears to be intact  Nose - no drainage noted  Mouth - mucous membranes moist  Neck - supple, no carotid bruits, thyroid not palpable  Chest - clear to auscultation, normal effort  Heart - normal rate, regular rhythm, no murmur  Abdomen - soft, nontender, nondistended, bowel sounds present all four quadrants, no masses, hepatomegaly or splenomegaly  Neurological - normal speech, no focal findings or movement disorder noted, cranial nerves II through XII grossly intact  Extremities - peripheral pulses palpable, no pedal edema or calf pain with palpation  Skin - no gross lesions, rashes, or induration noted        Medications:      Allergies: No Known Allergies    Current Meds:   Current Facility-Administered Medications:     aspirin chewable tablet 81 mg, 81 mg, Oral, Daily, Colt Gómez DO, 81 mg at 02/24/21 0924    atorvastatin (LIPITOR) tablet 40 mg, 40 mg, Oral, Nightly, Colt Gómez DO    magnesium hydroxide (MILK OF MAGNESIA) 400 MG/5ML suspension 30 mL, 30 mL, Oral, Daily PRN, Molly Polk MD, 30 mL at 02/24/21 1254    doxycycline monohydrate (MONODOX) capsule 100 mg, 100 mg, Oral, 2 times per day, Shazia Bolden MD, 100 mg at 02/24/21 8371    sodium chloride flush 0.9 % injection 10 mL, 10 mL, Intravenous, 2 times per day, Maribel Rivas MD, 10 mL at 02/24/21 2023    sodium chloride flush 0.9 % injection 10 mL, 10 mL, Intravenous, PRN, Maribel Rivas MD    acetaminophen (TYLENOL) tablet 650 mg, 650 mg, Oral, Q4H PRN, Maribel Rivas MD    0.9 % sodium chloride infusion, , Intravenous, Continuous, Molly Polk MD, Last Rate: 50 mL/hr at 02/24/21 1055, Rate Change at 02/24/21 1055    heparin (porcine) injection 3,950 Units, 60 Units/kg, Intravenous, PRN, Rocio Deng MD    heparin (porcine) injection 1,970 Units, 30 Units/kg, Intravenous, PRN, Rocio Deng MD    ARIPiprazole (ABILIFY) tablet 5 mg, 5 mg, Oral, Daily, Mariel German MD, 5 mg at 02/24/21 7974    citalopram (CELEXA) tablet 20 mg, 20 mg, Oral, Daily, Mariel German MD, 20 mg at 02/24/21 8324    donepezil (ARICEPT) tablet 5 mg, 5 mg, Oral, Nightly, Mariel German MD, 5 mg at 02/23/21 2056    pantoprazole (PROTONIX) tablet 20 mg, 20 mg, Oral, QA AC, Mariel German MD, 20 mg at 02/24/21 0551    [Held by provider] traZODone (DESYREL) tablet 100 mg, 100 mg, Oral, Nightly, Mariel German MD, 100 mg at 02/22/21 2051    promethazine (PHENERGAN) tablet 12.5 mg, 12.5 mg, Oral, Q6H PRN **OR** ondansetron (ZOFRAN) injection 4 mg, 4 mg, Intravenous, Q6H PRN, Mariel German MD    polyethylene glycol (GLYCOLAX) packet 17 g, 17 g, Oral, Daily PRN, Mariel German MD, 17 g at 02/23/21 1333    nicotine (NICODERM CQ) 21 MG/24HR 1 patch, 1 patch, Transdermal, Daily PRN, Nella Mcdonald MD    [DISCONTINUED] acetaminophen (TYLENOL) tablet 650 mg, 650 mg, Oral, Q6H PRN **OR** acetaminophen (TYLENOL) suppository 650 mg, 650 mg, Rectal, Q6H PRN, Mariel German MD    cefTRIAXone (ROCEPHIN) 1000 mg IVPB in 50 mL D5W minibag, 1,000 mg, Intravenous, Q24H, Mariel German MD, Stopped at 02/24/21 1620      I/O (24Hr): Intake/Output Summary (Last 24 hours) at 2/24/2021 1706  Last data filed at 2/24/2021 1300  Gross per 24 hour   Intake 2554.31 ml   Output 1635 ml   Net 919.31 ml       Data:           Labs:    Hematology:  Recent Labs     02/22/21  0834 02/23/21  0330 02/24/21  0550   WBC 10.9 9.9 4.8   RBC 3.73* 3.44* 3.18*   HGB 11.3* 10.7* 9.6*   HCT 35.3* 33.6* 31.4*   MCV 94.6 97.7 98.7   MCH 30.3 31.1 30.2   MCHC 32.0 31.8 30.6   RDW 13.5 14.3 14.0   * 128* 106*   MPV 10.2 11.0 10.1   INR  --  1.2  --      Chemistry:  Recent Labs     02/22/21  0834 02/22/21  0944 02/22/21  1830 02/23/21  0330 02/24/21  0550     --   --  137 142   K 4.0  --   --  3.9 4.2   CL 99  --   --  101 107   CO2 29  --   --  29 29   GLUCOSE 174*  --   --  115* 89   BUN 25*  --   --  21 16   CREATININE 0.93*  --   --  0.70 0.66   MG 1.8  --   --   --   --    ANIONGAP 11  --   --  7* 6*   LABGLOM 57*  --   --  >60 >60   GFRAA >60  --   --  >60 >60   CALCIUM 8.8  --   --  7.9* 7.7*   TROPHS 585* 698* 460* 307*  --    MYOGLOBIN 192* 229* 92* 56  --      No results for input(s): PROT, LABALBU, LABA1C, N2WVWRF, I2CABTK, FT4, TSH, AST, ALT, LDH, GGT, ALKPHOS, LABGGT, BILITOT, BILIDIR, AMMONIA, AMYLASE, LIPASE, LACTATE, CHOL, HDL, LDLCHOLESTEROL, CHOLHDLRATIO, TRIG, VLDL, [id number], PHENYTOIN, PHENYF, URICACID, POCGLU in the last 72 hours.     Lab Results   Component Value Date/Time    SPECIAL [facility] 02/22/2021 04:50 PM    SPECIAL LINE 02/22/2021 mistake has been identified and corrected by editing. Note was updated later by me after  physical examination and  completion of the assessment.

## 2021-02-24 NOTE — DISCHARGE INSTR - COC
([facility] D69.6    Elevated brain natriuretic peptide (BNP) level R79.89       Isolation/Infection:   Isolation            Contact          Patient Infection Status       Infection Onset Added Last Indicated Last Indicated By Review Planned Expiration Resolved Resolved By    MDRO (multi-drug resistant organism)  18 Azul Castillo RN        E. Coli - urine 2018      Resolved    COVID-19 Rule Out 20 COVID-19 (Ordered)   20 Rule-Out Test Resulted    COVID-19 20 COVID-19   20     COVID-19 Rule Out 20 COVID-19 (Ordered)   20 Rule-Out Test Resulted            Nurse Assessment:  Last Vital Signs: BP (!) 125/54   Pulse 63   Temp 97.9 °F (36.6 °C) (Temporal)   Resp 16   Ht 5' 9\" (1.753 m)   Wt 142 lb (64.4 kg)   SpO2 95%   BMI 20.97 kg/m²     Last documented pain score (0-10 scale): Pain Level: 0  Last Weight:   Wt Readings from Last 1 Encounters:   21 142 lb (64.4 kg)     Mental Status:  oriented and alert    IV Access:  - None    Nursing Mobility/ADLs:  Walking   Assisted  Transfer  Assisted  Bathing  Independent  Dressing  Independent  Toileting  Assisted  Feeding  Independent  Med Admin  Independent  Med Delivery   whole    Wound Care Documentation and Therapy:        Elimination:  Continence: Bowel: No  Bladder: No  Urinary Catheter: None   Colostomy/Ileostomy/Ileal Conduit: No       Date of Last BM: 2021    Intake/Output Summary (Last 24 hours) at 2021 1015  Last data filed at 2021 0930  Gross per 24 hour   Intake 2314.31 ml   Output 1460 ml   Net 854.31 ml     I/O last 3 completed shifts: In: 1811.3 [P.O.:600; I.V.:1211.3]  Out: 1400 [Urine:1400]    Safety Concerns:      At Risk for Falls    Impairments/Disabilities:      Vision and Hearing    Nutrition Therapy:  Current Nutrition Therapy:   - Oral Diet:  Cardiac    Routes of Feeding: Oral  Liquids: No Restrictions  Daily Fluid Restriction: no  Last Modified Barium Swallow with Video (Video Swallowing Test): not done    Treatments at the Time of Hospital Discharge:   Respiratory Treatments:   Oxygen Therapy:  is not on home oxygen therapy. Ventilator:    - No ventilator support    Rehab Therapies: Physical Therapy and Occupational Therapy  Weight Bearing Status/Restrictions: No weight bearing restirctions  Other Medical Equipment (for information only, NOT a DME order):  walker  Other Treatments:     Patient's personal belongings (please select all that are sent with patient):  Dentures upper and lower    RN SIGNATURE:  Electronically signed by Jonh Jean RN on 2/25/21 at 6:59 PM EST    CASE MANAGEMENT/SOCIAL WORK SECTION    Inpatient Status Date: ***    Readmission Risk Assessment Score:  Readmission Risk              Risk of Unplanned Readmission:        17           Discharging to Facility/ Agency   Name: Jackie  Address:  Phone:[phone]  Fax:[phone]    Dialysis Facility (if applicable)   Name:  Address:  Dialysis Schedule:  Phone:  Fax:    / signature: Electronically signed by NADINE Cuadra LSW on 2/26/21 at 8:51 AM EST    PHYSICIAN SECTION    Prognosis: Good    Condition at Discharge: Stable    Rehab Potential (if transferring to Rehab): Good    Recommended Labs or Other Treatments After Discharge: Diagnosis:    Skilled Nursing per hospital recommendation ( Monitor Vitals,pain, Mental status, daily weight Blood sugar monitoring TIDAC.) Call MD if Blood sugar<60 or > 350,Fall precaution, wound care krueger catheter removal as per Nursing home attending)  Skilled OT  Physical Therapy  Daily Labs: cbc,bmp q weekly  Monitor INR Daily if pt on coumadin   Coumadin management per SNF admitting physician unless otherwise specified. Oxygen 2L/minute   Blood sugar accucheck TIDAC  Daily Pulse oxymetry  Continue CPAP/BIPAP if pt wearing at home.   Catheter/drain care per surgery  If pt on Tube feeding- please continue per hospital orders. Physician Certification: I certify the above information and transfer of Evelin Álvarez  is necessary for the continuing treatment of the diagnosis listed and that she requires [facility] for greater 30 days.      Update Admission H&P: No change in H&P    PHYSICIAN SIGNATURE:  Electronically signed by Sheron Lange MD on 2/26/21 at 9:29 AM EST

## 2021-02-25 ENCOUNTER — APPOINTMENT (OUTPATIENT)
Dept: GENERAL RADIOLOGY | Age: 86
DRG: 280 | End: 2021-02-25
Payer: MEDICARE

## 2021-02-25 LAB
ANION GAP SERPL CALCULATED.3IONS-SCNC: 6 MMOL/L (ref 9–17)
BUN BLDV-MCNC: 19 MG/DL (ref 8–23)
BUN/CREAT BLD: 32 (ref 9–20)
CALCIUM SERPL-MCNC: 8.1 MG/DL (ref 8.6–10.4)
CHLORIDE BLD-SCNC: 102 MMOL/L (ref 98–107)
CO2: 31 MMOL/L (ref 20–31)
CREAT SERPL-MCNC: 0.6 MG/DL (ref 0.5–0.9)
GFR AFRICAN AMERICAN: >60 ML/MIN
GFR NON-AFRICAN AMERICAN: >60 ML/MIN
GFR SERPL CREATININE-BSD FRML MDRD: ABNORMAL ML/MIN/{1.73_M2}
GFR SERPL CREATININE-BSD FRML MDRD: ABNORMAL ML/MIN/{1.73_M2}
GLUCOSE BLD-MCNC: 92 MG/DL (ref 70–99)
HCT VFR BLD CALC: 32.2 % (ref 36.3–47.1)
HEMOGLOBIN: 10.1 G/DL (ref 11.9–15.1)
MCH RBC QN AUTO: 30.5 PG (ref 25.2–33.5)
MCHC RBC AUTO-ENTMCNC: 31.4 G/DL (ref 28.4–34.8)
MCV RBC AUTO: 97.3 FL (ref 82.6–102.9)
NRBC AUTOMATED: 0 PER 100 WBC
PDW BLD-RTO: 13.8 % (ref 11.8–14.4)
PLATELET # BLD: 118 K/UL (ref 138–453)
PMV BLD AUTO: 10.5 FL (ref 8.1–13.5)
POTASSIUM SERPL-SCNC: 4.1 MMOL/L (ref 3.7–5.3)
RBC # BLD: 3.31 M/UL (ref 3.95–5.11)
SODIUM BLD-SCNC: 139 MMOL/L (ref 135–144)
WBC # BLD: 5 K/UL (ref 3.5–11.3)

## 2021-02-25 PROCEDURE — 2580000003 HC RX 258: Performed by: INTERNAL MEDICINE

## 2021-02-25 PROCEDURE — 97116 GAIT TRAINING THERAPY: CPT

## 2021-02-25 PROCEDURE — 85027 COMPLETE CBC AUTOMATED: CPT

## 2021-02-25 PROCEDURE — 6370000000 HC RX 637 (ALT 250 FOR IP): Performed by: HOSPITALIST

## 2021-02-25 PROCEDURE — 6370000000 HC RX 637 (ALT 250 FOR IP): Performed by: INTERNAL MEDICINE

## 2021-02-25 PROCEDURE — 97129 THER IVNTJ 1ST 15 MIN: CPT

## 2021-02-25 PROCEDURE — 97530 THERAPEUTIC ACTIVITIES: CPT

## 2021-02-25 PROCEDURE — 2060000000 HC ICU INTERMEDIATE R&B

## 2021-02-25 PROCEDURE — 6370000000 HC RX 637 (ALT 250 FOR IP): Performed by: FAMILY MEDICINE

## 2021-02-25 PROCEDURE — 6360000002 HC RX W HCPCS: Performed by: FAMILY MEDICINE

## 2021-02-25 PROCEDURE — 71045 X-RAY EXAM CHEST 1 VIEW: CPT

## 2021-02-25 PROCEDURE — 97110 THERAPEUTIC EXERCISES: CPT

## 2021-02-25 PROCEDURE — 80048 BASIC METABOLIC PNL TOTAL CA: CPT

## 2021-02-25 PROCEDURE — 2580000003 HC RX 258: Performed by: FAMILY MEDICINE

## 2021-02-25 RX ADMIN — ASPIRIN 81 MG: 81 TABLET, CHEWABLE ORAL at 08:47

## 2021-02-25 RX ADMIN — DOXYCYCLINE 100 MG: 100 CAPSULE ORAL at 19:42

## 2021-02-25 RX ADMIN — DONEPEZIL HYDROCHLORIDE 5 MG: 10 TABLET, FILM COATED ORAL at 19:42

## 2021-02-25 RX ADMIN — ARIPIPRAZOLE 5 MG: 5 TABLET ORAL at 08:47

## 2021-02-25 RX ADMIN — DOXYCYCLINE 100 MG: 100 CAPSULE ORAL at 08:46

## 2021-02-25 RX ADMIN — CITALOPRAM HYDROBROMIDE 20 MG: 20 TABLET ORAL at 08:46

## 2021-02-25 RX ADMIN — MAGNESIUM HYDROXIDE 30 ML: 400 SUSPENSION ORAL at 10:50

## 2021-02-25 RX ADMIN — PANTOPRAZOLE SODIUM 20 MG: 20 TABLET, DELAYED RELEASE ORAL at 06:43

## 2021-02-25 RX ADMIN — CEFTRIAXONE SODIUM 1000 MG: 1 INJECTION, POWDER, FOR SOLUTION INTRAMUSCULAR; INTRAVENOUS at 14:30

## 2021-02-25 RX ADMIN — SODIUM CHLORIDE, PRESERVATIVE FREE 10 ML: 5 INJECTION INTRAVENOUS at 08:47

## 2021-02-25 RX ADMIN — ATORVASTATIN CALCIUM 40 MG: 40 TABLET, FILM COATED ORAL at 19:42

## 2021-02-25 RX ADMIN — ACETAMINOPHEN 650 MG: 325 TABLET ORAL at 20:54

## 2021-02-25 RX ADMIN — SODIUM CHLORIDE, PRESERVATIVE FREE 10 ML: 5 INJECTION INTRAVENOUS at 19:43

## 2021-02-25 ASSESSMENT — PAIN SCALES - GENERAL: PAINLEVEL_OUTOF10: 4

## 2021-02-25 ASSESSMENT — PAIN DESCRIPTION - PAIN TYPE: TYPE: ACUTE PAIN

## 2021-02-25 ASSESSMENT — PAIN DESCRIPTION - DESCRIPTORS: DESCRIPTORS: ACHING

## 2021-02-25 ASSESSMENT — PAIN DESCRIPTION - LOCATION: LOCATION: HEAD

## 2021-02-25 ASSESSMENT — PAIN DESCRIPTION - ORIENTATION: ORIENTATION: MID

## 2021-02-25 NOTE — CARE COORDINATION
Social work: Met with patient at bedside to discuss SNF again(pt had been refusing). SW informed patient her dtr cannot stay with her 24/7 and she is also leaving to go out of town therefore SNF is best option at this time. Patient is now agreeable to snf and is requesting [facility] instead of [address] [place] [address]. Spoke with [facility], they are able to accept. [facility] ARRANGED FOR TRANSPORT AT 11:00 AM Friday TO [address] [place] [address]. RN,physician and [address] [place] [address] liaison informed of transport time.

## 2021-02-25 NOTE — PROGRESS NOTES
cervical cancer screening, Pneumonia, Vaginal atrophy, and Vaginal discharge. has a past surgical history that includes vulvar/perineal biopsy; Cholecystectomy; blepharoplasty; other surgical history; Hysterectomy; Breast biopsy; joint replacement (2011); and Kidney cyst removal.    Restrictions  Restrictions/Precautions  Restrictions/Precautions: General Precautions, Fall Risk, Up as Tolerated  Required Braces or Orthoses?: No  Position Activity Restriction  Other position/activity restrictions: telemetry, LUE IV, dementia, RUE IV, CVC triple lumen R Jugular  Subjective   General  Chart Reviewed: Yes  Patient assessed for rehabilitation services?: Yes  Response to previous treatment: Patient with no complaints from previous session  Family / Caregiver Present: No      Orientation  Orientation  Overall Orientation Status: Within Normal Limits  Orientation Level: Oriented X4  Objective             Balance  Sitting Balance: Independent  Standing Balance: Stand by assistance  Functional Mobility  Functional - Mobility Device: Rolling Walker  Activity: Other  Assist Level: Contact guard assistance  Functional Mobility Comments: Pt completed functional mobility around room using RW with CGA for safety and writer managed IV. Bed mobility  Supine to Sit: Stand by assistance  Sit to Supine: Stand by assistance  Comment: verbal cues for technique and IV mgmt  Transfers  Stand Step Transfers: Contact guard assistance  Sit to stand: Stand by assistance  Stand to sit: Stand by assistance  Transfer Comments: Verbal cues for hand placement and overall safety, writer managed IV. Cognition  Overall Cognitive Status: Exceptions  Arousal/Alertness: Appropriate responses to stimuli  Following Commands:  Follows multistep commands consistently  Attention Span: Appears intact  Memory: Appears intact  Safety Judgement: Decreased awareness of need for safety  Problem Solving: Assistance required to identify errors

## 2021-02-25 NOTE — PROGRESS NOTES
Physical Therapy  Facility/Department: [facility]  Daily Treatment Note  NAME: Ottoniel Lou  : 10/28/1932  MRN: [id number]    Date of Service: 2021    Discharge Recommendations:  Subacute/Skilled Nursing Facility        Assessment   Body structures, Functions, Activity limitations: Decreased functional mobility ; Decreased ADL status; Decreased strength;Decreased safe awareness;Decreased cognition;Decreased endurance;Decreased balance;Decreased posture  Assessment: Recommend SNF, but continue to assess as patient medical status improves. Patient appears declined from baseline, but patient is a questionable historian. Patient demo decreased gait, transfers, and balance and is a significant fall risk. Prognosis: Good  Activity Tolerance  Activity Tolerance: Patient Tolerated treatment well  Activity Tolerance: HR WNL during tx     Patient Diagnosis(es): The encounter diagnosis was NSTEMI (non-ST elevated myocardial infarction) ([facility] has a past medical history of Anemia, Anxiety, Naranjo disease, Bradycardia, Cataracts, bilateral, Colon polyp, Diverticulosis, Fibroid, Forgetfulness, Gait disturbance, Gall stones, Headache(784.0), Hearing loss, HTN (hypertension), Insomnia, Lichen sclerosus, Osteoporosis, Other activity(E029.9), Pap smear for cervical cancer screening, Pneumonia, Vaginal atrophy, and Vaginal discharge. has a past surgical history that includes vulvar/perineal biopsy; Cholecystectomy; blepharoplasty; other surgical history; Hysterectomy; Breast biopsy; joint replacement (); and Kidney cyst removal.    Restrictions  Restrictions/Precautions  Restrictions/Precautions: General Precautions, Fall Risk  Position Activity Restriction  Other position/activity restrictions: telemetry, LUE IV, dementia, RUE IV, CVC triple lumen R Jugular  Subjective   General  Chart Reviewed:  Yes  Additional Pertinent Hx: Anemia, anxiety / depression, CHF, forgetfulness  Response To Previous Treatment: Patient with no complaints from previous session. Family / Caregiver Present: No  Subjective  Subjective: Patient pleasant and agreeable to PT. General Comment  Comments: Ok for PT per Natalie Troy  Pain Screening  Patient Currently in Pain: Denies  Vital Signs  Patient Currently in Pain: Denies  Oxygen Therapy  O2 Device: None (Room air)       Orientation  Orientation  Overall Orientation Status: Within Normal Limits  Cognition      Objective      Transfers  Sit to Stand: Minimal Assistance  Stand to sit: Minimal Assistance  Stand Pivot Transfers: Minimal Assistance  Comment: RW for transfers, patient educated to push up fom chair and reach back to chair instead of holding on to walker. Patient demo poor retention of education. Patient lacks eccentric control when sitting and \"plops\" down even with MAX cueing. Posterior lean upon initial standing, forward head posture. Ambulation  Ambulation?: Yes  More Ambulation?: Yes  Ambulation 1  Surface: level tile  Device: Rolling Walker  Assistance: Minimal assistance  Quality of Gait: Patient with increased TRINI and flrexed posture, Patient with posterior lean with backing up with ambulation. Patient required assist to maneuver RW around obstacles. Gait Deviations: Shuffles;Decreased step length;Decreased step height;Decreased head and trunk rotation; Increased TRINI; Slow Shanna  Distance: 20'  Comments: Amb x1 around room then sat for rest break as pt stated her legs were weak. Ambulation 2  Surface - 2: level tile  Device 2: Rolling Walker  Assistance 2: Minimal assistance  Quality of Gait 2: Patient with increased TRINI and flrexed posture, Patient with posterior lean with backing up with ambulation. Patient required assist to maneuver RW around obstacles. Gait Deviations: Shuffles;Decreased head and trunk rotation;Decreased step height;Decreased step length; Increased TRINI  Distance: 20'  Comments: Amb x1 around room and back to bedside chair for seated exercises.

## 2021-02-25 NOTE — PROGRESS NOTES
PRN      promethazine, 12.5 mg, Q6H PRN    Or      ondansetron, 4 mg, Q6H PRN      polyethylene glycol, 17 g, Daily PRN      nicotine, 1 patch, Daily PRN      acetaminophen, 650 mg, Q6H PRN            Subjective:     I have seen and examined patient today, patient last 24 hours events were reviewed also discussed with nursing staff  No new complaints  Overnight patient did not had any acute issues  No nausea vomiting diarrhea  Afebrile  Pt. Denies any CP, SOB, palpitation, HA, dizziness, chills, cough, cold, changes in urination, BM or skin changes . ROS:  A 10 point system reviewed and negative otherwise mentioned above. Physical Examination:      Vitals:  /73   Pulse 61   Temp 97.2 °F (36.2 °C) (Oral)   Resp 16   Ht 5' 9\" (1.753 m)   Wt 153 lb 12.8 oz (69.8 kg)   SpO2 93%   BMI 22.71 kg/m²   Temp (24hrs), Av.6 °F (36.4 °C), Min:97.2 °F (36.2 °C), Max:98.1 °F (36.7 °C)    Weight:   Wt Readings from Last 3 Encounters:   21 153 lb 12.8 oz (69.8 kg)   20 143 lb (64.9 kg)   20 150 lb (68 kg)     I/O last 3 completed shifts:  I/O last 3 completed shifts: In: 2150 [P.O.:750; I.V.:1400]  Out: -      No results for input(s): POCGLU in the last 72 hours.       General appearance - alert, well appearing, and in no acute distress  Mental status - oriented to person, place, and time with normal affect  Head - normocephalic and atraumatic  Eyes - pupils equal and reactive, extraocular eye movements intact, conjunctiva clear  Ears - hearing appears to be intact  Nose - no drainage noted  Mouth - mucous membranes moist  Neck - supple, no carotid bruits, thyroid not palpable  Chest - clear to auscultation, normal effort  Heart - normal rate, regular rhythm, no murmur  Abdomen - soft, nontender, nondistended, bowel sounds present all four quadrants, no masses, hepatomegaly or splenomegaly  Neurological - normal speech, no focal findings or movement disorder noted, cranial nerves II through XII grossly intact  Extremities - peripheral pulses palpable, no pedal edema or calf pain with palpation  Skin - no gross lesions, rashes, or induration noted        Medications:      Allergies: No Known Allergies    Current Meds:   Current Facility-Administered Medications:     aspirin chewable tablet 81 mg, 81 mg, Oral, Daily, Colt Rico, DO, 81 mg at 02/25/21 0847    atorvastatin (LIPITOR) tablet 40 mg, 40 mg, Oral, Nightly, Colt Rico, DO, 40 mg at 02/24/21 2110    magnesium hydroxide (MILK OF MAGNESIA) 400 MG/5ML suspension 30 mL, 30 mL, Oral, Daily PRN, Lenka Rosales MD, 30 mL at 02/25/21 1050    doxycycline monohydrate (MONODOX) capsule 100 mg, 100 mg, Oral, 2 times per day, Pastor Montes MD, 100 mg at 02/25/21 0846    sodium chloride flush 0.9 % injection 10 mL, 10 mL, Intravenous, 2 times per day, Senia Torres MD, 10 mL at 02/25/21 0847    sodium chloride flush 0.9 % injection 10 mL, 10 mL, Intravenous, PRN, Senia Torres MD    acetaminophen (TYLENOL) tablet 650 mg, 650 mg, Oral, Q4H PRN, Senia Torres MD    0.9 % sodium chloride infusion, , Intravenous, Continuous, Lenka Rosales MD, Last Rate: 50 mL/hr at 02/24/21 2209, New Bag at 02/24/21 2209    ARIPiprazole (ABILIFY) tablet 5 mg, 5 mg, Oral, Daily, Mariel German MD, 5 mg at 02/25/21 0847    citalopram (CELEXA) tablet 20 mg, 20 mg, Oral, Daily, Mariel German MD, 20 mg at 02/25/21 0846    donepezil (ARICEPT) tablet 5 mg, 5 mg, Oral, Nightly, Mariel German MD, 5 mg at 02/24/21 2110    pantoprazole (PROTONIX) tablet 20 mg, 20 mg, Oral, QAM AC, Mariel German MD, 20 mg at 02/25/21 0643    [Held by provider] traZODone (DESYREL) tablet 100 mg, 100 mg, Oral, Nightly, Mariel German MD, 100 mg at 02/22/21 2051    promethazine (PHENERGAN) tablet 12.5 mg, 12.5 mg, Oral, Q6H PRN **OR** ondansetron (ZOFRAN) injection 4 mg, 4 mg, Intravenous, Q6H PRN, Mariel German MD    polyethylene glycol (GLYCOLAX) packet 17 g, 17 g, Oral, Daily PRN, Mariel German MD, 17 g at 02/23/21 1333    nicotine (NICODERM CQ) 21 MG/24HR 1 patch, 1 patch, Transdermal, Daily PRN, Cristian Patterson MD    [DISCONTINUED] acetaminophen (TYLENOL) tablet 650 mg, 650 mg, Oral, Q6H PRN **OR** acetaminophen (TYLENOL) suppository 650 mg, 650 mg, Rectal, Q6H PRN, Mariel German MD    cefTRIAXone (ROCEPHIN) 1000 mg IVPB in 50 mL D5W minibag, 1,000 mg, Intravenous, Q24H, Mariel German MD, Stopped at 02/25/21 1603      I/O (24Hr): Intake/Output Summary (Last 24 hours) at 2/25/2021 1708  Last data filed at 2/25/2021 1411  Gross per 24 hour   Intake 2150 ml   Output --   Net 2150 ml       Data:           Labs:    Hematology:  Recent Labs     02/23/21  0330 02/24/21  0550 02/25/21  0500   WBC 9.9 4.8 5.0   RBC 3.44* 3.18* 3.31*   HGB 10.7* 9.6* 10.1*   HCT 33.6* 31.4* 32.2*   MCV 97.7 98.7 97.3   MCH 31.1 30.2 30.5   MCHC 31.8 30.6 31.4   RDW 14.3 14.0 13.8   * 106* 118*   MPV 11.0 10.1 10.5   INR 1.2  --   --      Chemistry:  Recent Labs     02/22/21  1830 02/23/21  0330 02/24/21  0550 02/25/21  0500   NA  --  137 142 139   K  --  3.9 4.2 4.1   CL  --  101 107 102   CO2  --  29 29 31   GLUCOSE  --  115* 89 92   BUN  --  21 16 19   CREATININE  --  0.70 0.66 0.60   ANIONGAP  --  7* 6* 6*   LABGLOM  --  >60 >60 >60   GFRAA  --  >60 >60 >60   CALCIUM  --  7.9* 7.7* 8.1*   TROPHS 460* 307*  --   --    MYOGLOBIN 92* 56  --   --      No results for input(s): PROT, LABALBU, LABA1C, P4SYQKB, A2MGXQM, FT4, TSH, AST, ALT, LDH, GGT, ALKPHOS, LABGGT, BILITOT, BILIDIR, AMMONIA, AMYLASE, LIPASE, LACTATE, CHOL, HDL, LDLCHOLESTEROL, CHOLHDLRATIO, TRIG, VLDL, [id number], PHENYTOIN, PHENYF, URICACID, POCGLU in the last 72 hours.     Lab Results   Component Value Date/Time    SPECIAL [facility] 02/22/2021 04:50 PM    SPECIAL LINE 02/22/2021 04:50 PM     Lab Results   Component Value Date/Time    CULTURE NO GROWTH 3 DAYS 02/22/2021 04:50 PM    CULTURE NO GROWTH 3 DAYS 02/22/2021 04:50 PM       No

## 2021-02-25 NOTE — PROGRESS NOTES
Physician Progress Note      Lindsey Saul  [facility] #:                  [id number]  :                       10/28/1932  ADMIT DATE:       2021 7:58 AM  DISCH DATE:  RESPONDING  PROVIDER #:        Leigh Ann Vora MD          QUERY TEXT:    Patient admitted with NSTEMI . Noted documentation of CHF as chronic   systolic CHF in H& P and then subsequent PN states Acute on Chronic systolic   CHF. If possible, please document in progress notes and discharge summary if   you are evaluating and /or treating any of the following:     The medical record reflects the following:  Risk Factors:  age 80 w NSTEMI  Clinical Indicators: Cardiac echo completed, EF 40-45% ; no BNP lab this   admission , fatigue w exertion, no pedal edema  H & P is documenting Chronic Systolic CHF, on  note it is stated as Acute   on Chronic CHF  Treatment: Telemetry, Heparin drip,  Thank you,  Lolis Henderson, MSN, CDs  Options provided:  -- Acute on Chronic systolic CHF  -- Chronic Systolic CHF  -- Other - I will add my own diagnosis  -- Disagree - Not applicable / Not valid  -- Disagree - Clinically unable to determine / Unknown  -- Refer to Clinical Documentation Reviewer    PROVIDER RESPONSE TEXT:    Acute on Chronic systolic CHF    Query created by: Haider Dalton on 2021 7:38 AM      Electronically signed by:  Leigh Ann Vora MD 2021 5:09 PM

## 2021-02-25 NOTE — CARE COORDINATION
DC Planning    Spoke with pt, she does not feel she needs to go to ecf at this time. She is agreeable to vns. She relays her dtr stays with her most nights. Spoke with Dtr Zarina Marshall will not be available to stay with her she will be going out of town. They tried to make arrangements for another family member to stay with her but circumstances have changed and nobody can stay with her. She would like writer and LSW to convince her to go to facility-she would like pt to go to [facility] [place], [place]. Pt was on a Heparin gtt now off, no indication noted for [facility].

## 2021-02-26 VITALS
HEART RATE: 60 BPM | TEMPERATURE: 97.8 F | OXYGEN SATURATION: 95 % | RESPIRATION RATE: 18 BRPM | HEIGHT: 69 IN | WEIGHT: 148.06 LBS | SYSTOLIC BLOOD PRESSURE: 140 MMHG | BODY MASS INDEX: 21.93 KG/M2 | DIASTOLIC BLOOD PRESSURE: 71 MMHG

## 2021-02-26 LAB
SARS-COV-2, RAPID: NOT DETECTED
SPECIMEN DESCRIPTION: NORMAL

## 2021-02-26 PROCEDURE — 97535 SELF CARE MNGMENT TRAINING: CPT

## 2021-02-26 PROCEDURE — U0002 COVID-19 LAB TEST NON-CDC: HCPCS

## 2021-02-26 PROCEDURE — 6370000000 HC RX 637 (ALT 250 FOR IP): Performed by: FAMILY MEDICINE

## 2021-02-26 PROCEDURE — 6370000000 HC RX 637 (ALT 250 FOR IP): Performed by: INTERNAL MEDICINE

## 2021-02-26 RX ORDER — CEFDINIR 300 MG/1
300 CAPSULE ORAL 2 TIMES DAILY
Qty: 10 CAPSULE | Refills: 0 | Status: SHIPPED | OUTPATIENT
Start: 2021-02-26 | End: 2021-03-03

## 2021-02-26 RX ORDER — ATORVASTATIN CALCIUM 40 MG/1
40 TABLET, FILM COATED ORAL NIGHTLY
Qty: 30 TABLET | Refills: 3 | Status: SHIPPED | OUTPATIENT
Start: 2021-02-26

## 2021-02-26 RX ORDER — ASPIRIN 81 MG/1
81 TABLET, CHEWABLE ORAL DAILY
Qty: 30 TABLET | Refills: 3 | Status: SHIPPED | OUTPATIENT
Start: 2021-02-26

## 2021-02-26 RX ADMIN — DOXYCYCLINE 100 MG: 100 CAPSULE ORAL at 09:27

## 2021-02-26 RX ADMIN — ARIPIPRAZOLE 5 MG: 5 TABLET ORAL at 09:28

## 2021-02-26 RX ADMIN — CITALOPRAM HYDROBROMIDE 20 MG: 20 TABLET ORAL at 09:27

## 2021-02-26 RX ADMIN — ASPIRIN 81 MG: 81 TABLET, CHEWABLE ORAL at 09:28

## 2021-02-26 ASSESSMENT — PAIN SCALES - GENERAL: PAINLEVEL_OUTOF10: 0

## 2021-02-26 NOTE — PLAN OF CARE
Problem: Falls - Risk of:  Goal: Will remain free from falls  Description: Will remain free from falls  Outcome: Met This Shift  Goal: Absence of physical injury  Description: Absence of physical injury  Outcome: Met This Shift     Problem: Pain:  Goal: Pain level will decrease  Description: Pain level will decrease  Outcome: Ongoing  Goal: Control of acute pain  Description: Control of acute pain  Outcome: Ongoing  Goal: Control of chronic pain  Description: Control of chronic pain  Outcome: Ongoing     Problem: Cardiac:  Goal: Ability to maintain vital signs within normal range will improve  Description: Ability to maintain vital signs within normal range will improve  Outcome: Ongoing  Goal: Cardiovascular alteration will improve  Description: Cardiovascular alteration will improve  Outcome: Ongoing     Problem: Health Behavior:  Goal: Will modify at least one risk factor affecting health status  Description: Will modify at least one risk factor affecting health status  Outcome: Ongoing  Goal: Identification of resources available to assist in meeting health care needs will improve  Description: Identification of resources available to assist in meeting health care needs will improve  Outcome: Ongoing     Problem: Physical Regulation:  Goal: Complications related to the disease process, condition or treatment will be avoided or minimized  Description: Complications related to the disease process, condition or treatment will be avoided or minimized  Outcome: Ongoing     Problem: Skin Integrity:  Goal: Will show no infection signs and symptoms  Description: Will show no infection signs and symptoms  Outcome: Ongoing  Goal: Absence of new skin breakdown  Description: Absence of new skin breakdown  Outcome: Ongoing
Problem: Pain:  Goal: Pain level will decrease  Description: Pain level will decrease  Outcome: Met This Shift  Note: Patient's pain has been well controlled throughout the entire shift, please see MAR. Problem: Falls - Risk of:  Goal: Will remain free from falls  Description: Will remain free from falls  Outcome: Met This Shift  Note: The patient remained free from falls this shift, call light within reach, bed in locked and lowest position. Side rails up x2. Continue to monitor closely. Problem: Cardiac:  Goal: Ability to maintain vital signs within normal range will improve  Description: Ability to maintain vital signs within normal range will improve  Outcome: Ongoing  Note: The patient remains hemodynamically stable. Vital signs within defined limits. Problem: Skin Integrity:  Goal: Will show no infection signs and symptoms  Description: Will show no infection signs and symptoms  Outcome: Met This Shift  Note: The patient's skin remains dry and intact. Assist patient with turning Q2 hours and PRN.
Problem: Pain:  Goal: Pain level will decrease  Description: Pain level will decrease  Outcome: Met This Shift  Pt denied pain during shift. Problem: Falls - Risk of:  Goal: Will remain free from falls  Description: Will remain free from falls  Outcome: Met This Shift  Patient is a fall risk during this admission. Fall risk assessment was performed. Patient is absent of falls. Bed is in the lowest position. Wheels on the bed are locked. Call light and bed side table are within reach. Clutter is removed. Patient was educated to call out when needing assistance or wanting to get out of bed. Patient offered toileting assistance during rounding. Hourly rounds have been performed. Problem: Cardiac:  Goal: Ability to maintain vital signs within normal range will improve  Description: Ability to maintain vital signs within normal range will improve  Outcome: Ongoing       Problem: Skin Integrity:  Goal: Will show no infection signs and symptoms  Description: Will show no infection signs and symptoms  Outcome: Ongoing   Skin assessment complete. Waffle mattress in place. Pt turned and repositioned every two hours with assistance from staff. Area kept free from moisture. Proper nourishment and fluids encouraged, as appropriate. Redness noted to pt buttocks . Will continue to monitor for additional needs and changes in skin breakdown.
breakdown  Outcome: Ongoing     Outcome: Met This Shift  Note: The patient's skin remains dry and intact. Assist patient with turning Q2 hours and PRN.

## 2021-02-26 NOTE — DISCHARGE SUMMARY
[facility].,    Adult Hospitalist      Patient ID: Clarita Kurtz  MRN: [id number]     Acct:  [de-identified]       Patient's PCP: Avni Valdez MD    Admit Date: 2/22/2021     Discharge Date:   2/26/2021    Admitting Physician: Ericka Dangelo MD    Discharge Physician: Oren Gomez MD     CONSULTANTS: Patient Care Team:  Avni Valdez MD as PCP - General      Active Discharge Diagnoses:  NSTEMI  Acute systolic congestive heart failure, ejection fraction 40 to 45%  Cardiogenic shock  Multifocal pneumonia  Acute cystitis without hematuria  Essential hypertension  Depression with anxiety  Memory impairment  Esophageal reflux disease   Osteoporosis        Hospital Course:  Clarita Kurtz is a 80 y.o.  female who presents with Back Pain     Patient brought to the ED by EMS after she had called 911 for c/o pain in left hip. Pt says she could not get out of bed because of that pain. She states the pain had been ongoing since the day before when she sat in a chair. Pt says she did have a loose BM and had vomited yesterday once. Pt says she has been feeling very tired and got exhausted after minimal activity. Pt says sh e thought it was her hip but when EMTs came over, they told her it was her heart.     Per EMS a 12 lead EKG was done which read 'STEMI' in anterior leads but there were no reciprocal changes. The pt had denied any chest pain, dyspnea, cough, wheezing, headache, vision change, nausea, vomiting, abdominal pain, joint swelling or rash.     Cardiology reviewed EKG in the ER and determined the pt did not have STEMI. The pt was started on Heparin gtt and plan would be to get a Cardiac cath done tomorrow.     Patient is admitted with NSTEMI and acute systolic congestive heart failure, also noticed to have cardiogenic shock initially and As patient noticed to have multifocal pneumonia along with UTI, patient urine and blood cultures were drawn, urine culture was not initiated on for this reason patient was continued on IV Rocephin and oral doxycycline, patient tolerated that well, patient is discharged on 5 more days of oral Omnicef as an outpatient  Patient also noticed to have NSTEMI/heart failure, patient had a cardiac catheterization on 2/20/2021 suspected to be cardiomyopathy secondary to suspected Takotsubo's, ejection fraction 40 to 45%, cardiology recommended to continue aspirin atorvastatin, they will add beta-blocker as an outpatient as patient is having some hypotension and they will follow the patient closely as an outpatient, overall now that she patient is feeling better completed her therapy patient is discharged from the hospital in stable condition to skilled skilled facility for further rehabilitation    The plan was discussed in detail with patient who agreed with the plan and verbalized understanding . The patient was seen and examined on day of discharge and this discharge summary is in conjunction with any daily progress note from day of discharge. Hospital Data:    Labs:    Hematology:  Recent Labs     02/24/21  0550 02/25/21  0500   WBC 4.8 5.0   RBC 3.18* 3.31*   HGB 9.6* 10.1*   HCT 31.4* 32.2*   MCV 98.7 97.3   MCH 30.2 30.5   MCHC 30.6 31.4   RDW 14.0 13.8   * 118*   MPV 10.1 10.5     Chemistry:  Recent Labs     02/24/21  0550 02/25/21  0500    139   K 4.2 4.1    102   CO2 29 31   GLUCOSE 89 92   BUN 16 19   CREATININE 0.66 0.60   ANIONGAP 6* 6*   LABGLOM >60 >60   GFRAA >60 >60   CALCIUM 7.7* 8.1*     No results for input(s): PROT, LABALBU, LABA1C, P8IKRZD, L0EWMYQ, FT4, TSH, AST, ALT, LDH, GGT, ALKPHOS, LABGGT, BILITOT, BILIDIR, AMMONIA, AMYLASE, LIPASE, LACTATE, CHOL, HDL, LDLCHOLESTEROL, CHOLHDLRATIO, TRIG, VLDL, [id number], PHENYTOIN, PHENYF, URICACID, POCGLU in the last 72 hours.   Lab Results   Component Value Date    INR 1.2 02/23/2021    INR 1.0 09/06/2020    INR 1.0 10/25/2015    PROTIME 14.8 (H) 02/23/2021    PROTIME 10.5 09/06/2020    PROTIME 10.0 10/25/2015     Lab Results   Component Value Date/Time    SPECIAL [facility] 02/22/2021 04:50 PM    SPECIAL LINE 02/22/2021 04:50 PM     Lab Results   Component Value Date/Time    CULTURE NO GROWTH 4 DAYS 02/22/2021 04:50 PM    CULTURE NO GROWTH 4 DAYS 02/22/2021 04:50 PM       No results found for: POCPH, PHART, PH, POCPCO2, TRO8JUG, PCO2, POCPO2, PO2ART, PO2, POCHCO3, ZPM7VYE, HCO3, NBEA, PBEA, BEART, BE, THGBART, THB, VXD3QCE, MQMK0PGI, A8WULXBA, O2SAT, FIO2    Radiology:    Ir Luisa Baca Device Plmt/replace/removal    Result Date: 2/22/2021  Successful ultrasound and fluoroscopy guided non-tunneled triple-lumen catheter placement. Xr Chest Portable    Result Date: 2/25/2021  Right infrahilar ill-defined parenchymal opacity is unchanged. New left retrocardiac consolidation could reflect pneumonia versus atelectasis. Cta Chest Abdomen Pelvis W Contrast    Result Date: 2/22/2021  1. No acute aortic abnormality, aneurysm or dissection. 2.  No evidence for acute pulmonary embolism. 3.  Interstitial infiltrate in the right middle lobe and right lower lobe. Small pleural effusions and dependent atelectasis are also present. This may represent multifocal inflammatory process or infection. 4.  No acute abnormality identified in the abdomen or pelvis. 5.  Status post cholecystectomy. Biliary enlargement is noted, which may represent post cholecystectomy reservoir effect. If biliary laboratory evaluation is abnormal, further evaluation with MRCP or ERCP may be warranted. 6.  No acute osseous abnormality identified in the pelvis or left hip. 7.  Moderate size hiatal hernia. Us Retroperitoneal Complete    Result Date: 2/23/2021  Left kidney is smaller than the right. Otherwise unremarkable exam.     Xr Hip 2-3 Vw W Pelvis Left    Result Date: 2/22/2021  No evidence of acute bony abnormality involving the pelvis or hips, with attention to the left hip.          All radiological studies reviewed      Reviews of Symptoms:    A 10 point system is reviewed and  negative except described in hospital course    Physical Exam:    Vitals:  BP (!) 140/71   Pulse 60   Temp 97.8 °F (36.6 °C) (Oral)   Resp 18   Ht 5' 9\" (1.753 m)   Wt 148 lb 1 oz (67.2 kg)   SpO2 95%   BMI 21.87 kg/m²   Temp (24hrs), Av.7 °F (36.5 °C), Min:97.2 °F (36.2 °C), Max:98.1 °F (36.7 °C)      General appearance - alert, well appearing, and in no acute distress  Mental status - oriented to person, place, and time with normal affect  Head - normocephalic and atraumatic  Eyes - pupils equal and reactive, extraocular eye movements intact, conjunctiva clear  Ears - hearing appears to be intact  Nose - no drainage noted  Mouth - mucous membranes moist  Neck - supple, no carotid bruits, thyroid not palpable  Chest - clear to auscultation, normal effort  Heart - normal rate, regular rhythm, no murmur  Abdomen - soft, nontender, nondistended, bowel sounds present all four quadrants, no masses, hepatomegaly or splenomegaly  Neurological - normal speech, no focal findings or movement disorder noted, cranial nerves II through XII grossly intact  Extremities - peripheral pulses palpable, no pedal edema or calf pain with palpation  Skin - no gross lesions, rashes, or induration noted      Consults:  IP CONSULT TO HOSPITALIST  IP CONSULT TO CARDIOLOGY    Disposition:  Skilled nursing care facility    Discharged Condition: Stable    Follow Up:  Maribel Rivas MD  [facility] [address] [facility]  [phone]    Call in 2 weeks  follow up      Lab Frequency Next Occurrence   Up with assistance PRN    Up with assistance PRN    Intake and output EVERY 8 HOURS    Initiate Oxygen Therapy Protocol DAILY (RT)    Pulse Oximetry Spot Check PRN    Basic Metabolic Panel w/ Reflex to MG DAILY    CBC DAILY    Nasal Cannula Oxygen DAILY (RT)    Initiate Oxygen Therapy Protocol DAILY (RT)          Diet: DIET CARDIAC; Discharge Medications:    George Rodríguez   Home Medication Instructions DQS:[id number]    Printed on:02/26/21 3935   Medication Information                      ARIPiprazole (ABILIFY) 5 MG tablet  Take 5 mg by mouth daily             aspirin 81 MG chewable tablet  Take 1 tablet by mouth daily             atorvastatin (LIPITOR) 40 MG tablet  Take 1 tablet by mouth nightly             cefdinir (OMNICEF) 300 MG capsule  Take 1 capsule by mouth 2 times daily for 5 days             Cholecalciferol (VITAMIN D-3 PO)    Take 2,000 Units by mouth daily              citalopram (CELEXA) 20 MG tablet  Take 20 mg by mouth daily             cyanocobalamin 1000 MCG/ML injection  Inject 1,000 mcg into the muscle every 30 days             donepezil (ARICEPT) 5 MG tablet  Take 5 mg by mouth nightly              magnesium hydroxide (MILK OF MAGNESIA) 400 MG/5ML suspension  Take 15 mLs by mouth every other day             Multiple Vitamins-Minerals (CENTRUM SILVER ULTRA WOMENS) TABS  Take 1 tablet by mouth daily             omeprazole (PRILOSEC) 10 MG delayed release capsule  Take 20 mg by mouth every morning (before breakfast)              traZODone (DESYREL) 100 MG tablet  Take 100 mg by mouth nightly                 Code Status:  Full Code    Time Spent on discharge is  35 mins in patient examination, evaluation, counseling as well as medication reconciliation, prescriptions for required medications, discharge plan and follow up. Electronically signed by Ronda Bella MD on 2/26/2021 at 9:18 AM     Thank you Dr. Robby Mejía MD for the opportunity to be involved in this patient's care. This note was created with the assistance of a speech-recognition program.  Although the intention is to generate a document that actually reflects the content of the visit, no guarantees can be provided that every mistake has been identified and corrected by editing.      Note was updated later by me after  physical examination and  completion of the assessment.

## 2021-02-26 NOTE — CARE COORDINATION
Social work; faxed teresita and Rapid covide negative results to [facility]. [place]. No hens needed.   Dot yeew

## 2021-02-26 NOTE — PROGRESS NOTES
Orthoses?: No  Position Activity Restriction  Other position/activity restrictions: telemetry, LUE IV, dementia, RUE IV, CVC triple lumen R Jugular  Subjective   General  Chart Reviewed: Yes  Patient assessed for rehabilitation services?: Yes  Response to previous treatment: Patient with no complaints from previous session  Family / Caregiver Present: No  Vital Signs  Patient Currently in Pain: Denies   Orientation  Orientation  Overall Orientation Status: Within Normal Limits  Objective    ADL  Grooming: Setup; Increased time to complete;Verbal cueing(seated EOB to complete oral care, wash face, brush hair)  UE Bathing: Setup;Minimal assistance;Verbal cueing; Increased time to complete(seated EOB to wash back)  LE Bathing: Setup; Moderate assistance;Maximum assistance;Verbal cueing; Increased time to complete(standing at EOB)  UE Dressing: Setup;Minimal assistance(to change gowns)  LE Dressing: Setup;Maximum assistance  Additional Comments: Pt in bed upon arrival with urine soaked brief and paper leobardo. Pt seemed unaware she was wet, when writer asked pt if she needed to use the restroom pt stated she did not but did not mention she was wet. Pt sat EOB to complete bathing/dressing/grooming. Balance  Sitting Balance: Independent  Standing Balance: Stand by assistance  Bed mobility  Supine to Sit: Stand by assistance  Scooting: Stand by assistance  Comment: verbal cues for technique and use of bed rail  Transfers  Stand Step Transfers: Stand by assistance  Sit to stand: Stand by assistance  Stand to sit: Stand by assistance  Transfer Comments: Verbal cues for hand placement and overall safety                       Cognition  Overall Cognitive Status: Exceptions  Arousal/Alertness: Appropriate responses to stimuli  Following Commands:  Follows multistep commands consistently  Attention Span: Appears intact  Memory: Decreased recall of recent events;Decreased short term memory  Safety Judgement: Decreased awareness of need for safety  Problem Solving: Assistance required to identify errors made;Assistance required to correct errors made  Insights: Decreased awareness of deficits  Initiation: Requires cues for some  Sequencing: Requires cues for some  Cognition Comment: Pt pleasant and cooperative but stated she was tired due to lack of sleep during the night. Perception  Overall Perceptual Status: [facility]                                   Plan   Plan  Times per week: 4-5x/wk 1-2x/day as brenda  Current Treatment Recommendations: Strengthening, Functional Mobility Training, Balance Training, Safety Education & Training, Endurance Training, Patient/Caregiver Education & Training, Equipment Evaluation, Education, & procurement, Self-Care / ADL, Home Management Training             Goals  Short term goals  Time Frame for Short term goals: By discharge, pt to demo  Short term goal 1: bed mobility to Mod I with use of bedrail as needed. Short term goal 2: ADL tranfers and functional mobility to Mod I with use of AD as needed. Short term goal 3: UB ADLs to Set up and LB ADLs to Min A with use of AD/AE as needed. Short term goal 4: increased BUE strength by 1/2 grade to assist with selfcare as needed/I with BUE HEP with use of handouts as needed. Short term goal 5: toileting to Mod I with use of AD/Grab bar as needed. Long term goals  Long term goal 1: Pt/caregiver to demo I with fall prevention/EC/WS tech, recommendations for AE with use of handouts as needed. Patient Goals   Patient goals : To feel better and go home!        Therapy Time   Individual Concurrent Group Co-treatment   Time In 0743         Time Out 0813         Minutes HUMBERTO Zuniga

## 2021-02-26 NOTE — CARE COORDINATION
Social work: called floor to advise of need for new rapid covid test asap and teresita along with Rx at discharge No hens needed for this snf. [facility]. Report [phone]  Fax: [phone].  Kate metz

## 2021-02-26 NOTE — FLOWSHEET NOTE
02/22/21 1546   Provider Notification   Reason for Communication Review case   Provider Name DR. ZAZUETA   Provider Notification Physician   Method of Communication Page   Response Waiting for response     DR. ZAZUETA PAGED AT THIS TIME. AWAITING CALL BACK TO NOTIFY OF NEW CONSULT.
02/22/21 1550   Provider Notification   Reason for Communication Review case;New orders   Provider Name Dr. Paola Fields   Provider Notification Physician   Method of Communication Call   Response See orders   Dr. Paola Fields states she was already notified of consult. NO plans to cath patient today. 2 more cardiac enzyme checks - 8 hrs apart. OK to eat at this time - NPO after midnight. Will reassess in the AM - patient will likely be cath'd tomorrow morning. Patient aware and verbalizes understanding.
02/22/21 1915   Provider Notification   Reason for Communication Evaluate; Review case   Provider Name Dr. aMry Diaz   Provider Notification Physician   Method of Communication Face to face   Response At bedside   Dr. Mary Diaz at bedside. Update provided. Aware patient is very sleepy. And c/o pain with palpation over kidneys on her back. MD ordered Renal U/S due to history of renal cysts. Patient also has UTI and is now on Rocephin.    MD also aware that trops were trending up and Dr. Rach Gaviria plans to watch overnight and will likely cath her in the AM.
02/25/21 1645   Provider Notification   Reason for Communication New orders   Provider Name Dr. Santo Miller   Provider Notification Physician   Method of Communication Page   Response Waiting for response   Notification Time [phone]     Dr. Santo Miller returned page, okay at this time to discontinue the central line in the right internal jugular. Dr. Santo Miller updated that the patient and her daughter are now agreeable to [facility] for SNF and [facility] Leader (social work) would like to set up the patient for transport tomorrow. Per Dr. Santo Miller okay to discharge tomorrow.
Pt dressed per writer . informed of transfer to [place]. Pt aware already and states she is ready to go. All belongings packed and suitcase also ready. Awaiting ambulette  for transportation.      11;15 transportation here- pt to [facility]
Pt had cardiac cath and nurse reports it was \"negative. \"  Pt is very sleepy most of the time. She was sitting up and eating when writer was on unit. No family present. Chaplains will remain available to offer spiritual and emotional support as needed.        02/23/21 1644   Encounter Summary   Services provided to: Patient not available   Referral/Consult From: [address] [place] [address] Road Visiting   (2/23/21)   Complexity of Encounter Low   Routine   Type Follow up   Assessment Approachable;Calm   Intervention Sustaining presence/ Ministry of presence   Outcome Did not respond     Electronically signed by Gurjit Hopkins on 2/23/2021 at 4:45 PM.  [facility]  [phone]
Report called to Fátima Alvarez RN at [facility]. All questions answered. Informed of 11;00 am  time.
Request Of pt and medical team   Outcome Did not respond       Electronically signed by Porsha Muro, on 2/22/2021 at 9:23 AM.  [facility]  [phone] and Jean-Pierre Henderson

## 2022-10-18 NOTE — PROGRESS NOTES
Covid 19 swab taken from right nare, labeled, placed in red dot bag, and handed off to second healthcare worker outside of room for transport to laboratory per hospital policy and procedure. Patient tolerated procedure well. Patient

## 2024-06-12 NOTE — CARE COORDINATION
TRANSITIONAL CARE PLANNING/ 2 [facility] Day: 4    Reason for Admission: COVID-19 [U07.1]     Treatment Plan of Care: full code, repeat covid pending    :     Readmission Risk           Risk of Unplanned Readmission:      19            Patient goals/Treatment Preferences/Transitional Plan: dc order in. Called travis with [facility] intake reviewing    11:54 spoke to daughter Ac Andujar said she received call from  [facility] that her mom was accepted. Called intake spoke to april will check and return call  12:33 spoke to autumn reviewed chart and because bp was 166/ 86 @ 0039 & elevated d dime from 3 days ago cannot accept today call was disconnected called back and spoke to travis who said their having internet trouble  Call  was disconnected again . Ps dr Diamond Cyr to ask to repeat d-dimer  14:27 response repeat d-dimer is not needed will address bp   16:43 called travis [facility] intake discussed bp this morning of 126/58 said they have guidelines to follow. Called abiola dooley to see if a referral can be made to another facility declined     Patient/family seen: No: +covid       Informed patient/family of [facility] Medical Bundle Program with potential outreach by either Care Transitions Team or [facility] Team based on hospital admission and location.        [facility] Notification Letter mailed: Yes         Current discharge plan: referral REUBEN summers 12-Jun-2024 23:16